# Patient Record
Sex: FEMALE | Race: WHITE | NOT HISPANIC OR LATINO | Employment: UNEMPLOYED | URBAN - METROPOLITAN AREA
[De-identification: names, ages, dates, MRNs, and addresses within clinical notes are randomized per-mention and may not be internally consistent; named-entity substitution may affect disease eponyms.]

---
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
## 2019-01-18 ENCOUNTER — HOSPITAL ENCOUNTER (EMERGENCY)
Facility: CLINIC | Age: 19
Discharge: HOME | End: 2019-01-18
Attending: EMERGENCY MEDICINE | Admitting: EMERGENCY MEDICINE
Payer: MEDICAID

## 2019-01-18 VITALS
BODY MASS INDEX: 49.05 KG/M2 | OXYGEN SATURATION: 96 % | RESPIRATION RATE: 16 BRPM | HEART RATE: 98 BPM | TEMPERATURE: 98.2 F | DIASTOLIC BLOOD PRESSURE: 72 MMHG | SYSTOLIC BLOOD PRESSURE: 121 MMHG | WEIGHT: 293 LBS

## 2019-01-18 DIAGNOSIS — R44.3 HALLUCINATIONS: ICD-10-CM

## 2019-01-18 PROCEDURE — 99285 EMERGENCY DEPT VISIT HI MDM: CPT | Mod: Z6 | Performed by: EMERGENCY MEDICINE

## 2019-01-18 PROCEDURE — 25000132 ZZH RX MED GY IP 250 OP 250 PS 637: Performed by: EMERGENCY MEDICINE

## 2019-01-18 PROCEDURE — 90791 PSYCH DIAGNOSTIC EVALUATION: CPT

## 2019-01-18 PROCEDURE — 99285 EMERGENCY DEPT VISIT HI MDM: CPT | Mod: 25 | Performed by: EMERGENCY MEDICINE

## 2019-01-18 RX ORDER — HYDROXYZINE HYDROCHLORIDE 50 MG/1
50 TABLET, FILM COATED ORAL 3 TIMES DAILY PRN
Status: DISCONTINUED | OUTPATIENT
Start: 2019-01-18 | End: 2019-01-18 | Stop reason: HOSPADM

## 2019-01-18 RX ORDER — CLONAZEPAM 0.5 MG/1
0.5 TABLET ORAL 3 TIMES DAILY PRN
COMMUNITY
End: 2022-04-30

## 2019-01-18 RX ORDER — LAMOTRIGINE 200 MG/1
200 TABLET ORAL 2 TIMES DAILY
COMMUNITY

## 2019-01-18 RX ORDER — LEVOTHYROXINE SODIUM 25 UG/1
25 TABLET ORAL AT BEDTIME
COMMUNITY
End: 2022-07-23

## 2019-01-18 RX ORDER — IBUPROFEN 800 MG/1
800 TABLET, FILM COATED ORAL EVERY 8 HOURS PRN
Status: ON HOLD | COMMUNITY
End: 2022-08-21

## 2019-01-18 RX ORDER — CLONIDINE HYDROCHLORIDE 0.1 MG/1
0.1 TABLET, EXTENDED RELEASE ORAL ONCE
Status: COMPLETED | OUTPATIENT
Start: 2019-01-18 | End: 2019-01-18

## 2019-01-18 RX ORDER — IBUPROFEN 800 MG/1
800 TABLET, FILM COATED ORAL EVERY 8 HOURS PRN
Status: DISCONTINUED | OUTPATIENT
Start: 2019-01-18 | End: 2019-01-18 | Stop reason: HOSPADM

## 2019-01-18 RX ORDER — GABAPENTIN 300 MG/1
300 CAPSULE ORAL DAILY
Status: DISCONTINUED | OUTPATIENT
Start: 2019-01-18 | End: 2019-01-18 | Stop reason: HOSPADM

## 2019-01-18 RX ORDER — LAMOTRIGINE 200 MG/1
200 TABLET ORAL 2 TIMES DAILY
Status: DISCONTINUED | OUTPATIENT
Start: 2019-01-18 | End: 2019-01-18 | Stop reason: HOSPADM

## 2019-01-18 RX ORDER — CLONAZEPAM 0.5 MG/1
0.5 TABLET ORAL 3 TIMES DAILY PRN
Status: DISCONTINUED | OUTPATIENT
Start: 2019-01-18 | End: 2019-01-18 | Stop reason: HOSPADM

## 2019-01-18 RX ORDER — IBUPROFEN 800 MG/1
800 TABLET, FILM COATED ORAL ONCE
Status: COMPLETED | OUTPATIENT
Start: 2019-01-18 | End: 2019-01-18

## 2019-01-18 RX ORDER — CLONIDINE HYDROCHLORIDE 0.1 MG/1
0.1 TABLET, EXTENDED RELEASE ORAL AT BEDTIME
COMMUNITY
End: 2022-04-30

## 2019-01-18 RX ORDER — LURASIDONE HYDROCHLORIDE 80 MG/1
80 TABLET, FILM COATED ORAL DAILY
Status: DISCONTINUED | OUTPATIENT
Start: 2019-01-18 | End: 2019-01-18 | Stop reason: HOSPADM

## 2019-01-18 RX ORDER — LURASIDONE HYDROCHLORIDE 80 MG/1
80 TABLET, FILM COATED ORAL AT BEDTIME
COMMUNITY
End: 2022-04-30

## 2019-01-18 RX ORDER — SENNOSIDES A AND B 8.6 MG/1
1 TABLET, FILM COATED ORAL 2 TIMES DAILY
COMMUNITY

## 2019-01-18 RX ORDER — LORAZEPAM 1 MG/1
1 TABLET ORAL ONCE
Status: COMPLETED | OUTPATIENT
Start: 2019-01-18 | End: 2019-01-18

## 2019-01-18 RX ORDER — LEVOTHYROXINE SODIUM 25 UG/1
25 TABLET ORAL ONCE
Status: COMPLETED | OUTPATIENT
Start: 2019-01-18 | End: 2019-01-18

## 2019-01-18 RX ORDER — DOCUSATE SODIUM 100 MG/1
100 CAPSULE, LIQUID FILLED ORAL 2 TIMES DAILY
COMMUNITY
End: 2022-07-23

## 2019-01-18 RX ORDER — CLONIDINE HYDROCHLORIDE 0.1 MG/1
0.1 TABLET ORAL 2 TIMES DAILY
COMMUNITY
End: 2022-04-30

## 2019-01-18 RX ORDER — HYDROXYZINE PAMOATE 50 MG/1
50 CAPSULE ORAL EVERY 8 HOURS PRN
COMMUNITY

## 2019-01-18 RX ORDER — GABAPENTIN 600 MG/1
1200 TABLET ORAL AT BEDTIME
COMMUNITY

## 2019-01-18 RX ADMIN — CLONAZEPAM 0.5 MG: 0.5 TABLET ORAL at 14:24

## 2019-01-18 RX ADMIN — IBUPROFEN 800 MG: 800 TABLET, FILM COATED ORAL at 14:50

## 2019-01-18 RX ADMIN — HYDROXYZINE HYDROCHLORIDE 50 MG: 50 TABLET ORAL at 09:23

## 2019-01-18 RX ADMIN — LAMOTRIGINE 200 MG: 200 TABLET ORAL at 10:48

## 2019-01-18 RX ADMIN — CLONAZEPAM 0.5 MG: 0.5 TABLET ORAL at 09:23

## 2019-01-18 RX ADMIN — GABAPENTIN 300 MG: 300 CAPSULE ORAL at 10:46

## 2019-01-18 RX ADMIN — CLONIDINE HYDROCHLORIDE 0.1 MG: 0.1 TABLET, EXTENDED RELEASE ORAL at 10:50

## 2019-01-18 RX ADMIN — LORAZEPAM 1 MG: 1 TABLET ORAL at 02:22

## 2019-01-18 RX ADMIN — LEVOTHYROXINE SODIUM 25 MCG: 25 TABLET ORAL at 10:49

## 2019-01-18 RX ADMIN — RANITIDINE 150 MG: 150 TABLET, FILM COATED ORAL at 10:46

## 2019-01-18 RX ADMIN — HYDROXYZINE HYDROCHLORIDE 50 MG: 50 TABLET ORAL at 14:24

## 2019-01-18 RX ADMIN — IBUPROFEN 800 MG: 800 TABLET, FILM COATED ORAL at 09:23

## 2019-01-18 ASSESSMENT — ENCOUNTER SYMPTOMS
HALLUCINATIONS: 1
NECK PAIN: 0
DYSURIA: 0
FEVER: 0
SHORTNESS OF BREATH: 0
CHEST TIGHTNESS: 0
ABDOMINAL PAIN: 0
HEADACHES: 0
SORE THROAT: 0
BACK PAIN: 0
CHILLS: 0

## 2019-01-18 NOTE — ED PROVIDER NOTES
"  History     Chief Complaint   Patient presents with     Mental Health Problem     Pt reports feeling \"numb and unstable, not all there\" for a couple weeks. Has baseline AH & VH, states have recently been more intense. Mom reports that she has been more edgy lately and has been violent with objects. Pt states she is afraid of losing her temper and hurting herself or others.     Suicidal     thoughts come & go, no specific plan shared during triage.     HPI  Yasmine Smith is a 18 year old female with a history of autism, bipolar, anxiety, who presents to the emergency department for evaluation of mental health problem.  Patient reports that she has been experiencing auditory and visual hallucinations along with paranoia.  She states that she gets these rages to hurt others and that these voices that she is hearing are telling her to hurt others.  She also has been experiencing nightmares.  Patient sees a therapist in the medical provider and reports that she is compliant with her medications.  She is not on any antipsychotics.  She has used Risperdal in the past however had significant weight gain (over 100 pounds in 1 year) and so was discontinued from this medication.  Patient states that she wants to be here to seek help.  Patient is with her mother.    I have reviewed the Medications, Allergies, Past Medical and Surgical History, and Social History in the [facility] system.  Past Medical History:   Diagnosis Date     Anxiety disorder      Bipolar disorder (H)        Past Surgical History:   Procedure Laterality Date     ENT SURGERY      tonsils, adenoids, turbulance       History reviewed. No pertinent family history.    Social History     Tobacco Use     Smoking status: Current Every Day Smoker     Packs/day: 0.50     Smokeless tobacco: Never Used     Tobacco comment: parents smoke outside   Substance Use Topics     Alcohol use: No       No current facility-administered medications for this encounter.  "     Current Outpatient Medications   Medication     CLONIDINE HCL ER PO     CLONIDINE HCL PO     docusate sodium (COLACE) 100 MG capsule     GABAPENTIN PO     lamoTRIgine (LAMICTAL) 200 MG tablet     LEVOTHYROXINE SODIUM PO     lurasidone (LATUDA) 80 MG TABS tablet     ranitidine (RANITIDINE 150 MAX STRENGTH) 150 MG tablet     senna (SENOKOT) 8.6 MG tablet     clonazePAM (KLONOPIN) 0.5 MG tablet     HydrOXYzine Pamoate (VISTARIL PO)     ibuprofen (ADVIL/MOTRIN) 800 MG tablet        Allergies   Allergen Reactions     Banana Swelling     Throat swells/ puffy     Food Swelling     Cantalope, causes swelling/puffiness of throat         Review of Systems   Constitutional: Negative for chills and fever.   HENT: Negative for sore throat.    Respiratory: Negative for chest tightness and shortness of breath.    Cardiovascular: Negative for chest pain.   Gastrointestinal: Negative for abdominal pain.   Genitourinary: Negative for dysuria.   Musculoskeletal: Negative for back pain and neck pain.   Neurological: Negative for headaches.   Psychiatric/Behavioral: Positive for hallucinations.   All other systems reviewed and are negative.      Physical Exam   BP: 123/82  Heart Rate: 92  Temp: 98.2  F (36.8  C)  Resp: 16  Weight: 142.1 kg (313 lb 3.2 oz)  SpO2: 97 %      Physical Exam  Physical Exam   Constitutional: oriented to person, place, and time. appears well-developed and well-nourished.   HENT:   Head: Normocephalic and atraumatic.   Neck: Normal range of motion.   Pulmonary/Chest: Effort normal. No respiratory distress.   Cardiac: No murmurs, rubs, gallops. RRR.  Abdominal: Abdomen soft, nontender, nondistended. No rebound tenderness.  MSK: Long bones without deformity or evidence of trauma  Neurological: alert and oriented to person, place, and time.   Skin: Skin is warm and dry.   Psychiatric:  normal mood and affect.  behavior is normal. Thought content normal.     ED Course   1:57 AM  The patient was seen and examined  by Thanh Herrera MD in Room 9.        Procedures    Labs Ordered and Resulted from Time of ED Arrival Up to the Time of Departure from the ED - No data to display         Assessments & Plan (with Medical Decision Making)   MDM  Patient presenting with decompensation of psychiatric disease.  She is having increasing hallucinations in addition to violent and suicidal thoughts.  Patient does not feel safe at home, neither does mother who is the patient's decision maker.  Patient is voluntary at this point for admission.  I do feel this patient would benefit from inpatient stabilization.  Family agrees with this plan.    I have reviewed the nursing notes.    I have reviewed the findings, diagnosis, plan and need for follow up with the patient.       Medication List      There are no discharge medications for this visit.         Final diagnoses:   Hallucinations     IThanh, am serving as a trained medical scribe to document services personally performed by Thanh Herrera MD, based on the provider's statements to me.   Thanh HONG MD, was physically present and have reviewed and verified the accuracy of this note documented by Thanh Bryan.    1/18/2019   [facility], [place], EMERGENCY DEPARTMENT     Thanh Herrera MD  01/18/19 0202

## 2019-01-18 NOTE — ED NOTES
Pt refusing to take her meds until her mother comes back from picking up breakfast.  Pt updated that mother said on phone she would be back in 10 minutes.  Pt easily upset.  Baling but not physically or verbally aggressive.

## 2019-01-18 NOTE — ED NOTES
2:38 PM  Patient was signed out to me as awaiting admission secondary to ongoing hallucinations in the setting of autism, anxiety and bipolar.  Patient has been here for 14 hours and mother has arrived.  In addition, patient is reassessed by behavioral emergency room .  Patient is much improved on current medications and mother wishes to bring her home.  She appears to be communicating without difficulty and does not appear to be holdable.  She will be discharged to follow-up with her current services in place.     Walter Way MD  01/18/19 7426

## 2019-01-18 NOTE — ED NOTES
"ED to Behavioral Floor Handoff    SITUATION  Yasmine Smith is a 18 year old female who speaks English and lives in a home with family members The patient arrived in the ED by private car from home with a complaint of Mental Health Problem (Pt reports feeling \"numb and unstable, not all there\" for a couple weeks. Has baseline AH & VH, states have recently been more intense. Mom reports that she has been more edgy lately and has been violent with objects. Pt states she is afraid of losing her temper and hurting herself or others.) and Suicidal (thoughts come & go, no specific plan shared during triage.)  .The patient's current symptoms started/worsened 1 week(s) ago and during this time the symptoms have remained the same.   In the ED, pt was diagnosed with   Final diagnoses:   Hallucinations        Initial vitals were: BP: 123/82  Heart Rate: 92  Temp: 98.2  F (36.8  C)  Resp: 16  Weight: 142.1 kg (313 lb 3.2 oz)  SpO2: 97 %   --------  Is the patient diabetic? No   If yes, last blood glucose? --     If yes, was this treated in the ED? --  --------  Is the patient inebriated (ETOH) No or Impaired on other substances? No  MSSA done? N/A  Last MSSA score: --    Were withdrawal symptoms treated? N/A  Does the patient have a seizure history? No. If yes, date of most recent seizure--  --------  Is the patient patient experiencing suicidal ideation? denies current or recent suicidal ideation     Homicidal ideation? Patient reporting that the voices in her head have told her to hurt people, no specific plan    Self-injurious behavior/urges? denies current or recent self injurious behavior or ideation.  ------  Was pt aggressive in the ED No  Was a code called No  Is the pt now cooperative? Yes  -------  Meds given in ED:   Medications   LORazepam (ATIVAN) tablet 1 mg (1 mg Oral Given 1/18/19 0222)      Family present during ED course? Yes  Family currently present? Yes    BACKGROUND  Does the patient have a cognitive " impairment or developmental disability? Yes  Allergies:   Allergies   Allergen Reactions     Banana Swelling     Throat swells/ puffy     Food Swelling     Cantalope, causes swelling/puffiness of throat     .   Social demographics are   Social History     Socioeconomic History     Marital status: Single     Spouse name: None     Number of children: None     Years of education: None     Highest education level: None   Social Needs     Financial resource strain: None     Food insecurity - worry: None     Food insecurity - inability: None     Transportation needs - medical: None     Transportation needs - non-medical: None   Occupational History     None   Tobacco Use     Smoking status: Current Every Day Smoker     Packs/day: 0.50     Smokeless tobacco: Never Used     Tobacco comment: parents smoke outside   Substance and Sexual Activity     Alcohol use: No     Drug use: No     Sexual activity: None   Other Topics Concern     None   Social History Narrative     None        ASSESSMENT  Labs results Labs Ordered and Resulted from Time of ED Arrival Up to the Time of Departure from the ED - No data to display   Imaging Studies: No results found for this or any previous visit (from the past 24 hour(s)).   Most recent vital signs /82   Temp 98.2  F (36.8  C) (Oral)   Resp 16   Wt 142.1 kg (313 lb 3.2 oz)   SpO2 97%   BMI 49.05 kg/m     Abnormal labs/tests/findings requiring intervention:---   Pain control: pt had none  Nausea control: pt had none    RECOMMENDATION  Are any infection precautions needed (MRSA, VRE, etc.)? No If yes, what infection? --  ---  Does the patient have mobility issues? independently. If yes, what device does the pt use? ---  ---  Is patient on 72 hour hold or commitment? No If on 72 hour hold, have hold and rights been given to patient? N/A  Are admitting orders written if after 10 p.m. ?N/A  Tasks needing to be completed:---     Wilma Hoffman   [phone] [facility]

## 2019-01-18 NOTE — PHARMACY-ADMISSION MEDICATION HISTORY
Admission medication history interview status for the 1/18/2019 admission is complete. See Epic admission navigator for allergy information, pharmacy and prior to admission medications.     Medication history interview sources:  Patient's mother (medication list on her phone), [facility]    Changes made to PTA medication list (reason)  Added: none  Deleted: none  Changed: clonidine (frequency per patient's mother), gabapentin (dose and frequency per patient's mother), hydroxyzine (frequcney per patient's mother), levothyroxine (frequency per patient's mother), Latuda (dosing and frequency per patient's mother), senna (frequency per patient's mother)    Additional medication history information (including reliability of information, actions taken by pharmacist): The patient's mother was a reliable historian of her medication history and able to discuss the patient's medications without difficulty.     Clonazepam 0.5 mg last filled 11/8/18 for quantity 60 for 30 day supply  Gabapentin 300 mg last filled 12/20/18 for quantity 90 for 30 day supply  Gabapentin 600 mg last filled 11/28/18 for quantity 30 for 30 day supply      Prior to Admission medications    Medication Sig Last Dose Taking? Auth Provider   clonazePAM (KLONOPIN) 0.5 MG tablet Take 0.5 mg by mouth 3 times daily as needed for anxiety 1/18/2019 at AM Yes Reported, Patient   cloNIDine (CATAPRES) 0.1 MG tablet Take 0.1 mg by mouth 2 times daily 1/18/2019 at AM Yes Unknown, Entered By History   CloNIDine ER (KAPVAY) 0.1 MG 12 hr tablet Take 0.1 mg by mouth At Bedtime 1/17/2019 at Unknown time Yes Unknown, Entered By History   docusate sodium (COLACE) 100 MG capsule Take 100 mg by mouth 2 times daily 1/18/2019 at AM Yes Reported, Patient   gabapentin (NEURONTIN) 300 MG capsule Take 900 mg by mouth At Bedtime 1/17/2019 at Unknown time Yes Unknown, Entered By History   GABAPENTIN PO Take 300 mg by mouth daily  1/18/2019 at AM Yes  Reported, Patient   hydrOXYzine (VISTARIL) 50 MG capsule Take 50 mg by mouth 3 times daily (with meals) Takes at 0800, 1200 and 1600 1/18/2019 at Unknown time Yes Unknown, Entered By History   ibuprofen (ADVIL/MOTRIN) 800 MG tablet Take 800 mg by mouth every 8 hours as needed for moderate pain 1/18/2019 at Unknown time Yes Reported, Patient   lamoTRIgine (LAMICTAL) 200 MG tablet Take 200 mg by mouth 2 times daily 1/18/2019 at AM Yes Reported, Patient   levothyroxine (SYNTHROID/LEVOTHROID) 25 MCG tablet Take 25 mcg by mouth every morning 1/18/2019 at Unknown time Yes Unknown, Entered By History   lurasidone (LATUDA) 80 MG TABS tablet Take 80 mg by mouth At Bedtime  1/17/2019 at Unknown time Yes Reported, Patient   ranitidine (RANITIDINE 150 MAX STRENGTH) 150 MG tablet Take 150 mg by mouth daily 1/18/2019 at AM Yes Reported, Patient   senna (SENOKOT) 8.6 MG tablet Take 1 tablet by mouth every evening  1/17/2019 at Unknown time Yes Reported, Patient       Medication history completed by:  Karime Ricks, PharmD, BCPP  Behavioral Health Pharmacist

## 2019-01-18 NOTE — ED AVS SNAPSHOT
[facility], [place], Emergency Department  [address]  [facility] [address]  Phone:  [phone]  Fax:  [phone]                                    Yasmine Smith   MRN: [id number]    Department:  [facility], Emergency Department   Date of Visit:  1/18/2019           After Visit Summary Signature Page    I have received my discharge instructions, and my questions have been answered. I have discussed any challenges I see with this plan with the nurse or doctor.    ..........................................................................................................................................  Patient/Patient Representative Signature      ..........................................................................................................................................  Patient Representative Print Name and Relationship to Patient    ..................................................               ................................................  Date                                   Time    ..........................................................................................................................................  Reviewed by Signature/Title    ...................................................              ..............................................  Date                                               Time          22EPIC Rev 08/18

## 2019-03-09 ENCOUNTER — NURSE TRIAGE (OUTPATIENT)
Dept: NURSING | Facility: CLINIC | Age: 19
End: 2019-03-09

## 2019-03-10 NOTE — TELEPHONE ENCOUNTER
"She is constipated.  She saw MD on Thursday and had an x ray that did show constipation.  She has tried an enema and stool softer. It has been a week since she had a full BM.      Reason for Disposition    Last bowel movement (BM) > 4 days ago    Additional Information    Negative: [1] Abdominal pain is main symptom AND [2] adult male    Negative: [1] Abdominal pain is main symptom AND [2] adult female    Negative: Rectal bleeding or blood in stool is main symptom    Negative: Rectal pain or itching is main symptom    Negative: Patient sounds very sick or weak to the triager    Negative: [1] Vomiting AND [2] abdomen looks much more swollen than usual    Negative: [1] Vomiting AND [2] contains bile (green color)    Negative: [1] Constant abdominal pain AND [2] present > 2 hours    Negative: [1] Sudden onset rectal pain (straining, rectal pressure or fullness) AND [2] NOT better after SITZ bath, suppository or enema    Negative: Abdomen is more swollen than usual    Negative: [1] Intermittent mild abdominal pain AND [2] fever    Answer Assessment - Initial Assessment Questions  1. STOOL PATTERN OR FREQUENCY: \"How often do you pass bowel movements (BMs)?\"  (Normal range: tid to q 3 days)  \"When was the last BM passed?\"        She tends to get constipation  2. STRAINING: \"Do you have to strain to have a BM?\"       yes  3. RECTAL PAIN: \"Does your rectum hurt when the stool comes out?\" If so, ask: \"Do you have hemorrhoids? How bad is the pain?\"  (Scale 1-10; or mild, moderate, severe)      yes  4. STOOL COMPOSITION: \"Are the stools hard?\"       yes  5. BLOOD ON STOOLS: \"Has there been any blood on the toilet tissue or on the surface of the BM?\" If so, ask: \"When was the last time?\"       unknown  6. CHRONIC CONSTIPATION: \"Is this a new problem for you?\"  If no, ask: How long have you had this problem?\" (days, weeks, months)       yes  7. CHANGES IN DIET: \"Have there been any recent changes in your diet?\"       no  8. " "MEDICATIONS: \"Have you been taking any new medications?\"      She is on a lot of medications that cause constipation   9. LAXATIVES: \"Have you been using any laxatives or enemas?\"  If yes, ask \"What, how often, and when was the last time?\"      She tried an enema without relief  10. CAUSE: \"What do you think is causing the constipation?\"         Her medications  11. OTHER SYMPTOMS: \"Do you have any other symptoms?\" (e.g., abdominal pain, fever, vomiting)        She had some cramping earlier today she is passing gas  12. PREGNANCY: \"Is there any chance you are pregnant?\" \"When was your last menstrual period?\"        n/a    Protocols used: CONSTIPATION-ADULT-      "

## 2019-03-12 ENCOUNTER — APPOINTMENT (OUTPATIENT)
Dept: CT IMAGING | Facility: CLINIC | Age: 19
End: 2019-03-12
Attending: EMERGENCY MEDICINE
Payer: MEDICAID

## 2019-03-12 ENCOUNTER — APPOINTMENT (OUTPATIENT)
Dept: GENERAL RADIOLOGY | Facility: CLINIC | Age: 19
End: 2019-03-12
Attending: EMERGENCY MEDICINE
Payer: MEDICAID

## 2019-03-12 ENCOUNTER — HOSPITAL ENCOUNTER (EMERGENCY)
Facility: CLINIC | Age: 19
Discharge: HOME | End: 2019-03-12
Attending: EMERGENCY MEDICINE | Admitting: EMERGENCY MEDICINE
Payer: MEDICAID

## 2019-03-12 VITALS
SYSTOLIC BLOOD PRESSURE: 133 MMHG | TEMPERATURE: 98.1 F | WEIGHT: 293 LBS | RESPIRATION RATE: 18 BRPM | HEART RATE: 84 BPM | OXYGEN SATURATION: 95 % | BODY MASS INDEX: 49.17 KG/M2 | DIASTOLIC BLOOD PRESSURE: 99 MMHG

## 2019-03-12 DIAGNOSIS — R10.84 ABDOMINAL PAIN, GENERALIZED: ICD-10-CM

## 2019-03-12 LAB
ALBUMIN UR-MCNC: NEGATIVE MG/DL
ANION GAP SERPL CALCULATED.3IONS-SCNC: 6 MMOL/L (ref 3–14)
APPEARANCE UR: CLEAR
B-HCG FREE SERPL-ACNC: <5 IU/L
BASOPHILS # BLD AUTO: 0.1 10E9/L (ref 0–0.2)
BASOPHILS NFR BLD AUTO: 0.5 %
BILIRUB UR QL STRIP: NEGATIVE
BUN SERPL-MCNC: 12 MG/DL (ref 7–30)
CALCIUM SERPL-MCNC: 8.9 MG/DL (ref 8.5–10.1)
CHLORIDE SERPL-SCNC: 107 MMOL/L (ref 96–110)
CO2 SERPL-SCNC: 25 MMOL/L (ref 20–32)
COLOR UR AUTO: ABNORMAL
CREAT SERPL-MCNC: 0.92 MG/DL (ref 0.5–1)
DIFFERENTIAL METHOD BLD: ABNORMAL
EOSINOPHIL # BLD AUTO: 0.3 10E9/L (ref 0–0.7)
EOSINOPHIL NFR BLD AUTO: 3 %
ERYTHROCYTE [DISTWIDTH] IN BLOOD BY AUTOMATED COUNT: 13.1 % (ref 10–15)
GFR SERPL CREATININE-BSD FRML MDRD: >90 ML/MIN/{1.73_M2}
GLUCOSE SERPL-MCNC: 87 MG/DL (ref 70–99)
GLUCOSE UR STRIP-MCNC: NEGATIVE MG/DL
HCT VFR BLD AUTO: 45.8 % (ref 35–47)
HGB BLD-MCNC: 14.7 G/DL (ref 11.7–15.7)
HGB UR QL STRIP: NEGATIVE
IMM GRANULOCYTES # BLD: 0 10E9/L (ref 0–0.4)
IMM GRANULOCYTES NFR BLD: 0.3 %
KETONES UR STRIP-MCNC: NEGATIVE MG/DL
LEUKOCYTE ESTERASE UR QL STRIP: NEGATIVE
LYMPHOCYTES # BLD AUTO: 3.2 10E9/L (ref 0.8–5.3)
LYMPHOCYTES NFR BLD AUTO: 30 %
MCH RBC QN AUTO: 27.7 PG (ref 26.5–33)
MCHC RBC AUTO-ENTMCNC: 32.1 G/DL (ref 31.5–36.5)
MCV RBC AUTO: 86 FL (ref 78–100)
MONOCYTES # BLD AUTO: 0.6 10E9/L (ref 0–1.3)
MONOCYTES NFR BLD AUTO: 5.8 %
NEUTROPHILS # BLD AUTO: 6.5 10E9/L (ref 1.6–8.3)
NEUTROPHILS NFR BLD AUTO: 60.4 %
NITRATE UR QL: NEGATIVE
NRBC # BLD AUTO: 0 10*3/UL
NRBC BLD AUTO-RTO: 0 /100
PH UR STRIP: 6 PH (ref 5–7)
PLATELET # BLD AUTO: 283 10E9/L (ref 150–450)
POTASSIUM SERPL-SCNC: 4.4 MMOL/L (ref 3.4–5.3)
RBC # BLD AUTO: 5.31 10E12/L (ref 3.8–5.2)
RBC #/AREA URNS AUTO: 3 /HPF (ref 0–2)
SODIUM SERPL-SCNC: 138 MMOL/L (ref 133–144)
SOURCE: ABNORMAL
SP GR UR STRIP: 1.01 (ref 1–1.03)
SQUAMOUS #/AREA URNS AUTO: 1 /HPF (ref 0–1)
UROBILINOGEN UR STRIP-MCNC: 0 MG/DL (ref 0–2)
WBC # BLD AUTO: 10.7 10E9/L (ref 4–11)
WBC #/AREA URNS AUTO: <1 /HPF (ref 0–5)

## 2019-03-12 PROCEDURE — 85025 COMPLETE CBC W/AUTO DIFF WBC: CPT | Performed by: EMERGENCY MEDICINE

## 2019-03-12 PROCEDURE — 80048 BASIC METABOLIC PNL TOTAL CA: CPT | Performed by: EMERGENCY MEDICINE

## 2019-03-12 PROCEDURE — 81001 URINALYSIS AUTO W/SCOPE: CPT | Performed by: EMERGENCY MEDICINE

## 2019-03-12 PROCEDURE — 25000128 H RX IP 250 OP 636: Performed by: EMERGENCY MEDICINE

## 2019-03-12 PROCEDURE — 99285 EMERGENCY DEPT VISIT HI MDM: CPT | Mod: 25

## 2019-03-12 PROCEDURE — 74177 CT ABD & PELVIS W/CONTRAST: CPT

## 2019-03-12 PROCEDURE — 84702 CHORIONIC GONADOTROPIN TEST: CPT

## 2019-03-12 PROCEDURE — 74019 RADEX ABDOMEN 2 VIEWS: CPT

## 2019-03-12 PROCEDURE — 25000132 ZZH RX MED GY IP 250 OP 250 PS 637: Performed by: EMERGENCY MEDICINE

## 2019-03-12 RX ORDER — IOPAMIDOL 755 MG/ML
500 INJECTION, SOLUTION INTRAVASCULAR ONCE
Status: COMPLETED | OUTPATIENT
Start: 2019-03-12 | End: 2019-03-12

## 2019-03-12 RX ADMIN — SODIUM CHLORIDE 65 ML: 9 INJECTION, SOLUTION INTRAVENOUS at 19:28

## 2019-03-12 RX ADMIN — IOPAMIDOL 100 ML: 755 INJECTION, SOLUTION INTRAVENOUS at 19:28

## 2019-03-12 RX ADMIN — MAGNESIUM CITRATE 286 ML: 1.75 LIQUID ORAL at 17:11

## 2019-03-12 ASSESSMENT — ENCOUNTER SYMPTOMS
DYSURIA: 1
APPETITE CHANGE: 1
NAUSEA: 0
VOMITING: 0
CONSTIPATION: 1
ABDOMINAL PAIN: 1

## 2019-03-12 NOTE — ED TRIAGE NOTES
"Pt here with c/o abd pain and constipation. Seen a few days ago and told \"2/3\" back up. Last BM yesterday, but not normal per mom. ABC intact.   "

## 2019-03-12 NOTE — ED PROVIDER NOTES
"  History     Chief Complaint:  Abdominal Pain      HPI   Yasmine Smith is a 18 year old female who presents with abdominal pain. The patient states that she typically has constipation and takes stool softeners b.i.d and laxatives once per day. She states that she has had increased constipation and abdominal pain for the last 2 weeks. The patient was seen at urgent care a few days ago and had an x-ray completed. At that time she was told she was \"2/3 backed up.\" The patient did increased her laxatives and has used suppositories at home. She notes that she did have a very small bowel movement yesterday morning but continues to have abdominal pressure. The patient has been eating less than normal, but denies any nausea or vomiting. She additionally reports discomfort after urinating. She denies any fevers.      Allergies:  No known drug allergies.      Medications:    Klonopin   Catapres   Kapvay  Colace   Neurontin   Vistaril   Lamictal   Levothyroxine   Latuda   Ranitidine   Senokot     Past Medical History:    Anxiety disorder   Bipolar Disorder     Past Surgical History:    Tonsillectomy and Adenoidectomy     Family History:    History reviewed. No pertinent family history.     Social History:  Marital Status: Single  Presents to the ED with mother  Tobacco Use: Current daily smoker, 0.50 PPD.   Alcohol Use: No  PCP: Liliana Nunez      Review of Systems   Constitutional: Positive for appetite change.   Gastrointestinal: Positive for abdominal pain and constipation. Negative for nausea and vomiting.   Genitourinary: Positive for dysuria.   All other systems reviewed and are negative.      Physical Exam   First Vitals:  BP: 131/81  Pulse: 90  Heart Rate: 106  Temp: 98.1  F (36.7  C)  Resp: 18  Weight: 142.4 kg (313 lb 15 oz)  SpO2: 97 %      Physical Exam    Nursing note and vitals reviewed.  Constitutional: Cooperative.   HENT:   Mouth/Throat: Moist mucous membranes.   Eyes: EOMI, nonicteric " sclera  Cardiovascular: Normal rate, regular rhythm, no murmurs, rubs, or gallops  Pulmonary/Chest: Effort normal and breath sounds normal. No respiratory distress. No wheezes. No rales.   Abdominal: Soft. Vague, diffuse TTP without guarding/rigidity. nondistended. BS present.   Musculoskeletal: Normal range of motion.   Neurological: Alert. Moves all extremities spontaneously.   Skin: Skin is warm and dry. No rash noted.   Psychiatric: Normal mood and affect.      Emergency Department Course   Imaging:  Abdomen x-ray, 2 views:   No evidence for any bowel obstruction.   Report per radiology.   Imaging independently reviewed and agree with radiologist interpretation.      Abd/Pelvis CT, IV contrast:   No acute abnormality is seen.   Preliminary radiology read.       Radiographic findings were communicated with the patient who voiced understanding of the findings.    Laboratory:  CBC:  WBC 10.7, HGB 14.7,   BMP:  WNL (Creatinine 0.92)   HCG: <5.0    UA: Clear straw urine, RBC 3 (H), otherwise WNL     Interventions:   (1711) Pink lady enema, 286 mLs, CA    Emergency Department Course:  Nursing notes and vitals reviewed.  (1636) I performed an exam of the patient as documented above.     Blood was drawn from the patient. This was sent for laboratory testing, findings above.    The patient was sent for an abdominal x-ray while in the emergency department, findings above.    Urine sample was obtained and sent for laboratory analysis, findings above.   The patient was sent for a CT Abdomen while in the emergency department, findings above.    Findings and plan explained to the patient. Patient discharged home with instructions regarding supportive care, medications, and reasons to return. The importance of close follow-up was reviewed.    I personally reviewed the laboratory results with the patient and answered all related questions prior to discharge.      Impression & Plan    Medical Decision Making:  Pt presents  with CC abdominal pain. Pt and mother report constipation as likely cause for symptoms, therefore attempted enema first for resolution. Given no resolution, differential broadened to include appendicitis, cholecystitis, ovarian pathology, bowel obstruction, UTI, among other etiologies. CT obtained which is fortunately normal. Labs unremarkable. At this time, uncertain etiology of pain, but given negative workup, ongoing outpt management warranted. She is in stable condition at the time of discharge, indications for return to the ED were discussed as well as follow up. All questions were answered and she and her mother are in agreement with the plan.        Diagnosis:    ICD-10-CM    1. Abdominal pain, generalized R10.84        Disposition:  discharged to home      Marisa HONG, am serving as a scribe on 3/12/2019 at 4:36 PM to personally document services performed by Dr. Madden based on my observations and the provider's statements to me.    3/12/2019   [facility] EMERGENCY DEPARTMENT       Ismael Madden MD  03/13/19 8257

## 2019-03-12 NOTE — ED AVS SNAPSHOT
[facility] Emergency Department  [address]  [facility] [address]  Phone:  [phone]  Fax:  [phone]                                    Yasmine Smith   MRN: [id number]    Department:  [facility] Emergency Department   Date of Visit:  3/12/2019           After Visit Summary Signature Page    I have received my discharge instructions, and my questions have been answered. I have discussed any challenges I see with this plan with the nurse or doctor.    ..........................................................................................................................................  Patient/Patient Representative Signature      ..........................................................................................................................................  Patient Representative Print Name and Relationship to Patient    ..................................................               ................................................  Date                                   Time    ..........................................................................................................................................  Reviewed by Signature/Title    ...................................................              ..............................................  Date                                               Time          22EPIC Rev 08/18

## 2019-03-13 NOTE — ED NOTES
Pt is in room noted to be crying, and screaming out for her mom.  Mother not in room.  Mother called on cellphone and asked to return to room.  Attempted to comfort patient.  Patient will not talk with me.  MD notified.

## 2020-01-03 ENCOUNTER — APPOINTMENT (OUTPATIENT)
Dept: CT IMAGING | Facility: CLINIC | Age: 20
End: 2020-01-03
Attending: PHYSICIAN ASSISTANT
Payer: MEDICAID

## 2020-01-03 ENCOUNTER — HOSPITAL ENCOUNTER (EMERGENCY)
Facility: CLINIC | Age: 20
Discharge: HOME | End: 2020-01-03
Attending: PHYSICIAN ASSISTANT | Admitting: PHYSICIAN ASSISTANT
Payer: MEDICAID

## 2020-01-03 VITALS
BODY MASS INDEX: 45.99 KG/M2 | OXYGEN SATURATION: 97 % | HEART RATE: 89 BPM | TEMPERATURE: 97.8 F | SYSTOLIC BLOOD PRESSURE: 118 MMHG | DIASTOLIC BLOOD PRESSURE: 57 MMHG | WEIGHT: 293 LBS | RESPIRATION RATE: 18 BRPM | HEIGHT: 67 IN

## 2020-01-03 DIAGNOSIS — R10.9 ABDOMINAL PAIN: ICD-10-CM

## 2020-01-03 DIAGNOSIS — R11.2 NAUSEA WITH VOMITING: ICD-10-CM

## 2020-01-03 LAB
ALBUMIN SERPL-MCNC: 3.6 G/DL (ref 3.4–5)
ALBUMIN UR-MCNC: 30 MG/DL
ALP SERPL-CCNC: 62 U/L (ref 40–150)
ALT SERPL W P-5'-P-CCNC: 23 U/L (ref 0–50)
ANION GAP SERPL CALCULATED.3IONS-SCNC: 8 MMOL/L (ref 3–14)
APPEARANCE UR: ABNORMAL
AST SERPL W P-5'-P-CCNC: 12 U/L (ref 0–45)
B-HCG FREE SERPL-ACNC: <5 IU/L
BACTERIA #/AREA URNS HPF: ABNORMAL /HPF
BASOPHILS # BLD AUTO: 0 10E9/L (ref 0–0.2)
BASOPHILS NFR BLD AUTO: 0.1 %
BILIRUB SERPL-MCNC: 0.5 MG/DL (ref 0.2–1.3)
BILIRUB UR QL STRIP: NEGATIVE
BUN SERPL-MCNC: 12 MG/DL (ref 7–30)
CALCIUM SERPL-MCNC: 8.6 MG/DL (ref 8.5–10.1)
CHLORIDE SERPL-SCNC: 112 MMOL/L (ref 94–109)
CO2 SERPL-SCNC: 20 MMOL/L (ref 20–32)
COLOR UR AUTO: YELLOW
CREAT SERPL-MCNC: 0.88 MG/DL (ref 0.52–1.04)
DIFFERENTIAL METHOD BLD: ABNORMAL
EOSINOPHIL # BLD AUTO: 0.2 10E9/L (ref 0–0.7)
EOSINOPHIL NFR BLD AUTO: 1.8 %
ERYTHROCYTE [DISTWIDTH] IN BLOOD BY AUTOMATED COUNT: 12.6 % (ref 10–15)
GFR SERPL CREATININE-BSD FRML MDRD: >90 ML/MIN/{1.73_M2}
GLUCOSE SERPL-MCNC: 104 MG/DL (ref 70–99)
GLUCOSE UR STRIP-MCNC: NEGATIVE MG/DL
HCT VFR BLD AUTO: 48.6 % (ref 35–47)
HGB BLD-MCNC: 15.3 G/DL (ref 11.7–15.7)
HGB UR QL STRIP: ABNORMAL
IMM GRANULOCYTES # BLD: 0 10E9/L (ref 0–0.4)
IMM GRANULOCYTES NFR BLD: 0.2 %
KETONES UR STRIP-MCNC: NEGATIVE MG/DL
LEUKOCYTE ESTERASE UR QL STRIP: NEGATIVE
LIPASE SERPL-CCNC: 152 U/L (ref 73–393)
LYMPHOCYTES # BLD AUTO: 0.9 10E9/L (ref 0.8–5.3)
LYMPHOCYTES NFR BLD AUTO: 10.6 %
MCH RBC QN AUTO: 27.7 PG (ref 26.5–33)
MCHC RBC AUTO-ENTMCNC: 31.5 G/DL (ref 31.5–36.5)
MCV RBC AUTO: 88 FL (ref 78–100)
MONOCYTES # BLD AUTO: 0.4 10E9/L (ref 0–1.3)
MONOCYTES NFR BLD AUTO: 4.6 %
MUCOUS THREADS #/AREA URNS LPF: PRESENT /LPF
NEUTROPHILS # BLD AUTO: 7.2 10E9/L (ref 1.6–8.3)
NEUTROPHILS NFR BLD AUTO: 82.7 %
NITRATE UR QL: NEGATIVE
NRBC # BLD AUTO: 0 10*3/UL
NRBC BLD AUTO-RTO: 0 /100
PH UR STRIP: 7.5 PH (ref 5–7)
PLATELET # BLD AUTO: 215 10E9/L (ref 150–450)
POTASSIUM SERPL-SCNC: 3.8 MMOL/L (ref 3.4–5.3)
PROT SERPL-MCNC: 6.8 G/DL (ref 6.8–8.8)
RBC # BLD AUTO: 5.52 10E12/L (ref 3.8–5.2)
RBC #/AREA URNS AUTO: 22 /HPF (ref 0–2)
SODIUM SERPL-SCNC: 140 MMOL/L (ref 133–144)
SOURCE: ABNORMAL
SP GR UR STRIP: 1.03 (ref 1–1.03)
SQUAMOUS #/AREA URNS AUTO: 15 /HPF (ref 0–1)
UROBILINOGEN UR STRIP-MCNC: 2 MG/DL (ref 0–2)
WBC # BLD AUTO: 8.8 10E9/L (ref 4–11)
WBC #/AREA URNS AUTO: 3 /HPF (ref 0–5)

## 2020-01-03 PROCEDURE — 25800030 ZZH RX IP 258 OP 636: Performed by: PHYSICIAN ASSISTANT

## 2020-01-03 PROCEDURE — 25000128 H RX IP 250 OP 636: Performed by: PHYSICIAN ASSISTANT

## 2020-01-03 PROCEDURE — 81001 URINALYSIS AUTO W/SCOPE: CPT | Performed by: PHYSICIAN ASSISTANT

## 2020-01-03 PROCEDURE — 99285 EMERGENCY DEPT VISIT HI MDM: CPT | Mod: 25

## 2020-01-03 PROCEDURE — 96361 HYDRATE IV INFUSION ADD-ON: CPT

## 2020-01-03 PROCEDURE — 96375 TX/PRO/DX INJ NEW DRUG ADDON: CPT

## 2020-01-03 PROCEDURE — 83690 ASSAY OF LIPASE: CPT | Performed by: PHYSICIAN ASSISTANT

## 2020-01-03 PROCEDURE — 25000125 ZZHC RX 250: Performed by: PHYSICIAN ASSISTANT

## 2020-01-03 PROCEDURE — 80053 COMPREHEN METABOLIC PANEL: CPT | Performed by: PHYSICIAN ASSISTANT

## 2020-01-03 PROCEDURE — 96374 THER/PROPH/DIAG INJ IV PUSH: CPT | Mod: 59

## 2020-01-03 PROCEDURE — 74177 CT ABD & PELVIS W/CONTRAST: CPT

## 2020-01-03 PROCEDURE — 85025 COMPLETE CBC W/AUTO DIFF WBC: CPT | Performed by: PHYSICIAN ASSISTANT

## 2020-01-03 PROCEDURE — 84702 CHORIONIC GONADOTROPIN TEST: CPT

## 2020-01-03 RX ORDER — KETOROLAC TROMETHAMINE 15 MG/ML
15 INJECTION, SOLUTION INTRAMUSCULAR; INTRAVENOUS ONCE
Status: COMPLETED | OUTPATIENT
Start: 2020-01-03 | End: 2020-01-03

## 2020-01-03 RX ORDER — IOPAMIDOL 755 MG/ML
500 INJECTION, SOLUTION INTRAVASCULAR ONCE
Status: COMPLETED | OUTPATIENT
Start: 2020-01-03 | End: 2020-01-03

## 2020-01-03 RX ORDER — ONDANSETRON 2 MG/ML
4 INJECTION INTRAMUSCULAR; INTRAVENOUS ONCE
Status: COMPLETED | OUTPATIENT
Start: 2020-01-03 | End: 2020-01-03

## 2020-01-03 RX ADMIN — ONDANSETRON HYDROCHLORIDE 4 MG: 2 INJECTION, SOLUTION INTRAMUSCULAR; INTRAVENOUS at 11:08

## 2020-01-03 RX ADMIN — KETOROLAC TROMETHAMINE 15 MG: 15 INJECTION, SOLUTION INTRAMUSCULAR; INTRAVENOUS at 12:01

## 2020-01-03 RX ADMIN — SODIUM CHLORIDE 1000 ML: 9 INJECTION, SOLUTION INTRAVENOUS at 11:07

## 2020-01-03 RX ADMIN — SODIUM CHLORIDE 65 ML: 9 INJECTION, SOLUTION INTRAVENOUS at 12:13

## 2020-01-03 RX ADMIN — IOPAMIDOL 100 ML: 755 INJECTION, SOLUTION INTRAVENOUS at 12:13

## 2020-01-03 ASSESSMENT — ENCOUNTER SYMPTOMS
VOMITING: 1
NAUSEA: 1
BACK PAIN: 1
DIZZINESS: 1
DYSURIA: 1
FEVER: 0
LIGHT-HEADEDNESS: 1
ABDOMINAL PAIN: 1
DIARRHEA: 1

## 2020-01-03 ASSESSMENT — MIFFLIN-ST. JEOR: SCORE: 2219.63

## 2020-01-03 NOTE — ED PROVIDER NOTES
History     Chief Complaint:    Abdominal Pain    The history is provided by the patient.      Yasmine Smith is a 19 year old female with a history of acute pyelonephritis and GERD who presents with right-sided abdominal pain. Her symptoms began a 4 or 5 days ago and she also reports some back pain, dysuria, dizziness, lightheadedness, decreased urine, nausea, diarrhea, and vomiting that began this morning. She has not been able to eat or drink anything today; her last meal was at 1900 last night. The patient denies any fevers. She states this feels different from past kidney infections and denies left-sided abdominal pain. The patient denies any history of abdominal surgeries. Of note: her last kidney infection was 8 or 9 months ago.     Allergies:  Banana  Food     Medications:    Klonopin  Catapres  Kapvay  Colace  Gabapentin  Senna-S  Excedrin  Vistaril  Lamictal  Synthroid  Latuda  Ranitidine  Senokot  Minipress  Benadryl  Albuterol HFA  Zantac  Rexulti  Zoloft    Past Medical History:    Anxiety  Bipolar disorder  Hypokalemia  Closed fracture of metacarpal bone  Pyelonephritis, acute  Mild intermittent asthma with status asthmaticus  Migraine without aura and without status migrainosus, not intractable  GERD  Tobacco use  Morbid obesity  Schizophrenia  Panic disorder  Borderline personality disorder  Fragile X syndrome  Insomnia  Moderate intellectual disabilities  Oppositional defiant disorder, severe  Reactive attachment disorder, persistent  Bipolar affective disorder  Obstructive sleep apnea  Psychosis  Dysthymia  Active autistic disorder  Intermittent explosive disorder  Aggressive behavior  Autoimmune hypothyroidism  Depression  ADHD  Obesity  Suicidal ideation  Thyroid dysfunction    Past Surgical History:    ENT surgery - tonsils, adenoids, turbulance    Family History:    ADHD - father  Lupus - mother  Anxiety - mother  Narcolepsy - mother    Social History:  Positive for tobacco use - 0.50  "packs/day.  Negative for alcohol use.  Negative for drug use.  Patient accompanied to the ED by her mother.  Marital Status:  Single [1]     Review of Systems   Constitutional: Negative for fever.   Gastrointestinal: Positive for abdominal pain (right-sided only), diarrhea, nausea and vomiting.   Genitourinary: Positive for decreased urine volume and dysuria.   Musculoskeletal: Positive for back pain.   Neurological: Positive for dizziness and light-headedness.   All other systems reviewed and are negative.       Physical Exam     Patient Vitals for the past 24 hrs:   BP Temp Temp src Pulse Resp SpO2 Height Weight   01/03/20 1300 118/57 -- -- 89 -- 97 % -- --   01/03/20 1245 126/66 -- -- 89 -- 97 % -- --   01/03/20 1023 102/84 97.8  F (36.6  C) Temporal 138 18 99 % 1.702 m (5' 7\") 141.7 kg (312 lb 6.3 oz)     Physical Exam  Constitutional: well appearing, no acute distress.   Head: No external signs of trauma noted to head or face.   Eyes: Pupils are equal, round, and reactive to light. Conjunctiva normal. EOMI. No scleral icterus.   ENT: Lips dry, but MMM. Normal voice.   Neck: normal ROM.   Cardiovascular: tachycardic, regular rhythm, and intact distal pulses.    Respiratory: Effort normal. No respiratory distress. Lungs clear to auscultation bilaterally.   GI: Soft. RUQ and RLQ tenderness to palpation without rebound or guarding. Mild right CVA tenderness.   Musculoskeletal: No deformities appreciated. Normal ROM. No edema noted.  Neurological: Alert and Oriented x 3. Speech normal. Moves all extremities equally   Psychiatric: Appropriate mood, affect, and behavior.   Skin: Skin is warm and dry. no jaundice.    Emergency Department Course     Imaging:  Radiology findings were communicated with the patient and family who voiced understanding of the findings.    CT Abd/Pelvis w/ IV contrast TRAUMA/AAA:   No acute pathology in the abdomen or pelvis, as per radiology.    Laboratory:  Laboratory findings were " communicated with the patient and family who voiced understanding of the findings.    UA: slightly cloudy, yellow urine, blood small, pH 7.5 H, protein albumin 30, RBC 22 H, bacteria few, squamous epithelial 15 H, mucous present o/w negative    ISTAT HCG Quantitative Pregnancy POCT: <5.0  CMP: Chloride 112 H, Glucose 104 H o/w WNL (Creatinine 0.88)  CBC: AWNL (WBC 8.8, HGB 15.3, )  Lipase: 152    Interventions:  1107: 0.9% sodium chloride BOLUS 1 L IV  1108: Zofran 4 mg IV  1201: Toradol 15 mg IV    Emergency Department Course:  Past medical records, nursing notes, and vitals reviewed.    1035: I performed an exam of the patient as documented above.     IV was inserted and blood was drawn for laboratory testing, results above.    The patient provided a urine sample here in the emergency department. This was sent for laboratory testing, findings above.    The patient was sent for an abdomen pelvis CT while in the emergency department, results above.     1305: I rechecked the patient and discussed the results of her workup thus far.     I personally reviewed the laboratory and imaging results with the Patient and mother and answered all related questions prior to discharge.     Findings and plan explained to the Patient and mother. Patient discharged home with instructions regarding supportive care, medications, and reasons to return. The importance of close follow-up was reviewed.     Impression & Plan     Medical Decision Making:  Yasmine Smith presents with right sided abdominal pain.  The differential diagnosis is broad and includes:  Appendicitis, cholecystitis, peptic ulcer disease, diverticulitis, bowel obstruction, ischemia, pancreatitis, UTI/pyelonephritis, renal colic, enteritis/colitis, amongst others. The clinical exam today is non-specific. The laboratory testing has returned normal. She is not pregnant and urinalysis is not suggestive of infection. Imaging is noted to be normal.  The pain has  improved with interventions in the ED. On reassessment, her abodminal exam has improved and she has a benign abdominal exam and is tolerating PO. The exact etiology of the pain is not clear at this time, but no life threatening cause has been identified today. She will be discharged, and was warned that persistent or worsening symptoms should prompt re-examination (ED if necessary) in 8-12 hours.      Diagnosis:    ICD-10-CM   1. Abdominal pain R10.9   2. Nausea with vomiting R11.2     Disposition:  Discharged to home.      Scribe Disclosure:  Jeny HONG, am serving as a scribe at 10:37 AM on 1/3/2020 to document services personally performed by Myriam Monahan PA-C based on my observations and the provider's statements to me.     Jeny Durand  1/3/2020   [facility] EMERGENCY DEPARTMENT       Myriam Monahan PA-C  01/03/20 2273

## 2020-01-03 NOTE — ED AVS SNAPSHOT
[facility] Emergency Department  [address]  [facility] [address]  Phone:  [phone]  Fax:  [phone]                                    Yasmine Smith   MRN: [id number]    Department:  [facility] Emergency Department   Date of Visit:  1/3/2020           After Visit Summary Signature Page    I have received my discharge instructions, and my questions have been answered. I have discussed any challenges I see with this plan with the nurse or doctor.    ..........................................................................................................................................  Patient/Patient Representative Signature      ..........................................................................................................................................  Patient Representative Print Name and Relationship to Patient    ..................................................               ................................................  Date                                   Time    ..........................................................................................................................................  Reviewed by Signature/Title    ...................................................              ..............................................  Date                                               Time          22EPIC Rev 08/18

## 2022-04-01 ENCOUNTER — APPOINTMENT (OUTPATIENT)
Dept: CT IMAGING | Facility: CLINIC | Age: 22
End: 2022-04-01
Attending: NURSE PRACTITIONER
Payer: MEDICAID

## 2022-04-01 ENCOUNTER — HOSPITAL ENCOUNTER (EMERGENCY)
Facility: CLINIC | Age: 22
Discharge: HOME | End: 2022-04-02
Attending: NURSE PRACTITIONER | Admitting: NURSE PRACTITIONER
Payer: MEDICAID

## 2022-04-01 DIAGNOSIS — R20.0 NUMBNESS: ICD-10-CM

## 2022-04-01 DIAGNOSIS — R51.9 PRESSURE IN HEAD: ICD-10-CM

## 2022-04-01 LAB
BASOPHILS # BLD AUTO: 0.1 10E3/UL (ref 0–0.2)
BASOPHILS NFR BLD AUTO: 1 %
EOSINOPHIL # BLD AUTO: 0.2 10E3/UL (ref 0–0.7)
EOSINOPHIL NFR BLD AUTO: 2 %
ERYTHROCYTE [DISTWIDTH] IN BLOOD BY AUTOMATED COUNT: 13 % (ref 10–15)
ETHANOL SERPL-MCNC: <0.01 G/DL
HCT VFR BLD AUTO: 45.1 % (ref 35–47)
HGB BLD-MCNC: 15.2 G/DL (ref 11.7–15.7)
IMM GRANULOCYTES # BLD: 0 10E3/UL
IMM GRANULOCYTES NFR BLD: 0 %
LYMPHOCYTES # BLD AUTO: 4.1 10E3/UL (ref 0.8–5.3)
LYMPHOCYTES NFR BLD AUTO: 45 %
MCH RBC QN AUTO: 29.8 PG (ref 26.5–33)
MCHC RBC AUTO-ENTMCNC: 33.7 G/DL (ref 31.5–36.5)
MCV RBC AUTO: 88 FL (ref 78–100)
MONOCYTES # BLD AUTO: 0.8 10E3/UL (ref 0–1.3)
MONOCYTES NFR BLD AUTO: 8 %
NEUTROPHILS # BLD AUTO: 4.1 10E3/UL (ref 1.6–8.3)
NEUTROPHILS NFR BLD AUTO: 44 %
NRBC # BLD AUTO: 0 10E3/UL
NRBC BLD AUTO-RTO: 0 /100
PLATELET # BLD AUTO: 204 10E3/UL (ref 150–450)
RBC # BLD AUTO: 5.1 10E6/UL (ref 3.8–5.2)
WBC # BLD AUTO: 9.3 10E3/UL (ref 4–11)

## 2022-04-01 PROCEDURE — 99285 EMERGENCY DEPT VISIT HI MDM: CPT | Performed by: NURSE PRACTITIONER

## 2022-04-01 PROCEDURE — 82077 ASSAY SPEC XCP UR&BREATH IA: CPT | Performed by: NURSE PRACTITIONER

## 2022-04-01 PROCEDURE — 258N000003 HC RX IP 258 OP 636: Performed by: NURSE PRACTITIONER

## 2022-04-01 PROCEDURE — 96360 HYDRATION IV INFUSION INIT: CPT | Performed by: NURSE PRACTITIONER

## 2022-04-01 PROCEDURE — 80053 COMPREHEN METABOLIC PANEL: CPT | Performed by: NURSE PRACTITIONER

## 2022-04-01 PROCEDURE — 70450 CT HEAD/BRAIN W/O DYE: CPT

## 2022-04-01 PROCEDURE — 99284 EMERGENCY DEPT VISIT MOD MDM: CPT | Mod: 25 | Performed by: NURSE PRACTITIONER

## 2022-04-01 PROCEDURE — 36415 COLL VENOUS BLD VENIPUNCTURE: CPT | Performed by: NURSE PRACTITIONER

## 2022-04-01 PROCEDURE — 85025 COMPLETE CBC W/AUTO DIFF WBC: CPT | Performed by: NURSE PRACTITIONER

## 2022-04-01 RX ORDER — SODIUM CHLORIDE 9 MG/ML
INJECTION, SOLUTION INTRAVENOUS CONTINUOUS
Status: DISCONTINUED | OUTPATIENT
Start: 2022-04-02 | End: 2022-04-02 | Stop reason: HOSPADM

## 2022-04-01 RX ADMIN — SODIUM CHLORIDE 1000 ML: 9 INJECTION, SOLUTION INTRAVENOUS at 23:35

## 2022-04-02 VITALS
OXYGEN SATURATION: 99 % | BODY MASS INDEX: 54.35 KG/M2 | HEART RATE: 72 BPM | RESPIRATION RATE: 14 BRPM | DIASTOLIC BLOOD PRESSURE: 99 MMHG | SYSTOLIC BLOOD PRESSURE: 133 MMHG | WEIGHT: 293 LBS | TEMPERATURE: 99 F

## 2022-04-02 LAB
ALBUMIN SERPL-MCNC: 3.6 G/DL (ref 3.4–5)
ALBUMIN UR-MCNC: NEGATIVE MG/DL
ALP SERPL-CCNC: 43 U/L (ref 40–150)
ALT SERPL W P-5'-P-CCNC: 42 U/L (ref 0–50)
AMPHETAMINES UR QL: NOT DETECTED
ANION GAP SERPL CALCULATED.3IONS-SCNC: 6 MMOL/L (ref 3–14)
APPEARANCE UR: CLEAR
AST SERPL W P-5'-P-CCNC: 25 U/L (ref 0–45)
BACTERIA #/AREA URNS HPF: ABNORMAL /HPF
BARBITURATES UR QL SCN: NOT DETECTED
BENZODIAZ UR QL SCN: NOT DETECTED
BILIRUB SERPL-MCNC: 0.3 MG/DL (ref 0.2–1.3)
BILIRUB UR QL STRIP: NEGATIVE
BUN SERPL-MCNC: 12 MG/DL (ref 7–30)
BUPRENORPHINE UR QL: NOT DETECTED
CALCIUM SERPL-MCNC: 9 MG/DL (ref 8.5–10.1)
CANNABINOIDS UR QL: DETECTED
CHLORIDE BLD-SCNC: 106 MMOL/L (ref 94–109)
CO2 SERPL-SCNC: 27 MMOL/L (ref 20–32)
COCAINE UR QL SCN: NOT DETECTED
COLOR UR AUTO: YELLOW
CREAT SERPL-MCNC: 0.87 MG/DL (ref 0.52–1.04)
D-METHAMPHET UR QL: NOT DETECTED
GFR SERPL CREATININE-BSD FRML MDRD: >90 ML/MIN/1.73M2
GLUCOSE BLD-MCNC: 96 MG/DL (ref 70–99)
GLUCOSE UR STRIP-MCNC: NEGATIVE MG/DL
HCG UR QL: NEGATIVE
HGB UR QL STRIP: ABNORMAL
KETONES UR STRIP-MCNC: 5 MG/DL
LEUKOCYTE ESTERASE UR QL STRIP: NEGATIVE
METHADONE UR QL SCN: NOT DETECTED
MUCOUS THREADS #/AREA URNS LPF: PRESENT /LPF
NITRATE UR QL: NEGATIVE
OPIATES UR QL SCN: NOT DETECTED
OXYCODONE UR QL SCN: NOT DETECTED
PCP UR QL SCN: NOT DETECTED
PH UR STRIP: 6 [PH] (ref 5–7)
POTASSIUM BLD-SCNC: 4.2 MMOL/L (ref 3.4–5.3)
PROPOXYPH UR QL: NOT DETECTED
PROT SERPL-MCNC: 6.7 G/DL (ref 6.8–8.8)
RBC URINE: 11 /HPF
SODIUM SERPL-SCNC: 139 MMOL/L (ref 133–144)
SP GR UR STRIP: 1.02 (ref 1–1.03)
SQUAMOUS EPITHELIAL: 1 /HPF
TRICYCLICS UR QL SCN: DETECTED
UROBILINOGEN UR STRIP-MCNC: 2 MG/DL
WBC URINE: 1 /HPF

## 2022-04-02 PROCEDURE — 81025 URINE PREGNANCY TEST: CPT | Performed by: NURSE PRACTITIONER

## 2022-04-02 PROCEDURE — 80306 DRUG TEST PRSMV INSTRMNT: CPT | Performed by: NURSE PRACTITIONER

## 2022-04-02 PROCEDURE — 81001 URINALYSIS AUTO W/SCOPE: CPT | Mod: 59 | Performed by: NURSE PRACTITIONER

## 2022-04-02 NOTE — DISCHARGE INSTRUCTIONS
Your symptoms could be related to side effects of your medications. Possibly from the amitriptyline and hopefully this will get better after you body gets used to the medication.  No worrisome findings on your labs or head CT.  You should drink plenty of water (8 glasses a day).  Contact your doctor on Monday for recheck.  Return for any worsening symptoms.

## 2022-04-02 NOTE — ED PROVIDER NOTES
"  History     Chief Complaint   Patient presents with     Altered Mental Status     HPI  Yasmine Smith is a 21 year old female with history of schizophrenia, autism, autoimmune hypothyroidism, anxiety, PTSD, and GERD who is accompanied by her father for evaluation of feeling \"numb all over\".  She states her hands feel very cold.  She complains of \"glossed over\" and blurry vision.  Head pressure.  Feeling shocking sensation in her head.   All of her symptoms started about 2 hours ago.  Denies fever or chills.  Slightly nauseated and states she is having \"heartburn\". No vomiting or diarrhea.  No dysuria, but endorses urinary frequency.  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  She is here visiting her father who she stays with every other weekend.  Father states that she has not had these type of symptoms before which concerned him. She took Excedrin migraine tonight with her other medications. She otherwise is not on any new medications. She has not missed any of her medications. She is doing a Keto diet.  Patient uses marijuana and delta8 gummies. She denies using any marijuana today.   Current every day smoker.  PCP: Dr. Awad, [facility] in [place], [place]    Allergies:  Allergies   Allergen Reactions     Aripiprazole Other (See Comments)     Restlessness     Banana Swelling     Throat swells/ puffy     Food Swelling     Cantalope, causes swelling/puffiness of throat       Mirtazapine Other (See Comments)     Weight gain     Olanzapine Other (See Comments)     Weight gain  And irritability     Risperidone Other (See Comments)     Breast tenderness       Problem List:    Patient Active Problem List    Diagnosis Date Noted     Pyelonephritis, acute 08/10/2013     Priority: High     Headache 04/06/2018     Priority: Medium     Closed fracture of metacarpal bone 04/06/2018     Priority: Medium     Abnormal magnetic resonance imaging of bone 04/06/2018     Priority: Medium     Hypokalemia 08/10/2013     " Priority: Medium     Nausea & vomiting 08/10/2013     Priority: Medium        Past Medical History:    Past Medical History:   Diagnosis Date     Anxiety disorder      Bipolar disorder (H)        Past Surgical History:    Past Surgical History:   Procedure Laterality Date     ENT SURGERY      tonsils, adenoids, turbulance       Family History:    No family history on file.    Social History:  Marital Status:  Single [1]  Social History     Tobacco Use     Smoking status: Current Every Day Smoker     Packs/day: 0.50     Smokeless tobacco: Never Used     Tobacco comment: parents smoke outside   Substance Use Topics     Alcohol use: No     Drug use: No        Medications:    clonazePAM (KLONOPIN) 0.5 MG tablet  cloNIDine (CATAPRES) 0.1 MG tablet  CloNIDine ER (KAPVAY) 0.1 MG 12 hr tablet  docusate sodium (COLACE) 100 MG capsule  gabapentin (NEURONTIN) 300 MG capsule  GABAPENTIN PO  hydrOXYzine (VISTARIL) 50 MG capsule  ibuprofen (ADVIL/MOTRIN) 800 MG tablet  lamoTRIgine (LAMICTAL) 200 MG tablet  levothyroxine (SYNTHROID/LEVOTHROID) 25 MCG tablet  lurasidone (LATUDA) 80 MG TABS tablet  ranitidine (RANITIDINE 150 MAX STRENGTH) 150 MG tablet  senna (SENOKOT) 8.6 MG tablet          Review of Systems  As mentioned above in the history present illness. All other systems were reviewed and are negative.    Physical Exam   BP: (!) 151/107  Pulse: 88  Temp: 99  F (37.2  C)  Resp: 14  Weight: (!) 157.4 kg (347 lb)  SpO2: 99 %      Physical Exam  Constitutional:       General: She is not in acute distress.     Appearance: She is obese. She is not ill-appearing.   HENT:      Head: Normocephalic and atraumatic.      Right Ear: External ear normal.      Nose: Nose normal.      Mouth/Throat:      Mouth: Mucous membranes are moist.   Eyes:      Conjunctiva/sclera: Conjunctivae normal.   Cardiovascular:      Rate and Rhythm: Normal rate and regular rhythm.      Heart sounds: No murmur heard.  Pulmonary:      Effort: Pulmonary effort is  normal. No respiratory distress.      Breath sounds: Normal breath sounds.   Abdominal:      General: There is no distension.      Tenderness: There is no abdominal tenderness.   Skin:     General: Skin is warm and dry.   Neurological:      General: No focal deficit present.      Mental Status: She is alert and oriented to person, place, and time.         ED Course              ED Course as of 04/02/22 0047   Sat Apr 02, 2022   0006 At bedside. Reviewed labs and head CT with patient and father which look normal. Awaiting urine results.     Procedures              Results for orders placed or performed during the hospital encounter of 04/01/22 (from the past 24 hour(s))   CT Head w/o Contrast    Narrative    EXAM: CT HEAD W/O CONTRAST  LOCATION: [facility]  DATE/TIME: 4/1/2022 11:17 PM    INDICATION: feels head pressure, blurry vision, lightheaded and numb all over.  COMPARISON: None.  TECHNIQUE: Routine CT Head without IV contrast. Multiplanar reformats. Dose reduction techniques were used.    FINDINGS:  INTRACRANIAL CONTENTS: No intracranial hemorrhage, extraaxial collection, or mass effect.  No CT evidence of acute infarct. Normal parenchymal attenuation. Normal ventricles and sulci.     VISUALIZED ORBITS/SINUSES/MASTOIDS: No intraorbital abnormality. No paranasal sinus mucosal disease. No middle ear or mastoid effusion.    BONES/SOFT TISSUES: No acute abnormality.      Impression    IMPRESSION:  1.  Normal head CT.   CBC with platelets differential    Narrative    The following orders were created for panel order CBC with platelets differential.  Procedure                               Abnormality         Status                     ---------                               -----------         ------                     CBC with platelets and d...[[id number]]                      Final result                 Please view results for these tests on the individual orders.   Comprehensive  metabolic panel   Result Value Ref Range    Sodium 139 133 - 144 mmol/L    Potassium 4.2 3.4 - 5.3 mmol/L    Chloride 106 94 - 109 mmol/L    Carbon Dioxide (CO2) 27 20 - 32 mmol/L    Anion Gap 6 3 - 14 mmol/L    Urea Nitrogen 12 7 - 30 mg/dL    Creatinine 0.87 0.52 - 1.04 mg/dL    Calcium 9.0 8.5 - 10.1 mg/dL    Glucose 96 70 - 99 mg/dL    Alkaline Phosphatase 43 40 - 150 U/L    AST 25 0 - 45 U/L    ALT 42 0 - 50 U/L    Protein Total 6.7 (L) 6.8 - 8.8 g/dL    Albumin 3.6 3.4 - 5.0 g/dL    Bilirubin Total 0.3 0.2 - 1.3 mg/dL    GFR Estimate >90 >60 mL/min/1.73m2   CBC with platelets and differential   Result Value Ref Range    WBC Count 9.3 4.0 - 11.0 10e3/uL    RBC Count 5.10 3.80 - 5.20 10e6/uL    Hemoglobin 15.2 11.7 - 15.7 g/dL    Hematocrit 45.1 35.0 - 47.0 %    MCV 88 78 - 100 fL    MCH 29.8 26.5 - 33.0 pg    MCHC 33.7 31.5 - 36.5 g/dL    RDW 13.0 10.0 - 15.0 %    Platelet Count 204 150 - 450 10e3/uL    % Neutrophils 44 %    % Lymphocytes 45 %    % Monocytes 8 %    % Eosinophils 2 %    % Basophils 1 %    % Immature Granulocytes 0 %    NRBCs per 100 WBC 0 <1 /100    Absolute Neutrophils 4.1 1.6 - 8.3 10e3/uL    Absolute Lymphocytes 4.1 0.8 - 5.3 10e3/uL    Absolute Monocytes 0.8 0.0 - 1.3 10e3/uL    Absolute Eosinophils 0.2 0.0 - 0.7 10e3/uL    Absolute Basophils 0.1 0.0 - 0.2 10e3/uL    Absolute Immature Granulocytes 0.0 <=0.4 10e3/uL    Absolute NRBCs 0.0 10e3/uL   Alcohol level blood   Result Value Ref Range    Alcohol ethyl <0.01 <=0.01 g/dL   UA with Microscopic reflex to Culture    Specimen: Urine, Midstream   Result Value Ref Range    Color Urine Yellow Colorless, Straw, Light Yellow, Yellow    Appearance Urine Clear Clear    Glucose Urine Negative Negative mg/dL    Bilirubin Urine Negative Negative    Ketones Urine 5  (A) Negative mg/dL    Specific Gravity Urine 1.019 1.003 - 1.035    Blood Urine Large (A) Negative    pH Urine 6.0 5.0 - 7.0    Protein Albumin Urine Negative Negative mg/dL    Urobilinogen  Urine 2.0 Normal, 2.0 mg/dL    Nitrite Urine Negative Negative    Leukocyte Esterase Urine Negative Negative    Bacteria Urine Few (A) None Seen /HPF    Mucus Urine Present (A) None Seen /LPF    RBC Urine 11 (H) <=2 /HPF    WBC Urine 1 <=5 /HPF    Squamous Epithelials Urine 1 <=1 /HPF    Narrative    Urine Culture not indicated   HCG qualitative urine (UPT)   Result Value Ref Range    hCG Urine Qualitative Negative Negative   Urine Drugs of Abuse Screen    Narrative    The following orders were created for panel order Urine Drugs of Abuse Screen.  Procedure                               Abnormality         Status                     ---------                               -----------         ------                     Urine Drugs of Abuse Scr...[[id number]]  Abnormal            Final result                 Please view results for these tests on the individual orders.   Urine Drugs of Abuse Screen Panel 13   Result Value Ref Range    Cannabinoids (97-ymw-1-carboxy-9-THC) Detected (A) Not Detected, Indeterminate    Phencyclidine Not Detected Not Detected, Indeterminate    Cocaine (Benzoylecgonine) Not Detected Not Detected, Indeterminate    Methamphetamine (d-Methamphetamine) Not Detected Not Detected, Indeterminate    Opiates (Morphine) Not Detected Not Detected, Indeterminate    Amphetamine (d-Amphetamine) Not Detected Not Detected, Indeterminate    Benzodiazepines (Nordiazepam) Not Detected Not Detected, Indeterminate    Tricyclic Antidepressants (Desipramine) Detected (A) Not Detected, Indeterminate    Methadone Not Detected Not Detected, Indeterminate    Barbiturates (Butalbital) Not Detected Not Detected, Indeterminate    Oxycodone Not Detected Not Detected, Indeterminate    Propoxyphene (Norpropoxyphene) Not Detected Not Detected, Indeterminate    Buprenorphine Not Detected Not Detected, Indeterminate       Medications   0.9% sodium chloride BOLUS (0 mLs Intravenous Stopped 4/2/22 0040)     Followed by   sodium  "chloride 0.9% infusion (has no administration in time range)       Assessments & Plan (with Medical Decision Making)   21 year old female with history of schizophrenia, autism, autoimmune hypothyroidism, anxiety, PTSD, and GERD who is accompanied by her father for evaluation of feeling \"numb all over\".  She states her hands feel very cold.  She complains of \"glossed over\" and blurry vision.  Head pressure.  Feeling shocking sensation in her head.   All of her symptoms started about 2 hours ago.  Denies fever or chills.  Slightly nauseated and states she is having \"heartburn\". No vomiting or diarrhea.  No dysuria, but endorses urinary frequency.  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  She is here visiting her father who she stays with every other weekend.  Father states that she has not had these type of symptoms before which concerned him. She took Excedrin migraine tonight with her other medications. She otherwise is not on any new medications. She has not missed any of her medications.  Patient uses marijuana and delta8 gummies. She denies using any marijuana today.   Current every day smoker.    On exam she is in no acute distress. Morbidly obese. Alert and oriented. No worrisome exam findings. No neuro deficit.  CBC, CMP are unremarkable. Etoh negative.  UA with 5 ketones, large blood and 11 RBCs, no evidence of infection.  Head CT is negative.    Unclear cause for her constellation of symptoms.  This could be a migraine or tension headache variant. Review of her records from visit with her PCP on 3/9/2022 reports chronic tension headaches that has been ongoing and reported intermittent shock feeling in the head with these.  She is being treated with amitriptyline for this.  I considered side effects of her medications, using marijuana or psychosomatic as other etiology. I recommend home and rest. Return for any worsening symptoms and close follow-up with her PCP on Monday.        Discharge Medication " List as of 4/2/2022 12:40 AM          Final diagnoses:   Pressure in head   Numbness       4/1/2022   [facility] EMERGENCY DEPT     Jennifer, MILES Cardoso CNP  04/02/22 0047

## 2022-04-02 NOTE — ED TRIAGE NOTES
Patient here with multiple symptoms including blurred vision hyperventilation, feeling unsteady and head pressure. She states the blurred vision and the feeling unsteady started about an hour ago and the head pressure has been ongoing for a couple months.

## 2022-04-29 ENCOUNTER — HOSPITAL ENCOUNTER (INPATIENT)
Facility: CLINIC | Age: 22
LOS: 5 days | Discharge: HOME | End: 2022-05-06
Attending: EMERGENCY MEDICINE | Admitting: PSYCHIATRY & NEUROLOGY
Payer: MEDICAID

## 2022-04-29 DIAGNOSIS — R45.851 SUICIDAL IDEATION: ICD-10-CM

## 2022-04-29 DIAGNOSIS — Z11.52 ENCOUNTER FOR SCREENING LABORATORY TESTING FOR SEVERE ACUTE RESPIRATORY SYNDROME CORONAVIRUS 2 (SARS-COV-2): ICD-10-CM

## 2022-04-29 DIAGNOSIS — F20.9 SCHIZOPHRENIA, UNSPECIFIED TYPE (H): ICD-10-CM

## 2022-04-29 DIAGNOSIS — N30.00 ACUTE CYSTITIS WITHOUT HEMATURIA: ICD-10-CM

## 2022-04-29 DIAGNOSIS — F99 INSOMNIA DUE TO OTHER MENTAL DISORDER: ICD-10-CM

## 2022-04-29 DIAGNOSIS — F20.0 PARANOID SCHIZOPHRENIA (H): Primary | ICD-10-CM

## 2022-04-29 DIAGNOSIS — F51.05 INSOMNIA DUE TO OTHER MENTAL DISORDER: ICD-10-CM

## 2022-04-29 LAB
AMPHETAMINES UR QL SCN: ABNORMAL
BARBITURATES UR QL: ABNORMAL
BENZODIAZ UR QL: ABNORMAL
CANNABINOIDS UR QL SCN: ABNORMAL
COCAINE UR QL: ABNORMAL
HCG UR QL: NEGATIVE
OPIATES UR QL SCN: ABNORMAL
SARS-COV-2 RNA RESP QL NAA+PROBE: NEGATIVE

## 2022-04-29 PROCEDURE — 90791 PSYCH DIAGNOSTIC EVALUATION: CPT

## 2022-04-29 PROCEDURE — 99285 EMERGENCY DEPT VISIT HI MDM: CPT | Performed by: EMERGENCY MEDICINE

## 2022-04-29 PROCEDURE — 80307 DRUG TEST PRSMV CHEM ANLYZR: CPT | Performed by: EMERGENCY MEDICINE

## 2022-04-29 PROCEDURE — C9803 HOPD COVID-19 SPEC COLLECT: HCPCS | Performed by: EMERGENCY MEDICINE

## 2022-04-29 PROCEDURE — U0003 INFECTIOUS AGENT DETECTION BY NUCLEIC ACID (DNA OR RNA); SEVERE ACUTE RESPIRATORY SYNDROME CORONAVIRUS 2 (SARS-COV-2) (CORONAVIRUS DISEASE [COVID-19]), AMPLIFIED PROBE TECHNIQUE, MAKING USE OF HIGH THROUGHPUT TECHNOLOGIES AS DESCRIBED BY CMS-2020-01-R: HCPCS | Performed by: EMERGENCY MEDICINE

## 2022-04-29 PROCEDURE — 81025 URINE PREGNANCY TEST: CPT | Performed by: EMERGENCY MEDICINE

## 2022-04-29 PROCEDURE — 250N000013 HC RX MED GY IP 250 OP 250 PS 637: Performed by: EMERGENCY MEDICINE

## 2022-04-29 RX ORDER — HYDROXYZINE HYDROCHLORIDE 50 MG/1
50 TABLET, FILM COATED ORAL 3 TIMES DAILY PRN
Status: DISCONTINUED | OUTPATIENT
Start: 2022-04-29 | End: 2022-05-01

## 2022-04-29 RX ORDER — ACETAMINOPHEN 325 MG/1
650 TABLET ORAL EVERY 6 HOURS PRN
Status: DISCONTINUED | OUTPATIENT
Start: 2022-04-29 | End: 2022-05-01

## 2022-04-29 RX ORDER — ERGOCALCIFEROL 1.25 MG/1
50000 CAPSULE, LIQUID FILLED ORAL
Status: DISCONTINUED | OUTPATIENT
Start: 2022-05-01 | End: 2022-05-01

## 2022-04-29 RX ORDER — ACETAMINOPHEN 325 MG/1
650 TABLET ORAL EVERY 4 HOURS PRN
Status: DISCONTINUED | OUTPATIENT
Start: 2022-04-29 | End: 2022-04-29

## 2022-04-29 RX ORDER — DIVALPROEX SODIUM 500 MG/1
500 TABLET, EXTENDED RELEASE ORAL 2 TIMES DAILY
Status: DISCONTINUED | OUTPATIENT
Start: 2022-04-29 | End: 2022-05-01

## 2022-04-29 RX ORDER — GABAPENTIN 600 MG/1
600 TABLET ORAL DAILY PRN
Status: DISCONTINUED | OUTPATIENT
Start: 2022-04-30 | End: 2022-05-01

## 2022-04-29 RX ORDER — TRAZODONE HYDROCHLORIDE 50 MG/1
50 TABLET, FILM COATED ORAL AT BEDTIME
Status: DISCONTINUED | OUTPATIENT
Start: 2022-04-29 | End: 2022-05-01

## 2022-04-29 RX ORDER — ZIPRASIDONE HYDROCHLORIDE 20 MG/1
60 CAPSULE ORAL 2 TIMES DAILY WITH MEALS
Status: DISCONTINUED | OUTPATIENT
Start: 2022-04-29 | End: 2022-05-01

## 2022-04-29 RX ORDER — FAMOTIDINE 20 MG/1
40 TABLET, FILM COATED ORAL DAILY
Status: DISCONTINUED | OUTPATIENT
Start: 2022-04-30 | End: 2022-05-06 | Stop reason: HOSPADM

## 2022-04-29 RX ORDER — IBUPROFEN 200 MG
800 TABLET ORAL 3 TIMES DAILY PRN
Status: DISCONTINUED | OUTPATIENT
Start: 2022-04-29 | End: 2022-05-06 | Stop reason: HOSPADM

## 2022-04-29 RX ORDER — LAMOTRIGINE 200 MG/1
200 TABLET ORAL 2 TIMES DAILY
Status: DISCONTINUED | OUTPATIENT
Start: 2022-04-29 | End: 2022-05-01

## 2022-04-29 RX ORDER — NITROFURANTOIN 25; 75 MG/1; MG/1
100 CAPSULE ORAL EVERY 12 HOURS SCHEDULED
Status: DISCONTINUED | OUTPATIENT
Start: 2022-04-29 | End: 2022-05-01

## 2022-04-29 RX ORDER — SENNOSIDES 8.6 MG
1 TABLET ORAL EVERY EVENING
Status: DISCONTINUED | OUTPATIENT
Start: 2022-04-29 | End: 2022-05-01

## 2022-04-29 RX ORDER — DOCUSATE SODIUM 100 MG/1
100 CAPSULE, LIQUID FILLED ORAL 2 TIMES DAILY
Status: DISCONTINUED | OUTPATIENT
Start: 2022-04-29 | End: 2022-05-01

## 2022-04-29 RX ORDER — GABAPENTIN 600 MG/1
600-1200 TABLET ORAL EVERY EVENING
Status: DISCONTINUED | OUTPATIENT
Start: 2022-04-29 | End: 2022-05-01

## 2022-04-29 RX ORDER — LEVOTHYROXINE SODIUM 50 UG/1
50 TABLET ORAL AT BEDTIME
Status: DISCONTINUED | OUTPATIENT
Start: 2022-04-29 | End: 2022-05-01

## 2022-04-29 RX ORDER — NICOTINE 21 MG/24HR
1 PATCH, TRANSDERMAL 24 HOURS TRANSDERMAL ONCE
Status: COMPLETED | OUTPATIENT
Start: 2022-04-29 | End: 2022-05-01

## 2022-04-29 RX ADMIN — ACETAMINOPHEN 650 MG: 325 TABLET ORAL at 22:49

## 2022-04-29 RX ADMIN — NITROFURANTOIN (MONOHYDRATE/MACROCRYSTALS) 100 MG: 75; 25 CAPSULE ORAL at 21:15

## 2022-04-29 ASSESSMENT — ENCOUNTER SYMPTOMS
DECREASED CONCENTRATION: 1
SLEEP DISTURBANCE: 1
DYSPHORIC MOOD: 1
HALLUCINATIONS: 1
NERVOUS/ANXIOUS: 1

## 2022-04-30 ENCOUNTER — TELEPHONE (OUTPATIENT)
Dept: BEHAVIORAL HEALTH | Facility: CLINIC | Age: 22
End: 2022-04-30
Payer: MEDICAID

## 2022-04-30 PROCEDURE — 250N000013 HC RX MED GY IP 250 OP 250 PS 637: Performed by: EMERGENCY MEDICINE

## 2022-04-30 RX ORDER — POLYETHYLENE GLYCOL 3350 17 G/17G
1 POWDER, FOR SOLUTION ORAL DAILY PRN
COMMUNITY

## 2022-04-30 RX ORDER — CHOLECALCIFEROL (VITAMIN D3) 1250 MCG
1250 CAPSULE ORAL
COMMUNITY
End: 2023-05-25

## 2022-04-30 RX ORDER — ONDANSETRON 8 MG/1
8 TABLET, FILM COATED ORAL EVERY 8 HOURS PRN
COMMUNITY

## 2022-04-30 RX ORDER — ACETAMINOPHEN 325 MG/1
650 TABLET ORAL EVERY 8 HOURS PRN
COMMUNITY

## 2022-04-30 RX ORDER — AMITRIPTYLINE HYDROCHLORIDE 10 MG/1
10 TABLET ORAL AT BEDTIME
COMMUNITY
End: 2022-04-30

## 2022-04-30 RX ORDER — NITROFURANTOIN 25; 75 MG/1; MG/1
100 CAPSULE ORAL 2 TIMES DAILY
Status: ON HOLD | COMMUNITY
End: 2022-05-06

## 2022-04-30 RX ORDER — DIVALPROEX SODIUM 500 MG/1
500 TABLET, EXTENDED RELEASE ORAL 2 TIMES DAILY
COMMUNITY
End: 2022-07-23

## 2022-04-30 RX ORDER — ZIPRASIDONE HYDROCHLORIDE 60 MG/1
60 CAPSULE ORAL 2 TIMES DAILY WITH MEALS
Status: ON HOLD | COMMUNITY
End: 2022-05-06

## 2022-04-30 RX ORDER — TRAZODONE HYDROCHLORIDE 50 MG/1
150 TABLET, FILM COATED ORAL AT BEDTIME
COMMUNITY

## 2022-04-30 RX ORDER — FAMOTIDINE 40 MG/1
40 TABLET, FILM COATED ORAL DAILY
COMMUNITY
End: 2022-07-23

## 2022-04-30 RX ORDER — OMEPRAZOLE 40 MG/1
40 CAPSULE, DELAYED RELEASE ORAL DAILY
Status: ON HOLD | COMMUNITY
End: 2022-05-06

## 2022-04-30 RX ADMIN — TRAZODONE HYDROCHLORIDE 50 MG: 50 TABLET ORAL at 22:43

## 2022-04-30 RX ADMIN — DOCUSATE SODIUM 100 MG: 100 CAPSULE, LIQUID FILLED ORAL at 19:26

## 2022-04-30 RX ADMIN — LAMOTRIGINE 200 MG: 200 TABLET ORAL at 19:26

## 2022-04-30 RX ADMIN — GABAPENTIN 600 MG: 600 TABLET, FILM COATED ORAL at 00:23

## 2022-04-30 RX ADMIN — DIVALPROEX SODIUM 500 MG: 250 TABLET, FILM COATED, EXTENDED RELEASE ORAL at 00:23

## 2022-04-30 RX ADMIN — LEVOTHYROXINE SODIUM 50 MCG: 50 TABLET ORAL at 00:23

## 2022-04-30 RX ADMIN — GABAPENTIN 1200 MG: 600 TABLET, FILM COATED ORAL at 19:27

## 2022-04-30 RX ADMIN — LEVOTHYROXINE SODIUM 50 MCG: 50 TABLET ORAL at 22:47

## 2022-04-30 RX ADMIN — ACETAMINOPHEN 650 MG: 325 TABLET ORAL at 06:52

## 2022-04-30 RX ADMIN — LAMOTRIGINE 200 MG: 200 TABLET ORAL at 07:50

## 2022-04-30 RX ADMIN — DIVALPROEX SODIUM 500 MG: 250 TABLET, FILM COATED, EXTENDED RELEASE ORAL at 07:44

## 2022-04-30 RX ADMIN — NICOTINE 1 PATCH: 21 PATCH, EXTENDED RELEASE TRANSDERMAL at 00:24

## 2022-04-30 RX ADMIN — SERTRALINE HYDROCHLORIDE 50 MG: 50 TABLET ORAL at 07:43

## 2022-04-30 RX ADMIN — SENNOSIDES 1 TABLET: 8.6 TABLET ORAL at 19:27

## 2022-04-30 RX ADMIN — TRAZODONE HYDROCHLORIDE 50 MG: 50 TABLET ORAL at 00:23

## 2022-04-30 RX ADMIN — DOCUSATE SODIUM 100 MG: 100 CAPSULE, LIQUID FILLED ORAL at 00:23

## 2022-04-30 RX ADMIN — FAMOTIDINE 40 MG: 20 TABLET ORAL at 07:50

## 2022-04-30 RX ADMIN — SENNOSIDES 1 TABLET: 8.6 TABLET ORAL at 00:24

## 2022-04-30 RX ADMIN — ZIPRASIDONE HCL 60 MG: 60 CAPSULE ORAL at 00:24

## 2022-04-30 RX ADMIN — DOCUSATE SODIUM 100 MG: 100 CAPSULE, LIQUID FILLED ORAL at 07:44

## 2022-04-30 RX ADMIN — ZIPRASIDONE HCL 60 MG: 60 CAPSULE ORAL at 07:49

## 2022-04-30 RX ADMIN — NITROFURANTOIN (MONOHYDRATE/MACROCRYSTALS) 100 MG: 75; 25 CAPSULE ORAL at 19:28

## 2022-04-30 RX ADMIN — LAMOTRIGINE 200 MG: 200 TABLET ORAL at 00:23

## 2022-04-30 RX ADMIN — NITROFURANTOIN (MONOHYDRATE/MACROCRYSTALS) 100 MG: 75; 25 CAPSULE ORAL at 08:14

## 2022-04-30 RX ADMIN — DIVALPROEX SODIUM 500 MG: 250 TABLET, FILM COATED, EXTENDED RELEASE ORAL at 19:26

## 2022-04-30 RX ADMIN — ZIPRASIDONE HCL 60 MG: 60 CAPSULE ORAL at 19:27

## 2022-04-30 RX ADMIN — ACETAMINOPHEN 650 MG: 325 TABLET ORAL at 13:21
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
## 2022-04-30 NOTE — PHARMACY-ADMISSION MEDICATION HISTORY
Admission Medication History Completed by Pharmacy    See [facility] Admission Navigator for allergy information, preferred outpatient pharmacy, prior to admission medications and immunization status.     Medication history sources:  Patient's mother via phone (Josefina [phone]); [employer] dispense report    Pertinent changes made to PTA medication list:  Added: All PTA medications were added to the list below (confirmed with patient's mother and pharmacy fill records)   Deleted: The following medications were removed from patient's medication list and confirmed with patient's mother and pharmacy fill records:   - Lurasidone 80 mg tablets   - Ranitidine 150 mg tablets   - Duplicate gabapentin orders   - Clonidine 0.1 mg tablets   - Clonidine 0.1 ER tablets   - Clonazepam 0.5 mg tablets   Changed:   - Levothyroxine 25mcg tablet every morning --> at bedtime (per patient's mother, she gives this medication at bedtime due to the patient taking famotidine and omeprazole in the morning)    Additional medication history information:   - Patient's mother was a good historian and denies that Yasmine is any additional Rx/OTC medications other than the ones listed below.    Prior to Admission medications    Medication Sig Last Dose Taking? Auth Provider   acetaminophen (TYLENOL) 325 MG tablet Take 650 mg by mouth every 8 hours as needed for mild pain 4/30/2022 Yes Unknown, Entered By History   cholecalciferol (VITAMIN D3) 1250 mcg (69252 units) capsule Take 1,250 mcg by mouth every 7 days 4/24/2022 Yes Unknown, Entered By History   divalproex sodium extended-release (DEPAKOTE ER) 500 MG 24 hr tablet Take 500 mg by mouth 2 times daily 4/30/2022 at AM Dose Yes Unknown, Entered By History   docusate sodium (COLACE) 100 MG capsule Take 100 mg by mouth 2 times daily 4/30/2022 Yes Reported, Patient   etonogestrel (NEXPLANON) 68 MG IMPL 1 each by Subdermal route once  Yes Unknown, Entered By History   famotidine (PEPCID) 40 MG  tablet Take 40 mg by mouth daily 4/30/2022 Yes Unknown, Entered By History   gabapentin (NEURONTIN) 600 MG tablet Take 1 tablet (600 mg) by mouth daily as needed, and take 2 tablets (1200 mg) by mouth at bedtime scheduled 4/29/2022 Yes Unknown, Entered By History   hydrOXYzine (VISTARIL) 50 MG capsule Take 50 mg by mouth 3 times daily as needed 4/29/2022 Yes Unknown, Entered By History   ibuprofen (ADVIL/MOTRIN) 800 MG tablet Take 800 mg by mouth every 8 hours as needed for moderate pain Past Week Yes Reported, Patient   lamoTRIgine (LAMICTAL) 200 MG tablet Take 200 mg by mouth 2 times daily 4/30/2022 at AM Dose Yes Reported, Patient   levothyroxine (SYNTHROID/LEVOTHROID) 25 MCG tablet Take 25 mcg by mouth At Bedtime 4/29/2022 Yes Unknown, Entered By History   nitroFURantoin macrocrystal-monohydrate (MACROBID) 100 MG capsule Take 100 mg by mouth 2 times daily For 5 days 4/30/2022 at AM Dose Yes Unknown, Entered By History   omeprazole (PRILOSEC) 40 MG DR capsule Take 40 mg by mouth daily Past Week Yes Unknown, Entered By History   ondansetron (ZOFRAN) 8 MG tablet Take 8 mg by mouth every 8 hours as needed for nausea Past Week Yes Unknown, Entered By History   polyethylene glycol (MIRALAX) 17 g packet Take 1 packet by mouth daily as needed for constipation Past Week Yes Unknown, Entered By History   senna (SENOKOT) 8.6 MG tablet Take 1 tablet by mouth every evening  Past Week Yes Reported, Patient   sertraline (ZOLOFT) 50 MG tablet Take 50 mg by mouth daily 4/30/2022 Yes Unknown, Entered By History   traZODone (DESYREL) 50 MG tablet Take 50 mg by mouth At Bedtime 4/29/2022 Yes Unknown, Entered By History   ziprasidone (GEODON) 60 MG capsule Take 60 mg by mouth 2 times daily (with meals) 4/30/2022 at AM Dose Yes Unknown, Entered By History          Date completed: 04/30/22    Medication history completed by:   Carol Tee, PharmD   [facility]  Emergency Department: Ascom  *[phone]

## 2022-04-30 NOTE — ED NOTES
Handoff report given to HUGO Nielsen.  Informed of course of ED stay and plan of care.  Gia verbalized understanding.
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
## 2022-04-30 NOTE — SAFE
Yasminejustine Smith  April 29, 2022  SAFE Note    Critical Safety Issues: Suicidal risk      Current Suicidal Ideation/Self-Injurious Concerns/Methods: Cutting and Ingestion OD on meds      Current or Historical Inappropriate Sexual Behavior: No      Current or Historical Aggression/Homicidal Ideation: None - N/A      Triggers: Medications not working. Recent challenging therapeutic revelations    Guardianship Status: [facility] Guardian: is under the guardianship of Pelon Smith, father. . Guardianship paperwork is in [employer] / Epic ACP tab.     Updated care team: Yes: Dr. MARLIN Beltre MD; Intake - [place]    For additional details see full [facility] assessment.       Octavio Carlson MA

## 2022-05-01 PROBLEM — F20.9 SCHIZOPHRENIA (H): Status: ACTIVE | Noted: 2022-05-01

## 2022-05-01 PROCEDURE — 250N000013 HC RX MED GY IP 250 OP 250 PS 637: Performed by: EMERGENCY MEDICINE

## 2022-05-01 PROCEDURE — 124N000002 HC R&B MH UMMC

## 2022-05-01 PROCEDURE — 250N000013 HC RX MED GY IP 250 OP 250 PS 637: Performed by: CLINICAL NURSE SPECIALIST

## 2022-05-01 RX ORDER — AMOXICILLIN 250 MG
1 CAPSULE ORAL 2 TIMES DAILY PRN
Status: DISCONTINUED | OUTPATIENT
Start: 2022-05-01 | End: 2022-05-06 | Stop reason: HOSPADM

## 2022-05-01 RX ORDER — MAGNESIUM HYDROXIDE/ALUMINUM HYDROXICE/SIMETHICONE 120; 1200; 1200 MG/30ML; MG/30ML; MG/30ML
30 SUSPENSION ORAL EVERY 4 HOURS PRN
Status: DISCONTINUED | OUTPATIENT
Start: 2022-05-01 | End: 2022-05-06 | Stop reason: HOSPADM

## 2022-05-01 RX ORDER — DOCUSATE SODIUM 100 MG/1
100 CAPSULE, LIQUID FILLED ORAL 2 TIMES DAILY
Status: DISCONTINUED | OUTPATIENT
Start: 2022-05-01 | End: 2022-05-06 | Stop reason: HOSPADM

## 2022-05-01 RX ORDER — ZIPRASIDONE HYDROCHLORIDE 20 MG/1
60 CAPSULE ORAL 2 TIMES DAILY WITH MEALS
Status: DISCONTINUED | OUTPATIENT
Start: 2022-05-01 | End: 2022-05-02

## 2022-05-01 RX ORDER — LANOLIN ALCOHOL/MO/W.PET/CERES
3 CREAM (GRAM) TOPICAL
Status: DISCONTINUED | OUTPATIENT
Start: 2022-05-01 | End: 2022-05-06 | Stop reason: HOSPADM

## 2022-05-01 RX ORDER — SENNOSIDES 8.6 MG
8.6 TABLET ORAL AT BEDTIME
Status: DISCONTINUED | OUTPATIENT
Start: 2022-05-01 | End: 2022-05-06 | Stop reason: HOSPADM

## 2022-05-01 RX ORDER — TRAZODONE HYDROCHLORIDE 50 MG/1
50 TABLET, FILM COATED ORAL AT BEDTIME
Status: DISCONTINUED | OUTPATIENT
Start: 2022-05-01 | End: 2022-05-06 | Stop reason: HOSPADM

## 2022-05-01 RX ORDER — GABAPENTIN 300 MG/1
600 CAPSULE ORAL DAILY PRN
Status: DISCONTINUED | OUTPATIENT
Start: 2022-05-01 | End: 2022-05-06 | Stop reason: HOSPADM

## 2022-05-01 RX ORDER — POLYETHYLENE GLYCOL 3350 17 G/17G
17 POWDER, FOR SOLUTION ORAL DAILY PRN
Status: DISCONTINUED | OUTPATIENT
Start: 2022-05-01 | End: 2022-05-01

## 2022-05-01 RX ORDER — POLYETHYLENE GLYCOL 3350 17 G/17G
17 POWDER, FOR SOLUTION ORAL DAILY PRN
Status: DISCONTINUED | OUTPATIENT
Start: 2022-05-01 | End: 2022-05-06 | Stop reason: HOSPADM

## 2022-05-01 RX ORDER — HYDROXYZINE HYDROCHLORIDE 25 MG/1
25-50 TABLET, FILM COATED ORAL EVERY 4 HOURS PRN
Status: DISCONTINUED | OUTPATIENT
Start: 2022-05-01 | End: 2022-05-02

## 2022-05-01 RX ORDER — DIVALPROEX SODIUM 500 MG/1
500 TABLET, EXTENDED RELEASE ORAL 2 TIMES DAILY
Status: DISCONTINUED | OUTPATIENT
Start: 2022-05-01 | End: 2022-05-06 | Stop reason: HOSPADM

## 2022-05-01 RX ORDER — GABAPENTIN 300 MG/1
600-1200 CAPSULE ORAL 3 TIMES DAILY
Status: DISCONTINUED | OUTPATIENT
Start: 2022-05-01 | End: 2022-05-02

## 2022-05-01 RX ORDER — ACETAMINOPHEN 325 MG/1
650 TABLET ORAL EVERY 4 HOURS PRN
Status: DISCONTINUED | OUTPATIENT
Start: 2022-05-01 | End: 2022-05-06 | Stop reason: HOSPADM

## 2022-05-01 RX ORDER — NICOTINE 21 MG/24HR
1 PATCH, TRANSDERMAL 24 HOURS TRANSDERMAL DAILY
Status: DISCONTINUED | OUTPATIENT
Start: 2022-05-01 | End: 2022-05-01

## 2022-05-01 RX ORDER — ERGOCALCIFEROL 1.25 MG/1
50000 CAPSULE, LIQUID FILLED ORAL
Status: DISCONTINUED | OUTPATIENT
Start: 2022-05-08 | End: 2022-05-02

## 2022-05-01 RX ORDER — LAMOTRIGINE 200 MG/1
200 TABLET ORAL 2 TIMES DAILY
Status: DISCONTINUED | OUTPATIENT
Start: 2022-05-01 | End: 2022-05-06 | Stop reason: HOSPADM

## 2022-05-01 RX ORDER — NICOTINE 21 MG/24HR
1 PATCH, TRANSDERMAL 24 HOURS TRANSDERMAL DAILY
Status: DISCONTINUED | OUTPATIENT
Start: 2022-05-01 | End: 2022-05-06 | Stop reason: HOSPADM

## 2022-05-01 RX ORDER — LEVOTHYROXINE SODIUM 50 UG/1
50 TABLET ORAL AT BEDTIME
Status: DISCONTINUED | OUTPATIENT
Start: 2022-05-01 | End: 2022-05-06 | Stop reason: HOSPADM

## 2022-05-01 RX ORDER — CALCIUM CARBONATE 500 MG/1
500 TABLET, CHEWABLE ORAL 2 TIMES DAILY PRN
Status: DISCONTINUED | OUTPATIENT
Start: 2022-05-01 | End: 2022-05-06 | Stop reason: HOSPADM

## 2022-05-01 RX ORDER — NITROFURANTOIN 25; 75 MG/1; MG/1
100 CAPSULE ORAL EVERY 12 HOURS SCHEDULED
Status: COMPLETED | OUTPATIENT
Start: 2022-05-01 | End: 2022-05-03

## 2022-05-01 RX ADMIN — POLYETHYLENE GLYCOL 3350 17 G: 17 POWDER, FOR SOLUTION ORAL at 06:50

## 2022-05-01 RX ADMIN — SERTRALINE HYDROCHLORIDE 50 MG: 50 TABLET ORAL at 07:59

## 2022-05-01 RX ADMIN — DOCUSATE SODIUM 100 MG: 100 CAPSULE, LIQUID FILLED ORAL at 19:45

## 2022-05-01 RX ADMIN — ZIPRASIDONE HCL 60 MG: 20 CAPSULE ORAL at 17:45

## 2022-05-01 RX ADMIN — DOCUSATE SODIUM 100 MG: 100 CAPSULE, LIQUID FILLED ORAL at 07:58

## 2022-05-01 RX ADMIN — FAMOTIDINE 40 MG: 20 TABLET ORAL at 07:58

## 2022-05-01 RX ADMIN — LEVOTHYROXINE SODIUM 50 MCG: 50 TABLET ORAL at 21:57

## 2022-05-01 RX ADMIN — NITROFURANTOIN (MONOHYDRATE/MACROCRYSTALS) 100 MG: 75; 25 CAPSULE ORAL at 07:58

## 2022-05-01 RX ADMIN — NICOTINE 1 PATCH: 21 PATCH, EXTENDED RELEASE TRANSDERMAL at 06:50

## 2022-05-01 RX ADMIN — TRAZODONE HYDROCHLORIDE 50 MG: 50 TABLET ORAL at 21:57

## 2022-05-01 RX ADMIN — SENNOSIDES 8.6 MG: 8.6 TABLET ORAL at 21:57

## 2022-05-01 RX ADMIN — ERGOCALCIFEROL 50000 UNITS: 1.25 CAPSULE, LIQUID FILLED ORAL at 08:55

## 2022-05-01 RX ADMIN — DIVALPROEX SODIUM 500 MG: 250 TABLET, FILM COATED, EXTENDED RELEASE ORAL at 07:58

## 2022-05-01 RX ADMIN — POLYETHYLENE GLYCOL 3350 17 G: 17 POWDER, FOR SOLUTION ORAL at 23:52

## 2022-05-01 RX ADMIN — GABAPENTIN 600 MG: 300 CAPSULE ORAL at 19:45

## 2022-05-01 RX ADMIN — ZIPRASIDONE HCL 60 MG: 60 CAPSULE ORAL at 07:58

## 2022-05-01 RX ADMIN — CALCIUM CARBONATE (ANTACID) CHEW TAB 500 MG 500 MG: 500 CHEW TAB at 16:04

## 2022-05-01 RX ADMIN — LAMOTRIGINE 200 MG: 200 TABLET ORAL at 19:45

## 2022-05-01 RX ADMIN — DIVALPROEX SODIUM 500 MG: 500 TABLET, FILM COATED, EXTENDED RELEASE ORAL at 19:45

## 2022-05-01 RX ADMIN — IBUPROFEN 800 MG: 200 TABLET, FILM COATED ORAL at 16:04

## 2022-05-01 RX ADMIN — LAMOTRIGINE 200 MG: 200 TABLET ORAL at 07:58

## 2022-05-01 RX ADMIN — HYDROXYZINE HYDROCHLORIDE 50 MG: 50 TABLET, FILM COATED ORAL at 01:31

## 2022-05-01 RX ADMIN — NITROFURANTOIN (MONOHYDRATE/MACROCRYSTALS) 100 MG: 75; 25 CAPSULE ORAL at 20:03

## 2022-05-01 ASSESSMENT — ACTIVITIES OF DAILY LIVING (ADL)
HYGIENE/GROOMING: INDEPENDENT
ORAL_HYGIENE: INDEPENDENT
DRESS: SCRUBS (BEHAVIORAL HEALTH)
DRESS: INDEPENDENT
LAUNDRY: WITH SUPERVISION
LAUNDRY: WITH SUPERVISION
ORAL_HYGIENE: INDEPENDENT
HYGIENE/GROOMING: INDEPENDENT

## 2022-05-01 ASSESSMENT — PATIENT HEALTH QUESTIONNAIRE - PHQ9: SUM OF ALL RESPONSES TO PHQ9 QUESTIONS 1 & 2: 6

## 2022-05-01 NOTE — PROGRESS NOTES
"Triage & Transition Services, Extended Care     Therapy Progress Note    Patient: Yasmine goes by \"Yasmien,\" uses she/her pronouns  Date of Service: May 1, 2022  Site of Service: [facility]    Presenting problem:   Yasmine is followed related to Long wait time for admission: Pt is waiting for bed placement. Pt came in due to a decrease in medication efficacy and concern for declinging ability to remain safe.. Please see initial [facility] Crisis Assessment completed by Octavio Carlson  on 4/29/2022 for complete assessment information. Notable concerns include passive suicidal ideation, inability to manage anxiety and trauma..     Individuals Present: Yasmine & Sil Gloria [facility]    Session start: 11:40  Session end: 11:55  Session duration in minutes: 20  CPT utilized: 98062 - Psychotherapy (with patient) - 30 (16-37*) min    Patient was seen virtually ([employer] cart or other teleconferencing device).   Anticipated number of sessions or this episode of care: 1-4    Current Presentation:   Pt was sleepy, however was agreeable to waking up to talk with writer. Pt indicated that she has continued to try and get sleep, however has been unable to stay asleep. Pt indicated that she feels anxious most of the time and states she becomes angry when other suggest she uses a coping skill. Pt stated that she struggles to quiet her brain to use a coping skill. Writer validated the difficulty in this process, and stressed the importance of working towards being able to use her coping skills. Pt and writer talked about grounding prior to coping and practiced a 60 second grounding exercise.  At the conclusion writer asked pt to share what she was thinking about? Pt indicated that she was thinking about animals (per the assignment, name all the animals she could think of for 60 seconds). Writer and pt talked about learning about additional grounding exercises .  Pts father came in towards the end of the session and indicated " "that they have come a long ways to get the pt to what he feels is the appropriate hospital to met pts needs. Pts father indicated that in the past the pt will identify she is \"fine\" while waiting for a bed, and then weeks later the family finds themselves in the same place. Pts father is strongly advocating that the pt follow through with admission.     Mental Status Exam:   Appearance: fatigued  Attitude: cooperative  Eye Contact: fair  Mood: anxious  Affect: appropriate and in normal range  Speech: clear, coherent  Psychomotor Behavior: no evidence of tardive dyskinesia, dystonia, or tics  Thought Process:  logical  Associations: no loose associations  Thought Content: passive suicidal ideation present  Insight: fair  Judgement: fair  Oriented to: time, person, and place  Attention Span and Concentration: fair  Recent and Remote Memory: fair    Diagnosis:     298.9 (F29)  Unspecified Schizophrenia Spectrum - by history     300.02 (F41.1) Generalized Anxiety Disorder - by history     309.81 (F43.10) Posttraumatic Stress Disorder (includes Posttraumatic Stress Disorder for Children 6 Years and Younger)  Without dissociative symptoms - by history         Therapeutic Intervention(s):   Provided active listening, unconditional positive regard, and validation. Coached on coping techniques/relaxation skills to help improve distress tolerance and managing intense emotions.    Treatment Objective(s) Addressed:   The focus of this session was on identifying and practicing coping strategies .     Progress Towards Goals:   Patient reports stable symptoms.    Case Management:   N//A     General Recommendations:   Continue to monitor for harm. Consider: Use clear and concise directions, too many words can be overwhelming and Allow family calls/visits    Plan:   Pt will continue to wait for IP bed.  Pt indicated understanding of the potential wait.Pts parents are taking turns staying with pt.     Sil Gloria [facility] Administration[facility]ODNEY   Licensed " Mental Health Professional (LMHP), [employer] [facility]  [phone]

## 2022-05-01 NOTE — ED NOTES
[facility] ED Mental Health Handoff Note:       Brief HPI:  This is a 21 year old female signed out to me by Dr. Hale.  See initial ED Provider note for full details of the presentation. Interval history is pertinent for nothing.    Home meds reviewed and ordered/administered: Yes    Medically stable for inpatient mental health admission: Yes.    Evaluated by mental health: Yes. The recommendation is for inpatient mental health treatment. Bed search in process    Safety concerns: At the time I received sign out, there were no safety concerns.    Hold Status:  Active Orders   N/A           Exam:   Patient Vitals for the past 24 hrs:   BP Temp Temp src Pulse Resp SpO2   04/30/22 2250 130/81 97.8  F (36.6  C) Oral 89 16 96 %           ED Course:    Medications   nitroFURantoin macrocrystal-monohydrate (MACROBID) capsule 100 mg (100 mg Oral Given 4/30/22 1928)   levothyroxine (SYNTHROID/LEVOTHROID) tablet 50 mcg (50 mcg Oral Given 4/30/22 2247)   famotidine (PEPCID) tablet 40 mg (40 mg Oral Given 4/30/22 0750)   lamoTRIgine (LaMICtal) tablet 200 mg (200 mg Oral Given 4/30/22 1926)   ziprasidone (GEODON) capsule 60 mg (60 mg Oral Given 4/30/22 1927)   divalproex sodium extended-release (DEPAKOTE ER) 24 hr tablet 500 mg (500 mg Oral Given 4/30/22 1926)   hydrOXYzine (ATARAX) tablet 50 mg (50 mg Oral Given 5/1/22 0131)   traZODone (DESYREL) tablet 50 mg (50 mg Oral Given 4/30/22 2243)   vitamin D2 (ERGOCALCIFEROL) 62108 units (1250 mcg) capsule 50,000 Units (has no administration in time range)   sertraline (ZOLOFT) tablet 50 mg (50 mg Oral Given 4/30/22 0743)   gabapentin (NEURONTIN) tablet 600-1,200 mg (1,200 mg Oral Given 4/30/22 1927)   gabapentin (NEURONTIN) tablet 600 mg (has no administration in time range)   sennosides (SENOKOT) tablet 1 tablet (1 tablet Oral Given 4/30/22 1927)   docusate sodium (COLACE) capsule 100 mg (100 mg Oral Given 4/30/22 1926)   acetaminophen (TYLENOL) tablet 650 mg (650 mg Oral  Given 4/30/22 1321)   ibuprofen (ADVIL/MOTRIN) tablet 800 mg ( Oral Canceled Entry 4/30/22 0652)   nicotine (NICODERM CQ) 21 MG/24HR 24 hr patch 1 patch ( Transdermal Canceled Entry 5/1/22 0800)   nicotine Patch in Place (has no administration in time range)   polyethylene glycol (MIRALAX) Packet 17 g (17 g Oral Given 5/1/22 0650)   nicotine (NICODERM CQ) 21 MG/24HR 24 hr patch 1 patch (1 patch Transdermal Patch/Med Removed 5/1/22 0103)            There were no significant events during my shift.    Patient was signed out to the oncoming provider      Impression:    ICD-10-CM    1. Schizophrenia, unspecified type (H)  F20.9    2. Suicidal ideation  R45.851    3. Acute cystitis without hematuria  N30.00        Plan:    1. Awaiting inpatient mental health admission/transfer.      RESULTS:   No results found for this visit on 04/29/22 (from the past 24 hour(s)).          MD Tai DEMPSEY Daniel, MD  05/01/22 0737

## 2022-05-01 NOTE — PLAN OF CARE
05/01/22 8247   Patient Belongings   Did you bring any home meds/supplements to the hospital?  Yes   Disposition of meds  Other (see comment)   Patient Belongings locker   Patient Belongings Put in Hospital Secure Location (Security or Locker, etc.) clothing;medication(s);purse/wallet;shoes   Belongings Search Yes   Clothing Search Yes   Second Staff Day shift PA/RN     -medications (inhaler/other - given to RN)  -purse  -shoes  -mask  -sweater  -tank top x2  -pants x 2  -underwear  -lighter  -necklace    A               Admission:  I am responsible for any personal items that are not sent to the safe or pharmacy.  [facility] is not responsible for loss, theft or damage of any property in my possession.    Signature:  _________________________________ Date: _______  Time: _____                                              Staff Signature:  ____________________________ Date: ________  Time: _____      2nd Staff person, if patient is unable/unwilling to sign:    Signature: ________________________________ Date: ________  Time: _____     Discharge:  [facility] has returned all of my personal belongings:    Signature: _________________________________ Date: ________  Time: _____                                          Staff Signature:  ____________________________ Date: ________  Time: _____

## 2022-05-01 NOTE — ED NOTES
Pt was woken up to inform room was available.  Pt was anxious and worried about her admission and leaving without dad.  Pt was stating she didn't want to go.  Pt calm herself with her dads help and came out to tell writer she was ready to leave now.  Writer arranged transportation.

## 2022-05-01 NOTE — TELEPHONE ENCOUNTER
R: Pt in [place] awaiting placement, unsure of placement preferences    10:50pm bed search  [facility] is posting 0 beds.  [facility] is posting 0 beds.  [facility] is posting 0 beds.  [facility] is posting 0 beds.  [facility] is posting 0 beds.  [facility] is posting 0 beds.  [facility] is posting 0 beds.  [facility] is posting 0 beds.  [facility] is posting 0 beds. Mixed unit 12+. Low acuity only.  [facility] is positing 0 beds. No aggression.  [facility] is posting 0 beds.  [facility] is posting 1 beds. Low acuity only.  [facility] is posting 0 beds.  [facility] is posting 5 beds. Low acuity.  [facility] is posting 2 beds. 72 hr hold required and low acuity only  [facility] is posting 5 beds.  [facility] is posting 2 beds. Vol only, No Hx of aggression, violence or assault. No sexual offenders. No 72 hr holds.  [facility] is posting 6 beds. (Must have the cognitive ability to do programming. No aggressive or violent behavior or recent HX in the last 2 yrs. MH must be primary.)  [facility] is posting 0 beds. Low acuity only. Violence and aggression capped.  [facility] is posting 3 beds. Low acuity, Neg Covid.  [facility] is posting 0 beds. Covid neg. Vol only. Combined adolescent and adult unit. No aggressive or violent behavior. No registered sex offenders.  [facility] is posting 2 beds. Call for details.  [facility] is posting 1 beds.    Pt placed on work list until appropriate placement is available

## 2022-05-01 NOTE — PLAN OF CARE
"  Problem: Plan of Care - These are the overarching goals to be used throughout the patient stay.    Goal: Readiness for Transition of Care  Outcome: Ongoing, Progressing   Goal Outcome Evaluation:      This RN spoke with pt's father (glo yin) at 1500 upon pt's arrival. The father stated pt was going to \"freak out\" if she got a roommate. This RN reassured pt that she can have a private room. Pt then approached this RN stating the same thing about no roommate. This RN reassured her also no roommate.  Pt was pleasant upon arrival after hearing this.                " and other family members in a private house with 3 small steps to enter. and 1 flight of stairs to his bedroom  in the basement . Pt  will be staying on the first floor in a recliner for the a few days. Pt's bathroom has a tub/shower combination, standard toilet  and it is not equipped with grab bars or shower chair./spouse

## 2022-05-01 NOTE — ED NOTES
Pt is pleasant and cooperative.  Pts Dad is in room with her.  Pt denies any thoughts of SI or depression this morning but states she has had a rough time lately and needs med adjustments.
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
## 2022-05-01 NOTE — ED NOTES
Patient reported no bowel movement for 2 days. She requested for laxative. She said she use Miralax at home daily as needed. MD was informed.

## 2022-05-01 NOTE — ED PROVIDER NOTES
[facility] ED Mental Health Handoff Note:       Brief HPI:  This is a 21 year old female signed out to me by Dr. Altamirano.  See initial ED Provider note for full details of the presentation.     Home meds reviewed and ordered/administered: Yes    Medically stable for inpatient mental health admission: Yes.    Evaluated by mental health: Yes. The recommendation is for inpatient mental health treatment. Bed search in process    Safety concerns: At the time I received sign out, there were no safety concerns.    Hold Status:  Active Orders   N/A           Exam:   Patient Vitals for the past 24 hrs:   BP Temp Temp src Pulse Resp SpO2   04/30/22 2250 130/81 97.8  F (36.6  C) Oral 89 16 96 %   04/30/22 0735 124/83 98.1  F (36.7  C) -- 98 16 --   04/30/22 0659 109/67 97.6  F (36.4  C) Oral 70 -- 97 %           ED Course:    Medications   nitroFURantoin macrocrystal-monohydrate (MACROBID) capsule 100 mg (100 mg Oral Given 4/30/22 1928)   levothyroxine (SYNTHROID/LEVOTHROID) tablet 50 mcg (50 mcg Oral Given 4/30/22 2247)   famotidine (PEPCID) tablet 40 mg (40 mg Oral Given 4/30/22 0750)   lamoTRIgine (LaMICtal) tablet 200 mg (200 mg Oral Given 4/30/22 1926)   ziprasidone (GEODON) capsule 60 mg (60 mg Oral Given 4/30/22 1927)   divalproex sodium extended-release (DEPAKOTE ER) 24 hr tablet 500 mg (500 mg Oral Given 4/30/22 1926)   hydrOXYzine (ATARAX) tablet 50 mg (has no administration in time range)   traZODone (DESYREL) tablet 50 mg (50 mg Oral Given 4/30/22 2243)   vitamin D2 (ERGOCALCIFEROL) 55931 units (1250 mcg) capsule 50,000 Units (has no administration in time range)   sertraline (ZOLOFT) tablet 50 mg (50 mg Oral Given 4/30/22 0743)   gabapentin (NEURONTIN) tablet 600-1,200 mg (1,200 mg Oral Given 4/30/22 1927)   gabapentin (NEURONTIN) tablet 600 mg (has no administration in time range)   sennosides (SENOKOT) tablet 1 tablet (1 tablet Oral Given 4/30/22 1927)   docusate sodium (COLACE) capsule 100 mg (100 mg Oral  Given 4/30/22 1926)   acetaminophen (TYLENOL) tablet 650 mg (650 mg Oral Given 4/30/22 1321)   ibuprofen (ADVIL/MOTRIN) tablet 800 mg ( Oral Canceled Entry 4/30/22 0652)   nicotine (NICODERM CQ) 21 MG/24HR 24 hr patch 1 patch (1 patch Transdermal Patch/Med Applied 4/30/22 0024)            There were no significant events during my shift.    Patient was signed out to the oncoming provider, Dr. Hale      Impression:    ICD-10-CM    1. Schizophrenia, unspecified type (H)  F20.9    2. Suicidal ideation  R45.851    3. Acute cystitis without hematuria  N30.00        Plan:    1. Awaiting inpatient mental health admission/transfer.      RESULTS:   No results found for this visit on 04/29/22 (from the past 24 hour(s)).          MD Yamil Bertrand Gregory Townley, MD  04/30/22 4810

## 2022-05-01 NOTE — PLAN OF CARE
"Admission:     Admitted to [facility] for increasing suicidal ideation. Mother is legal guardian and provides consent by phone. States she will fax paperwork to unit tomorrow (Monday) morning. Also consents to continue medications, and comfort meds ordered. Mother states pt does better with female staff. Pt states she feels her medications are not working and wants them adjusted.     Pt states she has been on acute mental health units \"about a dozen ties\" in the past at [facility] and [facility] in [place]. States she has 2 past, impulsive attempts. States she has not engaged in self injurious behavior in 2 months, but has in the past. Reports chronic suicidal ideation for \"as long as I can remember\". Denies current thoughts to harm self and/or others. She does verbally contract for safety on the unit and agrees to notify staff of worsening symptoms. (See chart review for further details and hx)    Presents as cam, polite and cooperative completing admission. Is well groomed, appropriate eye contact. Speech is clear and coherent. Affect is blunted. Oriented to room and unit. Encouraged to notify staff of needs, questions, and concerns. Pt verbalizes understanding.    "

## 2022-05-02 PROCEDURE — 99222 1ST HOSP IP/OBS MODERATE 55: CPT | Mod: AI | Performed by: CLINICAL NURSE SPECIALIST

## 2022-05-02 PROCEDURE — 124N000002 HC R&B MH UMMC

## 2022-05-02 PROCEDURE — 250N000013 HC RX MED GY IP 250 OP 250 PS 637: Performed by: CLINICAL NURSE SPECIALIST

## 2022-05-02 PROCEDURE — 250N000013 HC RX MED GY IP 250 OP 250 PS 637: Performed by: EMERGENCY MEDICINE

## 2022-05-02 RX ORDER — PANTOPRAZOLE SODIUM 40 MG/1
40 TABLET, DELAYED RELEASE ORAL
Status: DISCONTINUED | OUTPATIENT
Start: 2022-05-03 | End: 2022-05-06 | Stop reason: HOSPADM

## 2022-05-02 RX ORDER — ZIPRASIDONE HYDROCHLORIDE 20 MG/1
60 CAPSULE ORAL
Status: DISCONTINUED | OUTPATIENT
Start: 2022-05-03 | End: 2022-05-06 | Stop reason: HOSPADM

## 2022-05-02 RX ORDER — HYDROXYZINE HYDROCHLORIDE 50 MG/1
50 TABLET, FILM COATED ORAL 3 TIMES DAILY PRN
Status: DISCONTINUED | OUTPATIENT
Start: 2022-05-02 | End: 2022-05-06 | Stop reason: HOSPADM

## 2022-05-02 RX ORDER — CHOLECALCIFEROL (VITAMIN D3) 1250 MCG
1250 CAPSULE ORAL
Status: DISCONTINUED | OUTPATIENT
Start: 2022-05-08 | End: 2022-05-06 | Stop reason: HOSPADM

## 2022-05-02 RX ORDER — ONDANSETRON 4 MG/1
4 TABLET, ORALLY DISINTEGRATING ORAL EVERY 6 HOURS PRN
Status: DISCONTINUED | OUTPATIENT
Start: 2022-05-02 | End: 2022-05-06 | Stop reason: HOSPADM

## 2022-05-02 RX ORDER — ZIPRASIDONE HYDROCHLORIDE 20 MG/1
80 CAPSULE ORAL
Status: DISCONTINUED | OUTPATIENT
Start: 2022-05-02 | End: 2022-05-06 | Stop reason: HOSPADM

## 2022-05-02 RX ORDER — BISACODYL 10 MG
10 SUPPOSITORY, RECTAL RECTAL DAILY PRN
Status: DISCONTINUED | OUTPATIENT
Start: 2022-05-02 | End: 2022-05-06 | Stop reason: HOSPADM

## 2022-05-02 RX ORDER — GABAPENTIN 300 MG/1
1200 CAPSULE ORAL 3 TIMES DAILY
Status: DISCONTINUED | OUTPATIENT
Start: 2022-05-02 | End: 2022-05-03

## 2022-05-02 RX ADMIN — DOCUSATE SODIUM 100 MG: 100 CAPSULE, LIQUID FILLED ORAL at 08:01

## 2022-05-02 RX ADMIN — SERTRALINE HYDROCHLORIDE 50 MG: 50 TABLET ORAL at 08:02

## 2022-05-02 RX ADMIN — ZIPRASIDONE HCL 80 MG: 20 CAPSULE ORAL at 17:06

## 2022-05-02 RX ADMIN — ZIPRASIDONE HCL 60 MG: 20 CAPSULE ORAL at 08:01

## 2022-05-02 RX ADMIN — GABAPENTIN 1200 MG: 300 CAPSULE ORAL at 20:29

## 2022-05-02 RX ADMIN — BISACODYL 10 MG: 10 SUPPOSITORY RECTAL at 17:46

## 2022-05-02 RX ADMIN — SENNOSIDES 8.6 MG: 8.6 TABLET ORAL at 21:00

## 2022-05-02 RX ADMIN — MELATONIN TAB 3 MG 3 MG: 3 TAB at 01:55

## 2022-05-02 RX ADMIN — LAMOTRIGINE 200 MG: 200 TABLET ORAL at 20:29

## 2022-05-02 RX ADMIN — LEVOTHYROXINE SODIUM 50 MCG: 50 TABLET ORAL at 21:00

## 2022-05-02 RX ADMIN — NITROFURANTOIN (MONOHYDRATE/MACROCRYSTALS) 100 MG: 75; 25 CAPSULE ORAL at 08:01

## 2022-05-02 RX ADMIN — DIVALPROEX SODIUM 500 MG: 500 TABLET, FILM COATED, EXTENDED RELEASE ORAL at 08:01

## 2022-05-02 RX ADMIN — DIVALPROEX SODIUM 500 MG: 500 TABLET, FILM COATED, EXTENDED RELEASE ORAL at 20:29

## 2022-05-02 RX ADMIN — HYDROXYZINE HYDROCHLORIDE 50 MG: 50 TABLET, FILM COATED ORAL at 18:42

## 2022-05-02 RX ADMIN — TRAZODONE HYDROCHLORIDE 50 MG: 50 TABLET ORAL at 21:00

## 2022-05-02 RX ADMIN — HYDROXYZINE HYDROCHLORIDE 50 MG: 25 TABLET, FILM COATED ORAL at 01:55

## 2022-05-02 RX ADMIN — IBUPROFEN 800 MG: 200 TABLET, FILM COATED ORAL at 08:02

## 2022-05-02 RX ADMIN — DOCUSATE SODIUM 100 MG: 100 CAPSULE, LIQUID FILLED ORAL at 20:28

## 2022-05-02 RX ADMIN — NITROFURANTOIN (MONOHYDRATE/MACROCRYSTALS) 100 MG: 75; 25 CAPSULE ORAL at 20:28

## 2022-05-02 RX ADMIN — POLYETHYLENE GLYCOL 3350 17 G: 17 POWDER, FOR SOLUTION ORAL at 13:55

## 2022-05-02 RX ADMIN — LAMOTRIGINE 200 MG: 200 TABLET ORAL at 08:02

## 2022-05-02 RX ADMIN — FAMOTIDINE 40 MG: 20 TABLET ORAL at 08:02

## 2022-05-02 RX ADMIN — NICOTINE 1 PATCH: 21 PATCH, EXTENDED RELEASE TRANSDERMAL at 07:05

## 2022-05-02 ASSESSMENT — ACTIVITIES OF DAILY LIVING (ADL)
LAUNDRY: WITH SUPERVISION
HYGIENE/GROOMING: INDEPENDENT
DRESS: SCRUBS (BEHAVIORAL HEALTH)
HYGIENE/GROOMING: INDEPENDENT
ORAL_HYGIENE: INDEPENDENT
DRESS: SCRUBS (BEHAVIORAL HEALTH);INDEPENDENT
ORAL_HYGIENE: INDEPENDENT
LAUNDRY: WITH SUPERVISION

## 2022-05-02 NOTE — DISCHARGE INSTRUCTIONS
Behavioral Discharge Planning and Instructions    Summary: You were admitted on 4/29/2022  due to Depression, Psychotic Symptomology, and Suicidal Ideations.  You were treated by Debra Naegele APRN and discharged on 5/6/2022 from [facility] to Home    Main Diagnosis:   1.  Schizophrenia.  2.  General anxiety disorder.  3.  Fragile X syndrome.  4.  Posttraumatic stress disorder.  5.  Autism spectrum disorder.  6.  Developmental delay.  7.  Morbid obesity    Health Care Follow-up:   Primary Care Provider: Dr. Yoko Awad  [facility]   [address] [place] [address]  Phone: [phone]    Psychiatry appointment: Friday, May 27 at 9:30 am  Provider: Dr. Darshan He     Therapy appointment: Friday, May 13 at 4:40 pm  Provider: St. Martell Champagne.   Phone: [phone]    Day Treatment Program  Intake appointment: Thursday, May 12 at 9:30 am in clinic (2 hour appointment)  [facility]  [address] [place] [address]  Phone: [phone], Fax: [phone]     [facility] : Padmini Alexander, [phone]     Attend all scheduled appointments with your outpatient providers. Call at least 24 hours in advance if you need to reschedule an appointment to ensure continued access to your outpatient providers.     Major Treatments, Procedures and Findings:  You were provided with: a psychiatric assessment, assessed for medical stability, medication evaluation and/or management, group therapy, and milieu management    Symptoms to Report: feeling more aggressive, increased confusion, losing more sleep, mood getting worse, or thoughts of suicide    Early warning signs can include: increased depression or anxiety sleep disturbances increased thoughts or behaviors of suicide or self-harm  increased unusual thinking, such as paranoia or hearing voices    Safety and Wellness:  Take all medicines as directed.  Make no changes unless your doctor suggests them.   Follow  "treatment recommendations.  Refrain from alcohol and non-prescribed drugs.  If there is a concern for safety, call 911.    Resources:   Crisis Intervention: 564.545.6766 or 952-693-8530 (TTY: 484.832.4129).  Call anytime for help.  National [place] on Mental Illness (www.[place].pita.org): 150.562.5131 or 127-578-1492.  Suicide Awareness Voices of Education (SAVE) (www.save.org): 039-226-VXBS (0296)  National Suicide Prevention Line (www.mentalhealthmn.org): 133-139-ZFYK (7958)  Text 4 Life: txt \"LIFE\" to 65212 for immediate support and crisis intervention  [facility] mobile crisis team: Phone: (510) 952-3259    General Medication Instructions:   See your medication sheet(s) for instructions.   Take all medicines as directed.  Make no changes unless your doctor suggests them.   Go to all your doctor visits.  Be sure to have all your required lab tests. This way, your medicines can be refilled on time.  Do not use any drugs not prescribed by your doctor.  Avoid alcohol.    Advance Directives:   Scanned document on file with [employer]? No scanned doc  Is document scanned? Pt states no documents  Honoring Choices Your Rights Handout: Informed and given  Was more information offered? Pt declined    The Treatment team has appreciated the opportunity to work with you. If you have any questions or concerns about your recent admission, you can contact the unit which can receive your call 24 hours a day, 7 days a week. They will be able to get in touch with a Provider if needed. The unit number is [phone] .     "

## 2022-05-02 NOTE — PLAN OF CARE
05/02/22 1500   General Information   Has Not Attended OT as of: 05/02/22     Pt has not attended scheduled occupational therapy sessions. Encourage attendance and participation. Pt will be given self-assessment form, and OT staff will explain the purpose of including them in their treatment plan and offer options for meeting their needs.

## 2022-05-02 NOTE — CARE PLAN
Focus: Shift summary    Data: Patient slept 4.75 last night. Patient stated that she continues to have issues with constipation; was given Miralax 17g at 2352. Patient stated half an hour later that she felt it was ineffective; writer educated patient on Miramax usage and bowel regimen medications; patient stated understanding - needs reinforcement at this time. Patient also stated she is having difficulty getting to sleep - given hydroxyzine 50 mg @ 0155 and melatonin 3 mg; patient given medication education. Asleep after 1 hour on reassessment. Respirations even and unlabored on status 15 checks. Will continue to monitor and report to oncoming staff.    Response: Report sleep hours to day shift. Ask patient in the AM if she was able to have a bowel movement - patient asleep when writer came back to reevaluate. Continue to monitor patient and provide therapeutic interventions as necessary.

## 2022-05-02 NOTE — PLAN OF CARE
"  Problem: Plan of Care - These are the overarching goals to be used throughout the patient stay.    Goal: Plan of Care Review/Shift Note  Outcome: Ongoing, Progressing  Flowsheets (Taken 5/2/2022 0802)  Plan of Care Reviewed With: patient   Goal Outcome Evaluation:    Plan of Care Reviewed With: patient     Pt presented with a flat/blunted affect, endorsed anxiety 5/10 and headache rating 5/10, requested and was given PRN Ibuprofen, reports relief. Denied depression, SI, HI, hallucinations, and contracted for safety. Compliant with scheduled medications except Gabapentin at 0800 & 1400, pt reports \" I don't take Gabapentin during the day\". Did not morning group, went right back to room after breakfast and slept until lunch. Came out for lunch and returned right back to room, was getting ready to lay down when writer approached and encouraged her to come out and social with peers, agreed. Writer walked with pt to [facility] and found a peer with their [employer] playing cards, they welcomed pt to join them playing cards which she did. Played for a little then left and went back to room.    Pt reports no result from PRN Miralax given last night, a repeat Miralax given at 1355, please monitor for results.               "

## 2022-05-02 NOTE — PLAN OF CARE
05/01/22 2030    Group Therapy Session   Group Attendance attended group session   Time Session Began 0830   Time Session Ended 0900   Total Time patient participated (minutes) 30   Total # Attendees 4   Group Type psychotherapeutic;life skill   Group Topic Covered coping skills/lifestyle management;relaxation techniques;balanced lifestyle   Group Session Detail Coping Skill Exploration: Sleep hygiene. Strategies to improve sleep    Patient Response/Contribution discussed personal experience with topic;cooperative with task;contributed to discussions around additional information on the topic.   Patient Response Detail Pt participated in the group by reporting pt's name. Identified strategies that are helpful with sleep. Offered and received positive feedback to group participants. Indicated interest in practicing strategies learned during group. Pt reported being unable to sleep at night while pt spends most of the day time sleeping. Offered strategies but pt reported her sleep problems have been consistent for several days. Was open to feedback.
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
## 2022-05-02 NOTE — PLAN OF CARE
Assessment/Intervention/Current Symtoms and Care Coordination    The patient's care was discussed with the treatment team and chart notes were reviewed.    Writer met with the patient and completed the psychosocial assessment and the team note.    Phone call with [facility]  Padmini Benjamin ([phone]) who called for an update which was provided. She emailed an JANINE and email communication with patient's mother and guardian giving permission to exchange information with [place]. Padmini reported that mother wants patient to go to [facility]. Writer reported that patient would need to be on a commitment and there are no plans to petition for a commitment.    Phone call with patient's mother Mckenna who was having issues faxing the guardianship papers to the unit. She was able to send a picture via email and writer forwarded it to [facility] Gus for validation.    Discharge Plan or Goal  Home with outpatient providers    Barriers to Discharge   Patient is newly admitted and evaluation is in process.    Referral Status  No referrals have been made.    Legal Status    Voluntary

## 2022-05-02 NOTE — PLAN OF CARE
"Problem: Activity and Energy Impairment (Anxiety Signs/Symptoms)  Goal: Optimized Energy Level (Anxiety Signs/Symptoms)  Outcome: Ongoing, Progressing  Flowsheets (Taken 5/2/2022 1736)  Mutually Determined Action Steps (Optimized Energy Level): grooms self without prompting  Intervention: Optimize Energy Level  Recent Flowsheet Documentation  Taken 5/2/2022 1614 by Marian Castrejon RN  Patient Performed Hygiene: dressed  Activity (Behavioral Health): activity encouraged, socializing encouraged, eating in dinning room, limiting time alone in room   Goal Outcome Evaluation:    Plan of Care Reviewed With: patient. Pt acknowledges this writer and agrees then fails to follow through with treatment plan and decreasing isolative behaviors in room. Pt states, \"I'm tired and just want to sleep.\" Pt woke for assessment at start of shift and again for dinner. Pt encouraged to eat in dinning room, retreive a  message which was on the activity board for her and to shower this shift. Pt is compliant with medications and vital signs.     Pt has denied SI, HI and SIB this shift. Pt denies hallucinations. Spoke with patient about utilizing sleep to avoid working through her emotions and triggers for her anxiety. Per the patient's father, who called to check on the patient's status and speak with the patient, states \"she has been sleeping all the time.\" He states, \"she gets up for 3-4 hours and then goes back to sleep. Then she's up during the night.\" Pt's father states this behavior has been ongoing for a year now. He further states \"if she doesn't want to do something she says she isn't feeling good.\"    Pt given PRN Hydroxyzine for an anxiety attack after taking a shower, stating \"the padding in my bra is folded up and I can't fix it. I have a sensory problem.\"  Pt yelled and repeatedly stated \"I need help.\" VS remained stable, pt reassured and escorted to her room. Pt able to calm down with guided deep breathing. Pt states, \"I " "don't feel good and don't think I can do things tonight.\" Pt reassured and informed I would continue to monitor her and re-evaluate later. Pt expressed acceptance.    Pt took her night time medications in her room with a snack. States she is feeling better. Anxiety is resolved. Denies all c/o pain. States she has had a small BM since receiving the suppository. Pt contracted for safety throughout the shift.     "
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
## 2022-05-03 PROCEDURE — G0177 OPPS/PHP; TRAIN & EDUC SERV: HCPCS

## 2022-05-03 PROCEDURE — 124N000002 HC R&B MH UMMC

## 2022-05-03 PROCEDURE — 250N000013 HC RX MED GY IP 250 OP 250 PS 637: Performed by: PSYCHIATRY & NEUROLOGY

## 2022-05-03 PROCEDURE — 250N000013 HC RX MED GY IP 250 OP 250 PS 637: Performed by: CLINICAL NURSE SPECIALIST

## 2022-05-03 PROCEDURE — 250N000013 HC RX MED GY IP 250 OP 250 PS 637: Performed by: EMERGENCY MEDICINE

## 2022-05-03 PROCEDURE — 99232 SBSQ HOSP IP/OBS MODERATE 35: CPT | Performed by: CLINICAL NURSE SPECIALIST

## 2022-05-03 RX ORDER — GABAPENTIN 300 MG/1
600 CAPSULE ORAL AT BEDTIME
Status: DISCONTINUED | OUTPATIENT
Start: 2022-05-03 | End: 2022-05-06 | Stop reason: HOSPADM

## 2022-05-03 RX ADMIN — NICOTINE 1 PATCH: 21 PATCH, EXTENDED RELEASE TRANSDERMAL at 08:23

## 2022-05-03 RX ADMIN — SERTRALINE HYDROCHLORIDE 50 MG: 50 TABLET ORAL at 08:24

## 2022-05-03 RX ADMIN — PANTOPRAZOLE SODIUM 40 MG: 40 TABLET, DELAYED RELEASE ORAL at 08:24

## 2022-05-03 RX ADMIN — DOCUSATE SODIUM 100 MG: 100 CAPSULE, LIQUID FILLED ORAL at 21:39

## 2022-05-03 RX ADMIN — TRAZODONE HYDROCHLORIDE 50 MG: 50 TABLET ORAL at 21:40

## 2022-05-03 RX ADMIN — LAMOTRIGINE 200 MG: 200 TABLET ORAL at 08:24

## 2022-05-03 RX ADMIN — FAMOTIDINE 40 MG: 20 TABLET ORAL at 08:25

## 2022-05-03 RX ADMIN — SENNOSIDES 8.6 MG: 8.6 TABLET ORAL at 21:40

## 2022-05-03 RX ADMIN — GABAPENTIN 1200 MG: 300 CAPSULE ORAL at 08:24

## 2022-05-03 RX ADMIN — NITROFURANTOIN (MONOHYDRATE/MACROCRYSTALS) 100 MG: 75; 25 CAPSULE ORAL at 21:41

## 2022-05-03 RX ADMIN — DIVALPROEX SODIUM 500 MG: 500 TABLET, FILM COATED, EXTENDED RELEASE ORAL at 08:24

## 2022-05-03 RX ADMIN — LEVOTHYROXINE SODIUM 50 MCG: 50 TABLET ORAL at 21:41

## 2022-05-03 RX ADMIN — NITROFURANTOIN (MONOHYDRATE/MACROCRYSTALS) 100 MG: 75; 25 CAPSULE ORAL at 08:24

## 2022-05-03 RX ADMIN — ZIPRASIDONE HCL 60 MG: 20 CAPSULE ORAL at 08:24

## 2022-05-03 RX ADMIN — ZIPRASIDONE HCL 80 MG: 20 CAPSULE ORAL at 17:07

## 2022-05-03 RX ADMIN — CALCIUM CARBONATE (ANTACID) CHEW TAB 500 MG 500 MG: 500 CHEW TAB at 06:05

## 2022-05-03 RX ADMIN — DOCUSATE SODIUM 100 MG: 100 CAPSULE, LIQUID FILLED ORAL at 08:24

## 2022-05-03 RX ADMIN — GABAPENTIN 600 MG: 300 CAPSULE ORAL at 21:40

## 2022-05-03 RX ADMIN — DIVALPROEX SODIUM 500 MG: 500 TABLET, FILM COATED, EXTENDED RELEASE ORAL at 21:40

## 2022-05-03 RX ADMIN — LAMOTRIGINE 200 MG: 200 TABLET ORAL at 21:40

## 2022-05-03 ASSESSMENT — ACTIVITIES OF DAILY LIVING (ADL)
ORAL_HYGIENE: INDEPENDENT
DRESS: INDEPENDENT
HYGIENE/GROOMING: INDEPENDENT
LAUNDRY: WITH SUPERVISION

## 2022-05-03 NOTE — PLAN OF CARE
"   05/03/22 1543   Group Therapy Session   Group Attendance attended group session   Time Session Began 0230   Time Session Ended 0315   Total Time patient participated (minutes) 25   Total # Attendees 3   Group Type psychoeducation   Group Topic Covered coping skills/lifestyle management;balanced lifestyle;self-care activities;emotions/expression   Group Session Detail Coping Skill Exploration: Journaling   Patient Response/Contribution cooperative with task;organized   Patient Response Detail Pt was engaged for the first half of group. Creative and organized in her task approach. Left early due to \"wanting a mask break\". Pt was appreciative of group.       "

## 2022-05-03 NOTE — PLAN OF CARE
"  Problem: Suicide Risk  Goal: Absence of Self-Harm  Outcome: Ongoing, Progressing   Goal Outcome Evaluation:    Plan of Care Reviewed With: patient      Pt was visible at the beginning of the shift. Ate 100% breakfast and attended the first unit activity. Pt reported feeling tired and went back to bed at around 1120. Slept through lunch and got back up at 1400. Missed lunch but ate 100% boxed meal. She was social with select peers and staff. Was calm, pleasant, and cooperative. Complained that her meds were making her feel high, tired and sleepy. Provider was updated. Pt refused to take her afternoon Gabapentin dose stating that she was still feeling tired. Rated her depression at 2/10. Denied all SI, SIB, hallucinations, and anxiety. No verbal outbursts and no safety issues noted. Constipation issues resolved, pt reports she had a large BM. Labs ordered for tomorrow morning. Will continue to monitor.     4052-1191:    Dad called for an updated. Gabapentin orders changed from 1200 mg TID to 600 mg at bedtime. Pt was visible on the unit, social with both peers and staff. Attended and participated in evening group activities. Ate 100% dinner. Was pleasant, cooperative, and med compliant. Denied all psych symptoms. Went to bed at around 2100. No verbal outbursts and no behavioral issues noted.  Will continue to monitor.          /72 (BP Location: Left arm, Patient Position: Sitting, Cuff Size: Adult Large)   Pulse 88   Temp 97.1  F (36.2  C) (Temporal)   Resp 18   Ht 1.676 m (5' 6\")   Wt (!) 158.1 kg (348 lb 9.6 oz)   SpO2 98%   BMI 56.27 kg/m      "
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
## 2022-05-03 NOTE — CARE PLAN
Focus: Shift summary    Data: Patient slept 2 hours last night. Patient spent the majority of the shift in the dining lounge coloring and drawing; was able to lay down for 2 hours. However, once awakened patient was unable to get back to sleep and spent from 0500 listening to headphones and drawing again in dining lounge. Patient received calcium carbonate 500 mg at 0605 for heartburn; no other interventions needed at this time. Respirations even and unlabored on status 15 checks. Will continue to monitor and report to oncoming staff.    Response: Report sleep hours to day shift. Continue to monitor patient and provide therapeutic interventions as necessary.

## 2022-05-03 NOTE — PLAN OF CARE
05/03/22 1138   Group Therapy Session   Group Attendance attended group session   Time Session Began 1015   Time Session Ended 1110   Total Time patient participated (minutes) 45   Total # Attendees 5   Group Type expressive therapy;task skill   Group Topic Covered coping skills/lifestyle management;problem-solving;relaxation techniques;emotions/expression;structured socialization;leisure exploration/use of leisure time   Group Session Detail OT Clinic   Patient Response/Contribution cooperative with task;organized   Patient Response Detail At the start of group, pt presentd irritable that group was occuring in the room she was reading in. With time, pt explored the supplies/task opportunities available and quickly switched moods. Pt self-selected two painting projects to decorate her home with. She was organized and receptive to ideas offered to her. she remained social with staff and peers and brightened throughout group. Towards the end of group pt was irritable again - though for reasons related to mask wearing.

## 2022-05-04 LAB
ALBUMIN SERPL-MCNC: 3.4 G/DL (ref 3.4–5)
ALP SERPL-CCNC: 52 U/L (ref 40–150)
ALT SERPL W P-5'-P-CCNC: 31 U/L (ref 0–50)
ANION GAP SERPL CALCULATED.3IONS-SCNC: 7 MMOL/L (ref 3–14)
AST SERPL W P-5'-P-CCNC: 17 U/L (ref 0–45)
BASOPHILS # BLD AUTO: 0.1 10E3/UL (ref 0–0.2)
BASOPHILS NFR BLD AUTO: 1 %
BILIRUB SERPL-MCNC: 0.4 MG/DL (ref 0.2–1.3)
BUN SERPL-MCNC: 12 MG/DL (ref 7–30)
CALCIUM SERPL-MCNC: 8.9 MG/DL (ref 8.5–10.1)
CHLORIDE BLD-SCNC: 107 MMOL/L (ref 94–109)
CHOLEST SERPL-MCNC: 165 MG/DL
CO2 SERPL-SCNC: 25 MMOL/L (ref 20–32)
CREAT SERPL-MCNC: 0.88 MG/DL (ref 0.52–1.04)
EOSINOPHIL # BLD AUTO: 0.2 10E3/UL (ref 0–0.7)
EOSINOPHIL NFR BLD AUTO: 2 %
ERYTHROCYTE [DISTWIDTH] IN BLOOD BY AUTOMATED COUNT: 12.2 % (ref 10–15)
GFR SERPL CREATININE-BSD FRML MDRD: >90 ML/MIN/1.73M2
GLUCOSE BLD-MCNC: 100 MG/DL (ref 70–99)
HCT VFR BLD AUTO: 45.4 % (ref 35–47)
HDLC SERPL-MCNC: 31 MG/DL
HGB BLD-MCNC: 15 G/DL (ref 11.7–15.7)
IMM GRANULOCYTES # BLD: 0 10E3/UL
IMM GRANULOCYTES NFR BLD: 0 %
LDLC SERPL CALC-MCNC: 87 MG/DL
LYMPHOCYTES # BLD AUTO: 3.9 10E3/UL (ref 0.8–5.3)
LYMPHOCYTES NFR BLD AUTO: 48 %
MCH RBC QN AUTO: 29.3 PG (ref 26.5–33)
MCHC RBC AUTO-ENTMCNC: 33 G/DL (ref 31.5–36.5)
MCV RBC AUTO: 89 FL (ref 78–100)
MONOCYTES # BLD AUTO: 0.7 10E3/UL (ref 0–1.3)
MONOCYTES NFR BLD AUTO: 8 %
NEUTROPHILS # BLD AUTO: 3.3 10E3/UL (ref 1.6–8.3)
NEUTROPHILS NFR BLD AUTO: 41 %
NONHDLC SERPL-MCNC: 134 MG/DL
NRBC # BLD AUTO: 0 10E3/UL
NRBC BLD AUTO-RTO: 0 /100
PLATELET # BLD AUTO: 214 10E3/UL (ref 150–450)
POTASSIUM BLD-SCNC: 4.5 MMOL/L (ref 3.4–5.3)
PROT SERPL-MCNC: 6.5 G/DL (ref 6.8–8.8)
RBC # BLD AUTO: 5.12 10E6/UL (ref 3.8–5.2)
SODIUM SERPL-SCNC: 139 MMOL/L (ref 133–144)
TRIGL SERPL-MCNC: 234 MG/DL
TSH SERPL DL<=0.005 MIU/L-ACNC: 3.29 MU/L (ref 0.4–4)
VALPROATE SERPL-MCNC: 44 MG/L
WBC # BLD AUTO: 8.2 10E3/UL (ref 4–11)

## 2022-05-04 PROCEDURE — 80053 COMPREHEN METABOLIC PANEL: CPT | Performed by: CLINICAL NURSE SPECIALIST

## 2022-05-04 PROCEDURE — 250N000013 HC RX MED GY IP 250 OP 250 PS 637: Performed by: CLINICAL NURSE SPECIALIST

## 2022-05-04 PROCEDURE — 85048 AUTOMATED LEUKOCYTE COUNT: CPT | Performed by: CLINICAL NURSE SPECIALIST

## 2022-05-04 PROCEDURE — 84443 ASSAY THYROID STIM HORMONE: CPT | Performed by: CLINICAL NURSE SPECIALIST

## 2022-05-04 PROCEDURE — 124N000002 HC R&B MH UMMC

## 2022-05-04 PROCEDURE — 250N000013 HC RX MED GY IP 250 OP 250 PS 637: Performed by: EMERGENCY MEDICINE

## 2022-05-04 PROCEDURE — G0177 OPPS/PHP; TRAIN & EDUC SERV: HCPCS

## 2022-05-04 PROCEDURE — 99232 SBSQ HOSP IP/OBS MODERATE 35: CPT | Performed by: CLINICAL NURSE SPECIALIST

## 2022-05-04 PROCEDURE — 80061 LIPID PANEL: CPT | Performed by: CLINICAL NURSE SPECIALIST

## 2022-05-04 PROCEDURE — 80164 ASSAY DIPROPYLACETIC ACD TOT: CPT | Performed by: CLINICAL NURSE SPECIALIST

## 2022-05-04 PROCEDURE — 250N000013 HC RX MED GY IP 250 OP 250 PS 637: Performed by: PSYCHIATRY & NEUROLOGY

## 2022-05-04 PROCEDURE — 36415 COLL VENOUS BLD VENIPUNCTURE: CPT | Performed by: CLINICAL NURSE SPECIALIST

## 2022-05-04 RX ADMIN — HYDROXYZINE HYDROCHLORIDE 50 MG: 50 TABLET, FILM COATED ORAL at 02:25

## 2022-05-04 RX ADMIN — PANTOPRAZOLE SODIUM 40 MG: 40 TABLET, DELAYED RELEASE ORAL at 06:50

## 2022-05-04 RX ADMIN — LEVOTHYROXINE SODIUM 50 MCG: 50 TABLET ORAL at 20:47

## 2022-05-04 RX ADMIN — DOCUSATE SODIUM 100 MG: 100 CAPSULE, LIQUID FILLED ORAL at 08:24

## 2022-05-04 RX ADMIN — CALCIUM CARBONATE (ANTACID) CHEW TAB 500 MG 500 MG: 500 CHEW TAB at 20:09

## 2022-05-04 RX ADMIN — LAMOTRIGINE 200 MG: 200 TABLET ORAL at 08:24

## 2022-05-04 RX ADMIN — LAMOTRIGINE 200 MG: 200 TABLET ORAL at 20:46

## 2022-05-04 RX ADMIN — ZIPRASIDONE HCL 80 MG: 20 CAPSULE ORAL at 17:30

## 2022-05-04 RX ADMIN — MELATONIN TAB 3 MG 3 MG: 3 TAB at 02:30

## 2022-05-04 RX ADMIN — NICOTINE 1 PATCH: 21 PATCH, EXTENDED RELEASE TRANSDERMAL at 08:24

## 2022-05-04 RX ADMIN — TRAZODONE HYDROCHLORIDE 50 MG: 50 TABLET ORAL at 20:46

## 2022-05-04 RX ADMIN — SENNOSIDES 8.6 MG: 8.6 TABLET ORAL at 20:47

## 2022-05-04 RX ADMIN — SERTRALINE HYDROCHLORIDE 50 MG: 50 TABLET ORAL at 08:24

## 2022-05-04 RX ADMIN — HYDROXYZINE HYDROCHLORIDE 50 MG: 50 TABLET, FILM COATED ORAL at 06:50

## 2022-05-04 RX ADMIN — DOCUSATE SODIUM 100 MG: 100 CAPSULE, LIQUID FILLED ORAL at 20:47

## 2022-05-04 RX ADMIN — ZIPRASIDONE HCL 60 MG: 20 CAPSULE ORAL at 08:24

## 2022-05-04 RX ADMIN — GABAPENTIN 600 MG: 300 CAPSULE ORAL at 20:47

## 2022-05-04 RX ADMIN — DIVALPROEX SODIUM 500 MG: 500 TABLET, FILM COATED, EXTENDED RELEASE ORAL at 08:24

## 2022-05-04 RX ADMIN — IBUPROFEN 800 MG: 200 TABLET, FILM COATED ORAL at 18:33

## 2022-05-04 RX ADMIN — DIVALPROEX SODIUM 500 MG: 500 TABLET, FILM COATED, EXTENDED RELEASE ORAL at 20:46

## 2022-05-04 RX ADMIN — FAMOTIDINE 40 MG: 20 TABLET ORAL at 08:24

## 2022-05-04 ASSESSMENT — ACTIVITIES OF DAILY LIVING (ADL)
HYGIENE/GROOMING: INDEPENDENT
DRESS: SCRUBS (BEHAVIORAL HEALTH)
LAUNDRY: WITH SUPERVISION
ORAL_HYGIENE: INDEPENDENT
HYGIENE/GROOMING: INDEPENDENT
ORAL_HYGIENE: INDEPENDENT
LAUNDRY: WITH SUPERVISION
DRESS: SCRUBS (BEHAVIORAL HEALTH);INDEPENDENT

## 2022-05-04 NOTE — PLAN OF CARE
Assessment/Intervention/Current Symtoms and Care Coordination    The patient's care was discussed with the treatment team and chart notes were reviewed.    Team note completed. AVS started.     Discharge Plan or Goal  Home with outpatient providers     Barriers to Discharge   Medication changes in process to stabilize symptoms     Referral Status  No referrals have been made.     Legal Status    Voluntary

## 2022-05-04 NOTE — PLAN OF CARE
"  Problem: Mood Impairment (Nonsuicidal Self-Injury)  Goal: Improved Mood Symptoms (Nonsuicidal Self-Injury)  Intervention: Optimize Emotion and Mood  Recent Flowsheet Documentation  Taken 5/4/2022 0900 by Pinky Goldman RN  Supportive Measures:    active listening utilized    goal-setting facilitated    positive reinforcement provided    self-care encouraged    self-responsibility promoted   Goal Outcome Evaluation:    Plan of Care Reviewed With: patient      Patient visible and active in Milieu with prompts, reports mood as \"tired and sad\", rates depression a 3/10 and anxiety 4/10, 10 being worst, denies any hallucinations and denies any SI/SIB thoughts; med compliant and denies any physical pain or medical concerns; patient reports feeling \"lonely and sad I didn't reach my grandpa on the first attempt,\" patient's biggest concern is that she will not have any visitors because her family/friends are too far away. Patient feels she is feeling better today and attributes that to the med change. Patient appetite good, yet not sleeping very well last night.     MH assessment and check in, encourage to stay awake during the day, patient agrees to get up again for groups this afternoon. Cooperative and not aggressive.  Patient reading, listening to music, attending groups and calling family is helpful.                    " Assessment/Plan: Morbid obesity with body mass index (BMI) of 40 0 to 49 9 ([facility] [address] )  Interested in Margarita-En-Y Gastric Bypass with Dr Rick Camarena  10% Weight loss-Not Applicable  Screening labs-Not Completed  Check coverage of vitamin labs before having them drawn  Support Group- Completed 4/18/18  Cardiac Risk Assessment-Not Completed  Upper Endoscopy-Not Completed; scheduled for 6/13/18  Sleep Medicine Assessment-Negative STOP  BANG  Alcohol and nicotine use is not recommended following bariatric surgery  NSAIDs should be avoided following bariatric surgery  Advised that pregnancy should be avoided following bariatric surgery for 12-18 months and should not solely rely on OCP and consider additional/alternative sources for contraception due to potential absorption issues-post menopausal    Benign essential hypertension  -stable  -taking losartan    Follow up in approximately 1 month with Surgical Dietician  Reviewed the importance of following the lessons in the manual and the dietary, behavioral, and exercise changes for long-term success following bariatric surgery  Goals:  Food log (ie ) www myfitnesspal com,sparkpeople  com,loseit com,calorieking  com,etc    No sugary beverages  At least 64oz of water daily  Increase physical activity by 10 minutes daily Gradually increase physical activity to a goal of 5 days per week for 30 minutes of MODERATE intensity PLUS 2 days per week of FULL BODY resistance training  Practice lesson plans 1-6 in bariatric manual   Practice 30/60 rule  Please have labs done     Diagnoses and all orders for this visit:    Morbid obesity with body mass index (BMI) of 40 0 to 49 9 ([facility])    Benign essential hypertension    Subjective:   Chief Complaint   Patient presents with    Weight Check     Patient here for 3/3 Jimbo-Weight Check  Patient ID: Ian Ruiz  is a 63 y o  female with excess weight/obesity here to pursue weight managment    Patient is pursuing Margarita-En-Y Gastric Bypass  HPI  The patient presents for 3/4 weight check  The patient has been:  Following the lessons in the manual- yes  Practicing the 30/60 minute rule- yes  Food logging- yes  Exercise- no    The following portions of the patient's history were reviewed and updated as appropriate: allergies, current medications, past family history, past medical history, past social history, past surgical history and problem list     Review of Systems   Respiratory: Negative for cough and shortness of breath  Cardiovascular: Negative for chest pain and palpitations  Gastrointestinal: Negative for abdominal pain, constipation, diarrhea, nausea and vomiting  Psychiatric/Behavioral:        Denies sx of depression     Objective:    /86 (BP Location: Left arm, Patient Position: Sitting, Cuff Size: Large)   Pulse 91   Temp 98 6 °F (37 °C) (Tympanic)   Resp 17   Ht 5' 3 5" (1 613 m)   Wt 129 kg (283 lb 4 8 oz)   BMI 49 40 kg/m²      Physical Exam   Nursing note and vitals reviewed  Constitutional   General appearance: Abnormal   well developed and morbidly obese  Eyes No conjunctival pallor  Ears, Nose, Mouth, and Throat Oral mucosa moist    Pulmonary   Respiratory effort: No increased work of breathing or signs of respiratory distress  Auscultation of lungs: Clear to auscultation, equal breath sounds bilaterally, no wheezes, no rales, no rhonci  Cardiovascular   Auscultation of heart: Borderline tachycardic, Normal rhythm, normal S1 and S2, without murmurs  Examination of extremities for edema and/or varicosities: + nonpitting edema  Abdomen   Abdomen: Abnormal   The abdomen was obese  Bowel sounds were normal  The abdomen was soft and nontender     Musculoskeletal   Gait and station: ambulates with cane   Psychiatric   Orientation to person, place and time: Normal     Affect: appropriate

## 2022-05-04 NOTE — PLAN OF CARE
05/04/22 1501   Individualization/Patient Specific Goals   Patient Personal Strengths family/social support;socioeconomic stability;stable living environment   Patient Vulnerabilities lacks insight into illness;limited social skills;limited support system;occupational insecurity;poor impulse control   Interprofessional Rounds   Participants advanced practice nurse;CTC;nursing;OT   Team Discussion   Participants Debra Naegele APRN, Frances MARCUM, Pinky Goldman RN, Kadi Boone OT   Progress Improving   Anticipated length of stay 3-5 days   Continued Stay Criteria/Rationale Suicidal ideation   Medical/Physical No acute medical concerns   Plan Medication managment, group support   Rationale for change in precautions or plan Initial plan   Safety Plan Safe secure milieu   Anticipated Discharge Disposition home with family   PRECAUTIONS AND SAFETY    Behavioral Orders   Procedures    Code 1 - Restrict to Unit    Discontinue 1:1 attendant for suicide risk     Order Specific Question:   I have performed an in person assessment of the patient     Answer:   Based on this assessment the patient no longer requires a one on one attendant at this point in time.    Fall precautions    Routine Programming     As clinically indicated    Status 15     Every 15 minutes.    Suicide precautions     Patients on Suicide Precautions should have a Combination Diet ordered that includes a Diet selection(s) AND a Behavioral Tray selection for Safe Tray - with utensils, or Safe Tray - NO utensils         Safety  Safety WDL: WDL  Patient Location: patient room, own  Observed Behavior: calm, sitting  Observed Behavior (Comment): reading  Safety Measures: environmental rounds completed, safety rounds completed, suicide assessment completed  Suicidality: Status 15, Unpredictable frequency of checking on patient, Perform mouth checks after medication administration to prevent hoarding, Optimize communication / relationship to minimize  opportunity for self-harm, Behavioral scrubs (pajamas), Minimal furniture in room, Minimal personal belongings in room
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
## 2022-05-04 NOTE — PLAN OF CARE
Problem: Sleep Impairment (Anxiety Signs/Symptoms)  Goal: Improved Sleep (Anxiety Signs/Symptoms)  Outcome: Ongoing, Progressing   Goal Outcome Evaluation:    The patient slept for 6 hours. The patient's respiratory pattern appeared to be normal, and there were no safety concerns. There was no relevant incident while the patient was on suicide and fall watch/precautions. During the night, the patient was not given any medicine, and there was no c/o pain. The pt has been NPO since MN for fasting AM labs. When Pt sought a snack and it was not provided to her, she was effectively redirected. For problems falling asleep again, the patient was given Vistaril and melatonin on an as-needed basis. Pt insisted on having a cup of black coffee and she did.

## 2022-05-04 NOTE — PLAN OF CARE
05/04/22 1453   Group Therapy Session   Group Attendance attended group session   Time Session Began 1115   Time Session Ended 1205   Total Time patient participated (minutes) 45   Total # Attendees 5   Group Type expressive therapy;task skill   Group Topic Covered coping skills/lifestyle management;balanced lifestyle   Group Session Detail OT clinic   Patient Response/Contribution cooperative with task;organized   Patient Response Detail Pt self-selected a beading task, following the lead of another peer. She was organized, attentive to detail. Social with peers. Appeared to be in a calm mood.

## 2022-05-05 PROCEDURE — 99232 SBSQ HOSP IP/OBS MODERATE 35: CPT | Performed by: CLINICAL NURSE SPECIALIST

## 2022-05-05 PROCEDURE — 250N000013 HC RX MED GY IP 250 OP 250 PS 637: Performed by: EMERGENCY MEDICINE

## 2022-05-05 PROCEDURE — 250N000013 HC RX MED GY IP 250 OP 250 PS 637: Performed by: PSYCHIATRY & NEUROLOGY

## 2022-05-05 PROCEDURE — 124N000002 HC R&B MH UMMC

## 2022-05-05 PROCEDURE — 250N000013 HC RX MED GY IP 250 OP 250 PS 637: Performed by: CLINICAL NURSE SPECIALIST

## 2022-05-05 RX ADMIN — DOCUSATE SODIUM 100 MG: 100 CAPSULE, LIQUID FILLED ORAL at 21:44

## 2022-05-05 RX ADMIN — TRAZODONE HYDROCHLORIDE 50 MG: 50 TABLET ORAL at 21:44

## 2022-05-05 RX ADMIN — POLYETHYLENE GLYCOL 3350 17 G: 17 POWDER, FOR SOLUTION ORAL at 19:08

## 2022-05-05 RX ADMIN — NICOTINE 1 PATCH: 21 PATCH, EXTENDED RELEASE TRANSDERMAL at 08:36

## 2022-05-05 RX ADMIN — PANTOPRAZOLE SODIUM 40 MG: 40 TABLET, DELAYED RELEASE ORAL at 07:26

## 2022-05-05 RX ADMIN — FAMOTIDINE 40 MG: 20 TABLET ORAL at 08:35

## 2022-05-05 RX ADMIN — MELATONIN TAB 3 MG 3 MG: 3 TAB at 02:20

## 2022-05-05 RX ADMIN — LEVOTHYROXINE SODIUM 50 MCG: 50 TABLET ORAL at 21:44

## 2022-05-05 RX ADMIN — DOCUSATE SODIUM 100 MG: 100 CAPSULE, LIQUID FILLED ORAL at 08:36

## 2022-05-05 RX ADMIN — DIVALPROEX SODIUM 500 MG: 500 TABLET, FILM COATED, EXTENDED RELEASE ORAL at 21:44

## 2022-05-05 RX ADMIN — HYDROXYZINE HYDROCHLORIDE 50 MG: 50 TABLET, FILM COATED ORAL at 02:20

## 2022-05-05 RX ADMIN — ZIPRASIDONE HCL 80 MG: 20 CAPSULE ORAL at 18:53

## 2022-05-05 RX ADMIN — SERTRALINE HYDROCHLORIDE 50 MG: 50 TABLET ORAL at 08:36

## 2022-05-05 RX ADMIN — LAMOTRIGINE 200 MG: 200 TABLET ORAL at 21:44

## 2022-05-05 RX ADMIN — DIVALPROEX SODIUM 500 MG: 500 TABLET, FILM COATED, EXTENDED RELEASE ORAL at 08:35

## 2022-05-05 RX ADMIN — SENNOSIDES 8.6 MG: 8.6 TABLET ORAL at 21:44

## 2022-05-05 RX ADMIN — LAMOTRIGINE 200 MG: 200 TABLET ORAL at 08:36

## 2022-05-05 RX ADMIN — ZIPRASIDONE HCL 60 MG: 20 CAPSULE ORAL at 08:35

## 2022-05-05 RX ADMIN — GABAPENTIN 600 MG: 300 CAPSULE ORAL at 22:41

## 2022-05-05 ASSESSMENT — ACTIVITIES OF DAILY LIVING (ADL)
HYGIENE/GROOMING: INDEPENDENT;SHOWER
LAUNDRY: WITH SUPERVISION
DRESS: SCRUBS (BEHAVIORAL HEALTH);INDEPENDENT
HYGIENE/GROOMING: SHOWER;INDEPENDENT
LAUNDRY: WITH SUPERVISION
ORAL_HYGIENE: INDEPENDENT
ORAL_HYGIENE: INDEPENDENT
DRESS: INDEPENDENT

## 2022-05-05 NOTE — PLAN OF CARE
"  Problem: Behavior Regulation Impairment (Nonsuicidal Self-Injury)  Goal: Improved Behavior Regulation and Impulse Control (Nonsuicidal Self-Injury)  Outcome: Ongoing, Progressing   Goal Outcome Evaluation:    Plan of Care Reviewed With: patient     Patient up early reports mood as \"upset, depressed and lonely\", blunt, child like voice, rates depression 5/10 and anxiety 6/10, \"I want to go home. I am angry that I cannot do keto; patient reports she is feeling and sleeping better on her medications and anxious to see provider today. Patient reports goal today is to \"stay awake.\" Patient denies any SI/SIB, hallucinations.    Med compliant, denies side effects or physical pain, patient reports stubbing her right second toe and nailbed removed on own.  VSS     /64   Pulse 91   Temp 97.1  F (36.2  C) (Temporal)   Resp 16   Ht 1.676 m (5' 6\")   Wt (!) 157.9 kg (348 lb 1.6 oz)   SpO2 96%   BMI 56.18 kg/m    Patient reports concern for her having body odor. Patient reports she normally showers at night, but forgot last night.    MH assessment, 1:1 check in, Writer inspects second toe of right foot and no bleeding, recommend shower and change of clothes. Patient needs a lot of positive feedback and reassurance, reminders to approach staff with needs.  Nutrition Consult    Patient changes clothes, and reports goal to shower after breakfast; patient in lounge reading and able to verbalize distress tolerance skills; patient states her asthma prevents her from pacing or running, but will punch a pillow if needed instead of throwing or braking things. Patient able to process her frustrations earlier today with writer and now appears more confident and goal oriented.                  "

## 2022-05-05 NOTE — PLAN OF CARE
Assessment/Intervention/Current Symtoms and Care Coordination    The patient's care was discussed with the treatment team and chart notes were reviewed.    LVM with patient's mother and guardian to inquire about changing psychiatrist per patient request. Informed mom that provider does not recommend doing EMDR at this time.    Phone call with patient's mom josefina, who reported that she would also like a new psychiatrist and she recommended Dr. Darshan He whose specialty is schizophrenia. She asked writer to seek an appointment. Writer call two different clinics who reported that the provider no longer worked there and they had no forwarding information. Writer contacted Josefina who reported she has his cell phone number from her job and will attempt to schedule appointment.     Discharge Plan or Goal  Home with outpatient providers     Barriers to Discharge   Medication changes in process to stabilize symptoms     Referral Status  No referrals have been made.     Legal Status    Voluntary
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
## 2022-05-05 NOTE — PROGRESS NOTES
"Pt woke up at 0215 c/o insomnia and anxiety 'I can't sleep\".  RN asked pt why she is anxious pt reported \" I don't know\".  Pt asked for Melatonin.  Prn Hydroxyzine 50 mg and melatonin 3 mg administered at 0220. Pt also got a soft care mattress due to complaints \"my back is so sore, I can't even go to sleep\".  Pt went back to sleep without any concerns.  Will continue to monitor.  "

## 2022-05-05 NOTE — PROGRESS NOTES
"CLINICAL NUTRITION SERVICES - BRIEF NOTE     Nutrition Prescription    Recommendations already ordered by Registered Dietitian (RD):  Allow write in entree of Chicken Caesar salad.       REASON FOR ASSESSMENT  Yasmine Smith is a/an 21 year old female assessed by the dietitian for Patient/Family Request -- Patient is requesting salads and unable to order on menu, requests low carb entrees if able to speak to on phone today.    PMH  Yasmine Smith is a 21 year old female with hx of schizophrenia, ASD, anxiety, ptsd, bipolar 2, fragile X, presents to the ED with her father due to si.      EVALUATION OF THE PROGRESS TOWARD GOALS   Diet: Regular  Intake: good, 100% supper and snacks on 5/4     NEW FINDINGS   Spoke to provider this morning. Patient is unhappy with the high CHO entree options. Would like \"healthier\" alternatives.     Patient was resting at the time of RDN visit, so visit was brief. Patient reports wanting to do \"keto diet.\" When asked if she knew what keto diet meant, she reports \"low carb.\" Patient reports following this diet for a few months. Noted that patient is not on medical keto diet and rather a lower CHO diet.     INTERVENTIONS  Allow write in entree of Chicken Caesar salad.    Monitoring/Evaluation  Progress toward goals will be monitored and evaluated per protocol.     Karine Gutierrez, MPH, RDN, LD  Behavioral Clinical Dietitian  Mental Health Pager (M-F): [phone]  On Call Pager (weekends only): [phone]  "

## 2022-05-05 NOTE — PROGRESS NOTES
Pt appeared to have slept for 5.75 hours.  Breathing is even and unlabored.  No medical/safety concerns this shift.  Pt remain on suicide and fall precautions.

## 2022-05-06 VITALS
HEART RATE: 102 BPM | SYSTOLIC BLOOD PRESSURE: 124 MMHG | RESPIRATION RATE: 16 BRPM | HEIGHT: 66 IN | WEIGHT: 293 LBS | OXYGEN SATURATION: 95 % | TEMPERATURE: 97 F | BODY MASS INDEX: 47.09 KG/M2 | DIASTOLIC BLOOD PRESSURE: 88 MMHG

## 2022-05-06 PROCEDURE — 250N000013 HC RX MED GY IP 250 OP 250 PS 637: Performed by: CLINICAL NURSE SPECIALIST

## 2022-05-06 PROCEDURE — 93005 ELECTROCARDIOGRAM TRACING: CPT

## 2022-05-06 PROCEDURE — 99239 HOSP IP/OBS DSCHRG MGMT >30: CPT | Performed by: CLINICAL NURSE SPECIALIST

## 2022-05-06 PROCEDURE — 250N000013 HC RX MED GY IP 250 OP 250 PS 637: Performed by: PSYCHIATRY & NEUROLOGY

## 2022-05-06 PROCEDURE — 250N000013 HC RX MED GY IP 250 OP 250 PS 637: Performed by: EMERGENCY MEDICINE

## 2022-05-06 RX ORDER — PANTOPRAZOLE SODIUM 40 MG/1
40 TABLET, DELAYED RELEASE ORAL DAILY
COMMUNITY
End: 2022-07-23

## 2022-05-06 RX ORDER — ZIPRASIDONE HYDROCHLORIDE 60 MG/1
60 CAPSULE ORAL
Qty: 30 CAPSULE | Refills: 1 | Status: SHIPPED | OUTPATIENT
Start: 2022-05-07 | End: 2022-07-23

## 2022-05-06 RX ORDER — ZIPRASIDONE HYDROCHLORIDE 80 MG/1
80 CAPSULE ORAL
Qty: 30 CAPSULE | Refills: 1 | Status: SHIPPED | OUTPATIENT
Start: 2022-05-06

## 2022-05-06 RX ORDER — LANOLIN ALCOHOL/MO/W.PET/CERES
3 CREAM (GRAM) TOPICAL
Qty: 30 TABLET | Refills: 1 | Status: SHIPPED | OUTPATIENT
Start: 2022-05-06 | End: 2023-05-25

## 2022-05-06 RX ADMIN — LAMOTRIGINE 200 MG: 200 TABLET ORAL at 08:29

## 2022-05-06 RX ADMIN — DIVALPROEX SODIUM 500 MG: 500 TABLET, FILM COATED, EXTENDED RELEASE ORAL at 08:29

## 2022-05-06 RX ADMIN — SERTRALINE HYDROCHLORIDE 50 MG: 50 TABLET ORAL at 08:29

## 2022-05-06 RX ADMIN — GABAPENTIN 600 MG: 300 CAPSULE ORAL at 11:27

## 2022-05-06 RX ADMIN — PANTOPRAZOLE SODIUM 40 MG: 40 TABLET, DELAYED RELEASE ORAL at 08:00

## 2022-05-06 RX ADMIN — DOCUSATE SODIUM 100 MG: 100 CAPSULE, LIQUID FILLED ORAL at 08:00

## 2022-05-06 RX ADMIN — FAMOTIDINE 40 MG: 20 TABLET ORAL at 08:29

## 2022-05-06 RX ADMIN — NICOTINE 1 PATCH: 21 PATCH, EXTENDED RELEASE TRANSDERMAL at 08:05

## 2022-05-06 RX ADMIN — ZIPRASIDONE HCL 60 MG: 20 CAPSULE ORAL at 08:29

## 2022-05-06 NOTE — PLAN OF CARE
Assessment/Intervention/Current Symtoms and Care Coordination    The patient's care was discussed with the treatment team and chart notes were reviewed.    Provider reported that mother was able to schedule appointment with new psychiatrist. LVM with patient's mother and guardian to inquire about psychiatry appointment that she scheduled. Requested call back.    Writer scheduled intake appointment for day treatment per mother's request.    Patient will discharge today at 1500. Mother will . AVS completed.    Phone call with mom to discuss discharge plans. Mother (guardian) gave permission for JANINE for [facility]. Signed off by [facility]'s.    Writer faxed H&P to [facility]. ([phone])     Discharge Plan or Goal  Home with outpatient providers     Barriers to Discharge   None     Referral Status  Patient was referred for day treatment at [facility]. Intake appointment was scheduled.     Legal Status    Voluntary

## 2022-05-06 NOTE — PLAN OF CARE
"  Problem: Behavior Regulation Impairment (Nonsuicidal Self-Injury)  Goal: Improved Behavior Regulation and Impulse Control (Nonsuicidal Self-Injury)  Intervention: Promote Behavior and Impulse Control  Recent Flowsheet Documentation  Taken 5/5/2022 1918 by Silvia Ashton RN  Behavior Management:    boundaries reinforced    impulse control promoted   Goal Outcome Evaluation:    Plan of Care Reviewed With: patient      Pt slept in until 1750 when she was woken up for dinner. Consumed 100% of the meal provided. Pt complained of constipation. Expressed that the last time she had a bowel movement was 2-3 days ago. Mralax was provided after dinner. Described her mood as being pretty good. Right toenail is covered with a bandaid, no drainage or bleeding noted from the site. Pt denied discomfort from the site. Denied SI,SIB,HI and Hallucinations.     Rated her depression and anxiety as 0/10. Stated, \"I don't have anxiety right now. I was depressed earlier today because I was lonely but I'm ok now\". Pt admitted that she sleeps more during the day than at night, \"My mom thinks I'm narcoleptic\". Pt's goal is to sleep through the night.     Pt was encouraged to take a shower after dinner. Pt cooperated and took a shower at . Pt explained to staff that she observed a hardened area on the right inner groin area. Pt indicated that the site is painful to touch. Site was assessed by 2 nurses. Site appeared slightly reddened and hard, measured about 2 centimeters. Will request for a follow up by the medical team in the morning. Pt has been using reading as part of her coping skills. Patient refused to take her Gabapentin at , stated, \"I'm not in paint tonight\".  Will continue to assess pt's status and provide support.              "

## 2022-05-06 NOTE — PROGRESS NOTES
Pt is visible in the milieu, socially appropriate with peers and staff. Denies SI/SIB AH or VH.     Compliant with scheduled meds, denies all side effects.    Reports good sleep and appetite.    EKG Completed on the unit, provider notified.    PRN Gabapentin administered per pt's request for toe pain 5/10.Pt states medication is effective.      Pt is discharged to home via private car (mom)  Discharge summary reviewed. Outpatient crisis resources identified. Verbalizes understanding of all discharge instructions, recommendations and medications. All legal documents signed by mom (Legal guardian).
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
## 2022-05-06 NOTE — PROGRESS NOTES
Pt appeared to have slept for 5.75 hours.  Breathing is even and unlabored.  No medical/safety concerns this shift.    No prn administered.  Pt remain on suicide and fall precautions.

## 2022-05-07 ENCOUNTER — PATIENT OUTREACH (OUTPATIENT)
Dept: CARE COORDINATION | Facility: CLINIC | Age: 22
End: 2022-05-07
Payer: MEDICAID

## 2022-05-07 DIAGNOSIS — Z71.89 OTHER SPECIFIED COUNSELING: ICD-10-CM

## 2022-05-07 NOTE — PROGRESS NOTES
Clinic Care Coordination Contact  [facility]/Voicemail       Clinical Data: Care Coordinator Outreach  Outreach attempted x 1.  Left message on patient's voicemail with call back information and requested return call.  Plan: Care Coordinator will try to reach patient again in 1-2 business days.    CLOVER Rico  Connected Care Resource Center  [facility]     *Connected Care Resource Team does NOT follow patient ongoing. Referrals are identified based on internal discharge reports and the outreach is to ensure patient has an understanding of their discharge instructions.

## 2022-05-08 NOTE — PROGRESS NOTES
Clinic Care Coordination Contact  [facility]/Voicemail       Clinical Data: Care Coordinator Outreach  Outreach attempted x 2.  Left message on patient's voicemail with call back information and requested return call.    Plan: Care Coordinator will do no further outreaches at this time.    CLOVER Rico  Connected Care Resource Center  [facility]     *Connected Care Resource Team does NOT follow patient ongoing. Referrals are identified based on internal discharge reports and the outreach is to ensure patient has an understanding of their discharge instructions.

## 2022-05-11 LAB
ATRIAL RATE - MUSE: 80 BPM
DIASTOLIC BLOOD PRESSURE - MUSE: NORMAL MMHG
INTERPRETATION ECG - MUSE: NORMAL
P AXIS - MUSE: 40 DEGREES
PR INTERVAL - MUSE: 172 MS
QRS DURATION - MUSE: 100 MS
QT - MUSE: 374 MS
QTC - MUSE: 431 MS
R AXIS - MUSE: 92 DEGREES
SYSTOLIC BLOOD PRESSURE - MUSE: NORMAL MMHG
T AXIS - MUSE: 29 DEGREES
VENTRICULAR RATE- MUSE: 80 BPM

## 2022-07-23 ENCOUNTER — HOSPITAL ENCOUNTER (EMERGENCY)
Facility: CLINIC | Age: 22
Discharge: HOME | End: 2022-07-23
Attending: EMERGENCY MEDICINE | Admitting: EMERGENCY MEDICINE
Payer: MEDICAID

## 2022-07-23 VITALS
OXYGEN SATURATION: 98 % | DIASTOLIC BLOOD PRESSURE: 100 MMHG | HEART RATE: 82 BPM | RESPIRATION RATE: 16 BRPM | TEMPERATURE: 97.8 F | SYSTOLIC BLOOD PRESSURE: 155 MMHG

## 2022-07-23 DIAGNOSIS — F32.A DEPRESSION, UNSPECIFIED DEPRESSION TYPE: ICD-10-CM

## 2022-07-23 DIAGNOSIS — R45.851 VERBALIZES SUICIDAL THOUGHTS: ICD-10-CM

## 2022-07-23 DIAGNOSIS — F20.9 SCHIZOPHRENIA, UNSPECIFIED TYPE (H): ICD-10-CM

## 2022-07-23 LAB
ALBUMIN UR-MCNC: NEGATIVE MG/DL
AMPHETAMINES UR QL: NOT DETECTED
ANION GAP SERPL CALCULATED.3IONS-SCNC: 5 MMOL/L (ref 3–14)
APAP SERPL-MCNC: <2 MG/L (ref 10–30)
APPEARANCE UR: CLEAR
BACTERIA #/AREA URNS HPF: ABNORMAL /HPF
BARBITURATES UR QL SCN: NOT DETECTED
BASOPHILS # BLD AUTO: 0 10E3/UL (ref 0–0.2)
BASOPHILS NFR BLD AUTO: 1 %
BENZODIAZ UR QL SCN: NOT DETECTED
BILIRUB UR QL STRIP: NEGATIVE
BUN SERPL-MCNC: 11 MG/DL (ref 7–30)
BUPRENORPHINE UR QL: NOT DETECTED
CALCIUM SERPL-MCNC: 8.8 MG/DL (ref 8.5–10.1)
CANNABINOIDS UR QL: NOT DETECTED
CHLORIDE BLD-SCNC: 109 MMOL/L (ref 94–109)
CO2 SERPL-SCNC: 25 MMOL/L (ref 20–32)
COCAINE UR QL SCN: NOT DETECTED
COLOR UR AUTO: YELLOW
CREAT SERPL-MCNC: 0.9 MG/DL (ref 0.52–1.04)
D-METHAMPHET UR QL: NOT DETECTED
EOSINOPHIL # BLD AUTO: 0.3 10E3/UL (ref 0–0.7)
EOSINOPHIL NFR BLD AUTO: 3 %
ERYTHROCYTE [DISTWIDTH] IN BLOOD BY AUTOMATED COUNT: 12.5 % (ref 10–15)
ETHANOL SERPL-MCNC: 0.01 G/DL
GFR SERPL CREATININE-BSD FRML MDRD: >90 ML/MIN/1.73M2
GLUCOSE BLD-MCNC: 100 MG/DL (ref 70–99)
GLUCOSE UR STRIP-MCNC: NEGATIVE MG/DL
HCG UR QL: NEGATIVE
HCT VFR BLD AUTO: 43.9 % (ref 35–47)
HGB BLD-MCNC: 14.7 G/DL (ref 11.7–15.7)
HGB UR QL STRIP: ABNORMAL
IMM GRANULOCYTES # BLD: 0 10E3/UL
IMM GRANULOCYTES NFR BLD: 0 %
KETONES UR STRIP-MCNC: NEGATIVE MG/DL
LEUKOCYTE ESTERASE UR QL STRIP: NEGATIVE
LYMPHOCYTES # BLD AUTO: 3.2 10E3/UL (ref 0.8–5.3)
LYMPHOCYTES NFR BLD AUTO: 38 %
MCH RBC QN AUTO: 30.2 PG (ref 26.5–33)
MCHC RBC AUTO-ENTMCNC: 33.5 G/DL (ref 31.5–36.5)
MCV RBC AUTO: 90 FL (ref 78–100)
METHADONE UR QL SCN: NOT DETECTED
MONOCYTES # BLD AUTO: 0.5 10E3/UL (ref 0–1.3)
MONOCYTES NFR BLD AUTO: 5 %
MUCOUS THREADS #/AREA URNS LPF: PRESENT /LPF
NEUTROPHILS # BLD AUTO: 4.3 10E3/UL (ref 1.6–8.3)
NEUTROPHILS NFR BLD AUTO: 53 %
NITRATE UR QL: NEGATIVE
NRBC # BLD AUTO: 0 10E3/UL
NRBC BLD AUTO-RTO: 0 /100
OPIATES UR QL SCN: NOT DETECTED
OXYCODONE UR QL SCN: NOT DETECTED
PCP UR QL SCN: NOT DETECTED
PH UR STRIP: 6.5 [PH] (ref 5–7)
PLATELET # BLD AUTO: 213 10E3/UL (ref 150–450)
POTASSIUM BLD-SCNC: 3.8 MMOL/L (ref 3.4–5.3)
PROPOXYPH UR QL: NOT DETECTED
RBC # BLD AUTO: 4.86 10E6/UL (ref 3.8–5.2)
RBC URINE: 11 /HPF
SALICYLATES SERPL-MCNC: 3 MG/DL
SODIUM SERPL-SCNC: 139 MMOL/L (ref 133–144)
SP GR UR STRIP: 1.01 (ref 1–1.03)
SQUAMOUS EPITHELIAL: 4 /HPF
TRICYCLICS UR QL SCN: NOT DETECTED
UROBILINOGEN UR STRIP-MCNC: NORMAL MG/DL
WBC # BLD AUTO: 8.3 10E3/UL (ref 4–11)
WBC URINE: 2 /HPF

## 2022-07-23 PROCEDURE — 80179 DRUG ASSAY SALICYLATE: CPT | Performed by: EMERGENCY MEDICINE

## 2022-07-23 PROCEDURE — 80048 BASIC METABOLIC PNL TOTAL CA: CPT | Performed by: EMERGENCY MEDICINE

## 2022-07-23 PROCEDURE — 99285 EMERGENCY DEPT VISIT HI MDM: CPT | Mod: 25 | Performed by: EMERGENCY MEDICINE

## 2022-07-23 PROCEDURE — 82077 ASSAY SPEC XCP UR&BREATH IA: CPT | Performed by: EMERGENCY MEDICINE

## 2022-07-23 PROCEDURE — 36415 COLL VENOUS BLD VENIPUNCTURE: CPT | Performed by: EMERGENCY MEDICINE

## 2022-07-23 PROCEDURE — 80306 DRUG TEST PRSMV INSTRMNT: CPT | Performed by: EMERGENCY MEDICINE

## 2022-07-23 PROCEDURE — 85025 COMPLETE CBC W/AUTO DIFF WBC: CPT | Performed by: EMERGENCY MEDICINE

## 2022-07-23 PROCEDURE — 81001 URINALYSIS AUTO W/SCOPE: CPT | Mod: XU | Performed by: EMERGENCY MEDICINE

## 2022-07-23 PROCEDURE — 99285 EMERGENCY DEPT VISIT HI MDM: CPT | Performed by: EMERGENCY MEDICINE

## 2022-07-23 PROCEDURE — 80143 DRUG ASSAY ACETAMINOPHEN: CPT | Performed by: EMERGENCY MEDICINE

## 2022-07-23 PROCEDURE — 81025 URINE PREGNANCY TEST: CPT | Performed by: EMERGENCY MEDICINE

## 2022-07-23 PROCEDURE — 90791 PSYCH DIAGNOSTIC EVALUATION: CPT

## 2022-07-23 RX ORDER — LAMOTRIGINE 200 MG/1
1 TABLET ORAL 2 TIMES DAILY
Status: ON HOLD | COMMUNITY
Start: 2022-01-14 | End: 2022-08-21

## 2022-07-23 RX ORDER — ETONOGESTREL 68 MG/1
68 IMPLANT SUBCUTANEOUS
COMMUNITY
Start: 2020-10-23 | End: 2023-10-23

## 2022-07-23 RX ORDER — DIVALPROEX SODIUM 500 MG/1
1500 TABLET, EXTENDED RELEASE ORAL AT BEDTIME
COMMUNITY
Start: 2022-01-21

## 2022-07-23 RX ORDER — CETIRIZINE HYDROCHLORIDE 10 MG/1
10 TABLET ORAL DAILY
COMMUNITY

## 2022-07-23 RX ORDER — OMEPRAZOLE 40 MG/1
40 CAPSULE, DELAYED RELEASE ORAL
COMMUNITY
Start: 2022-07-11 | End: 2023-07-11

## 2022-07-23 RX ORDER — FAMOTIDINE 40 MG/1
40 TABLET, FILM COATED ORAL
COMMUNITY
Start: 2022-07-10 | End: 2022-08-09

## 2022-07-23 RX ORDER — LEVOTHYROXINE SODIUM 50 UG/1
50 TABLET ORAL EVERY MORNING
COMMUNITY
Start: 2022-06-27

## 2022-07-23 RX ORDER — CLONAZEPAM 0.5 MG/1
1 TABLET ORAL 2 TIMES DAILY PRN
COMMUNITY
Start: 2022-07-12 | End: 2023-05-25

## 2022-07-23 NOTE — DISCHARGE INSTRUCTIONS
Aftercare Plan    Follow up with prescribing physician for medication management appointment    If I am feeling unsafe or I am in a crisis, I will:   Contact my established care providers   Call the National Suicide Prevention Lifeline: 309.122.6469   Suicide and Crisis Hotline: 9-8-8  Go to the nearest emergency room   Call 911     Warning signs that I or other people might notice when a crisis is developing for me:     I am having increasing suicidal thoughts that turn to plans with intent or means  I am having additional urges to self-harm    My emotions are of hopelessness; feeling like there's no way out.  Rage or anger.  Engaging in risky activities without thinking  Withdrawing from family/friends  Dramatic mood swings  Drastic personality changes   Use of alcohol or drugs  Postings on social media  Neglect of personal hygiene or cares     Things I am able to do on my own to cope or help me feel better:    Spending quality time with loved ones  Staying hydrated  Eating balanced meals  Going for a walk every day  Take care of daily responsibilities/needs  Focus on positive self-talk vs negative self-talk    Things that I am able to do with others to cope or help me better:   Exercise  Music  Deep breathing  Meditations  Journal  Self-regulate  Self check-in  Ask for help    Things I can use or do for distraction:   Reach out to/spend time with family, friends  Shower  Exercise  Chores or do a project  Listen to music  Watch movie/TV  Listening to music  Journaling  Reading a book  Meditating  Call a friend    Changes I can make to support my mental health and wellness:    -I will abstain from all mood altering chemicals not currently prescribed to me   -I will attend scheduled mental health therapy and psychiatric appointments and follow all   recommendations  -I will commit to 30 minutes of self care daily - this can be as simple as taking a shower, going for a   walk, cooking a meal, read, writing, etc  -I  will practice square breathing when I begin to feel anxious - in breath through the nose for the count   of 4 and the first line on the square. Out breath through the mouth for the count of 4 for the second line   of the square. Repeat to complete the square. Repeat the square as many times as needed.  - I will use distraction skills of: going for walks, watching TV, spending time outside, calling a friend or   family member  -Use community resources, including hotline numbers, [facility] crisis and support meetings  -Maintain a daily schedule/routine  -Practice deep breathing skills  -Download a meditation charles and spend 15-20 minutes per day mediating/relaxing. Some apps to   download include: Calm, Headspace and Insight Timer. All 3 of these apps have free version    Reduce Extreme Emotion  QUICKLY:  Changing Your Body Chemistry      T:  Change your body Temperature to change your autonomic nervous system   Use Ice Water to calm yourself down FAST   Put your face in a bowl of ice water (this is the best way; have the person keep his/her face in ice water for 30-45 seconds - initial research is showing that the longer s/he can hold her/his face in the water, the better the response), or   Splash ice water on your face, or hold an ice pack on your face      I:  Intensely exercise to calm down a body revved up by emotion   Examples: running, walking fast, jumping, playing basketball, weight lifting, swimming, calisthenics, etc.   Engage in exercises that DO NOT include violent behaviors. Exercises that utilize violent behaviors tend to function as  behavioral rehearsal,  and rather than calming the person down, may actually  rev  the person up more, increasing the likelihood of violence, and lessening the likelihood that they will  burn off  energy     P:  Progressively relax your muscles   Starting with your hands, moving to your forearms, upper arms, shoulders, neck, forehead, eyes, cheeks and lips, tongue and teeth,  chest, upper back, stomach, buttocks, thighs, calves, ankles, feet   Tense (10 seconds,   of the way), then relax each muscle (all the way)   Notice the tension   Notice the difference when relaxed (by tensing first, and then relaxing, you are able to get a more thorough relaxation than by simply relaxing)      P: Paced breathing to relax   The standard technique is to begin with counting the number of steps one takes for a typical inhale, then counting the steps one takes for a typical exhale, and then lengthening the amount of steps for the exhalation by one or two steps.  OR  Repeat this pattern for 1-2 minutes  Inhale for four (4) seconds   Exhale for six (6) to eight (8) seconds   Research demonstrated that one can change one's overall level of anxiety by doing this exercise for even a few minutes per day      People in my life that I can ask for help:   Family  Providers    Your [facility] has a mental health crisis team you can call 24/7:   [facility] Crisis [phone]    Other things that are important when I'm in crisis:   Ask for help    Additional resources and information:     Mental Health Apps  My3  [email or web address]://[email or web address].[email or web address]/    VirtualHopeBox  [email or web address]/apps/virtual-hope-[email or web address]       Professionals or Agencies I Can Contact During A Crisis:       Crisis Lines  Crisis Text Line  Text 241923  You will be connected with a trained live crisis counselor to provide support.    [employer] (LGBTQ Youth Crisis Line)  2.946.228.0929  text START to 287-163    National [place] on Mental Illness (ISI)  945.363.7703 or 1.888.ISI.HELPS    National Suicide Prevention Lifeline at 2-492-484-FHCA (2033)     Throughout  [place]: call **CRISIS (**526305)     Crisis Text Line: is available for free, 24/7 by texting [place] to 147384    Community Resources  Fast Tracker  Linking people to mental health and substance use disorder resources  fasttrackermn.org     [place] Mental Health Warm Line   "Peer to peer support  Monday thru Saturday, 12 pm to 10 pm  710.525.3036 or 8.283.015.3300  Text \"Support\" to 05399     National [place] on Mental Illness (www.[place].pita.org): 775.726.6162 or 300-891-7419     [facility] Phone (free remote counseling): 512.350.2397 Web address:   https://Cirro.org/     [employer] (filter for insurance, gender preference, etc.)    [facility]   [phone]  Intake appointment will be virtual, following appointments can be in person or virtual.   **IMMEDIATE OPENINGS**    [facility]  [phone]  *offers individual therapy, medication management and Mental Health Case Workers; can self refer    Please follow up with scheduled providers to ensure all necessary paperwork is filled out prior to your   scheduled telehealth appointments.     Coordinators from [facility] will be calling within two business days to ensure   that you have the resources you may need or provide assistance with scheduling (Phone number: [phone].).    Remember: give the referrals 3 sessions prior to calling it quits. Do you trust them? Do you feel   understood? Do you think they can help? Check in with yourself after each session    Please reach out to the [facility]([phone]) regarding further mental health appointment needs for this emergency department visit.    [facility] SCHEDULING:  Today you were seen by a licensed mental health professional through [facility] ([facility])  for a crisis assessment in the Emergency Department at [facility].  It is recommended that you follow up with your estabished providers (psychiatrist, menta health therapist, and/or primary care doctor - as relevant) as soon as possible. Coordinators from [facility] will be calling you in the next 24-48 hours to ensure that you have the resources you need.  You can so contact [facility] coordinators directly " at [phone].     [facility] maintains an extensive network of licensed behavioral health providers to connect patients with the services they need.  We do not charge providers a fee to participate in our referral network.  We match patients with providers based on a patient s specific needs, insurance coverage, and location.  Our first effort will be to refer you to a provider within your care system, and will utilize providers outside your care system as needed.     Additional information  Today you were seen by a licensed mental health professional through T[facility] Transition services, [facility] ([facility])  for a crisis assessment in the Emergency Department at [facility].  It is recommended that you follow up with your established providers (psychiatrist, mental health therapist, and/or primary care doctor - as relevant) as soon as possible. Coordinators from [facility] will be calling you in the next 24-48 hours to ensure that you have the resources you need.  You can also contact [facility] coordinators directly at [phone]. You may have been scheduled for or offered an appointment with a mental health provider. [facility] maintains an extensive network of licensed behavioral health providers to connect patients with the services they need.  We do not charge providers a fee to participate in our referral network.  We match patients with providers based on a patient's specific needs, insurance coverage, and location.  Our first effort will be to refer you to a provider within your care system, and will utilize providers outside your care system as needed.

## 2022-07-23 NOTE — ED TRIAGE NOTES
Pt arrived with her day who states pt has been sleeping more then normal and reports that things are getting harder. Dad states pt was seen at the [facility] about a month ago and had some medication changes but has not had any follow up. Pt was diagnosed with covid about 5 days ago. Pt states when she is sick her medications don't work as well. Pts dad reports pt has a history of cutting but no suicide attempts.      Triage Assessment     Row Name 07/23/22 113       Triage Assessment (Adult)    Airway WDL WDL       Respiratory WDL    Respiratory WDL WDL

## 2022-07-23 NOTE — ED NOTES
I attempted twice to call pt and notify her med was sent to [place]. Phone keeps ringing busy.   Voice message left for mom-awaiting call back.

## 2022-07-23 NOTE — CONSULTS
Diagnostic Evaluation Consultation  Crisis Assessment    Patient was assessed: remote  Patient location: [facility]  Was a release of information signed: No. Reason: guardian not present      Referral Data and Chief Complaint  Yasmine is a 21 year old, who uses she/her pronouns, and presents to the ED with family/friends. Patient is referred to the ED by self. Patient is presenting to the ED for the following concerns: pt presents for suicide ideation.      Informed Consent and Assessment Methods     Patient is under the guardianship of Josefina Smith.  Writer met with patient and explained the crisis assessment process, including applicable information disclosures and limits to confidentiality, assessed understanding of the process, and obtained consent to proceed with the assessment. Patient was observed to be able to participate in the assessment as evidenced by alert and oriented. Assessment methods included conducting a formal interview with patient, review of medical records, collaboration with medical staff, and obtaining relevant collateral information from family and community providers when available..     Over the course of this crisis assessment provided reassurance, offered validation, engaged patient in problem solving and disposition planning, worked with patient on safety and aftercare planning, worked with patient on brief, therapeutic activity: various coping skill, assisted in processing patient's thoughts and feeling relating to having suicide ideation and provided psychoeducation. Patient's response to interventions was pt wants to go inpatient.     Summary of Patient Situation     Pt presents to ED with her father for suicide ideation. Pt reports she is concerned her medication needs to be adjusted, as she is having issues sleeping, coping skills have been less effective, and gets agitated easier. Pt endorses suicide ideation, with no plan, and identifies having access to knives and medication  at home. Pt endorses having intent to hurt herself, with no plan to. Pt reports she had paranoia earlier today, that people are going to hurt her and denies current hallucinations or delusions. Pt appears to be functioning below her baseline. Pt presents as fixated, alert, oriented, and engaged.    Brief Psychosocial History     Pt reports she lives with her mother and spends every other weekend at her father's house. Pt reports her mother is her guardian and identifies her parents and grandparents to be most supportive of her. Pt denies school involvement, employment,  status, and identifies as [employer].     Significant Clinical History     Pt reports a history of schizophrenia, bipolar, ASD, and PTSD. Pt reports she has a therapist and medication provider. Pt reports a history of 3 previous ED visits for mental health, with one inpatient stay. Pt denies [facility] treatment history.     Collateral Information    The following information was received from Pelon Smith whose relationship to the patient is father. Information was obtained via phone. Their phone number is [phone] and they last had contact with patient on today.    What happened today: father reports pt was supposed to visit him from Friday-Sunday, which pt does every other weekend. Father reports he received a call from pt, as she had locked herself in basement and was saying she was thinking about self-harm.    What is different about patient's functioning: father reports pt sleeps during the day and struggles to have consistent sleep. Father reports pt often wakes himself or mother up about nightmares.    Concern about alcohol/drug use: No father reports pt has used marijuana in the past    What do you think the patient needs: father reports pt had a new medication provider and no follow-ups have happened.    Has patient made comments about wanting to kill themselves/others:  Yes father reports pt has had suicide ideation.    If d/c is recommended,  can they take part in safety/aftercare planning: No father reports he is not able to bring pt to appointments, as he lives 2 hours away and pt lives primarily with mother. Father reports he is not guardian to pt.    Other information: father reports that pt and mother have a tense relationship at times. Father reports he does not know names of current providers.      The following information was received from Josefina Smith whose relationship to the patient is guardian/mother. Information was obtained via phone. Their phone number is [phone] and they last had contact with patient on today.    What happened today: mother confirms father report.    What is different about patient's functioning: Mother reports pt has been sleeping and is perez     Concern about alcohol/drug use: no    What do you think the patient needs: mother reports pt needs medication management     Has patient made comments about wanting to kill themselves/others:  Pt has made statements about having suicidal thoughts    If d/c is recommended, can they take part in safety/aftercare planning: mother reports pt is able to coordinate and assist pt is getting services.    Other information: mother reports pt has a medication management appointment on 8/3/2022       Risk Assessment  ESS-6  1.a. Over the past 2 weeks, have you had thoughts of killing yourself? Yes  1.b. Have you ever attempted to kill yourself and, if yes, when did this last happen? No   2. Recent or current suicide plan? No   3. Recent or current intent to act on ideation? Yes  4. Lifetime psychiatric hospitalization? Yes  5. Pattern of excessive substance use? Yes  6. Current irritability, agitation, or aggression? No  Scoring note: BOTH 1a and 1b must be yes for it to score 1 point, if both are not yes it is zero. All others are 1 point per number. If all questions 1a/1b - 6 are no, risk is negligible. If one of 1a/1b is yes, then risk is mild. If either question 2 or 3, but not  "both, is yes, then risk is automatically moderate regardless of total score. If both 2 and 3 are yes, risk is automatically high regardless of total score.      Score: 3, moderate risk      Does the patient have access to lethal means? Yes - describe pt identifies access to medications and knives     Does the patient engage in non-suicidal self-injurious behavior (NSSI/SIB)? no. However, patient has a history of SIB via vutting. Pt has not engaged in SIB since 2 years ago     Does the patient have thoughts of harming others? No     Is the patient engaging in sexually inappropriate behavior?  no        Current Substance Abuse     Is there recent substance abuse? Substance type(s): nicotine Frequency: daily Quantity: 1 pack Method: smoke Duration: age11 Last use: today     Was a urine drug screen or blood alcohol level obtained: No       Mental Status Exam     Affect: Appropriate   Appearance: Appropriate    Attention Span/Concentration: Attentive  Eye Contact: Engaged   Fund of Knowledge: Appropriate    Language /Speech Content: Fluent   Language /Speech Volume: Normal    Language /Speech Rate/Productions: Normal    Recent Memory: Intact   Remote Memory: Intact   Mood: Depressed    Orientation to Person: Yes    Orientation to Place: Yes   Orientation to Time of Day: Yes    Orientation to Date: Yes    Situation (Do they understand why they are here?): Yes    Psychomotor Behavior: Normal    Thought Content: Clear   Thought Form: Intact      History of commitment: No       Medication    Psychotropic medications: Yes. Pt is currently taking see HPI. Medication compliant: Yes. Recent medication changes: No  Medication changes made in the last two weeks: No       Current Care Team    Primary Care Provider: No  Psychiatrist: Joselyn Reddy @ [facility]  Therapist: Nedra Prasad  : \"I think I do\"     CTSS or ARMHS: No  ACT Team: No  Other: No      Diagnosis    295.70  (F25) Schizoaffective Disorder Bipolar Type "   Autism Spectrum Disorder 299.00(F84.0)  Associated Current severity for Criterion A and Criterion B: 299.00(F84.0)  With accompanying intellectual impairment,  - by history       Clinical Summary and Substantiation of Recommendations    After therapeutic assessment, intervention and aftercare planning by ED care team and LM and in consultation with attending provider, the patient's circumstances and mental state were appropriate for outpatient management. It is the recommendation of this clinician that pt discharge with OP MH support. A this time the pt is not presenting as an acute risk to self or others due to the following factors: pt endorses wanting a medication adjustment. Pt reports having suicide ideation with no plan, but has intent. Pt identifies access to medications and knives at home. Pt appears fixated on inpatient recommendation, however, was redirectable to identify skills she can use and supports she can reach out to. Pt would benefit from a medication review with her prescribing physician and identifies having an appointment soon. Appointment was confirmed to be on August 3rd. Safety plan in AVS.    Disposition    Recommended disposition: Individual Therapy and Medication Management       Reviewed case and recommendations with attending provider. Attending Name: Dr. Sesay       Attending concurs with disposition: Yes       Patient concurs with disposition: Yes       Guardian concurs with disposition: Yes       Final disposition: Individual therapy  and Medication management.         Assessment Details    Patient interview started at: 12:15PM and completed at: 12:27PM.     Total duration spent on the patient case in minutes: .50 hrs      CPT code(s) utilized: 17357 - Psychotherapy for Crisis - 60 (30-74*) min       LINWOOD Morris, [place]  DEC - Triage & Transition Services      Aftercare Plan    Follow up with prescribing physician for medication management appointment    If I am feeling  unsafe or I am in a crisis, I will:   Contact my established care providers   Call the National Suicide Prevention Lifeline: 269.563.1352   Go to the nearest emergency room   Call [phone]     Warning signs that I or other people might notice when a crisis is developing for me:     I am having increasing suicidal thoughts that turn to plans with intent or means  I am having additional urges to self-harm    My emotions are of hopelessness; feeling like there's no way out.  Rage or anger.  Engaging in risky activities without thinking  Withdrawing from family/friends  Dramatic mood swings  Drastic personality changes   Use of alcohol or drugs  Postings on social media  Neglect of personal hygiene or cares     Things I am able to do on my own to cope or help me feel better:    Spending quality time with loved ones  Staying hydrated  Eating balanced meals  Going for a walk every day  Take care of daily responsibilities/needs  Focus on positive self-talk vs negative self-talk    Things that I am able to do with others to cope or help me better:   Exercise  Music  Deep breathing  Meditations  Journal  Self-regulate  Self check-in  Ask for help    Things I can use or do for distraction:   Reach out to/spend time with family, friends  Shower  Exercise  Chores or do a project  Listen to music  Watch movie/TV  Listening to music  Journaling  Reading a book  Meditating  Call a friend    Changes I can make to support my mental health and wellness:    -I will abstain from all mood altering chemicals not currently prescribed to me   -I will attend scheduled mental health therapy and psychiatric appointments and follow all   recommendations  -I will commit to 30 minutes of self care daily - this can be as simple as taking a shower, going for a   walk, cooking a meal, read, writing, etc  -I will practice square breathing when I begin to feel anxious - in breath through the nose for the count   of 4 and the first line on the square. Out  breath through the mouth for the count of 4 for the second line   of the square. Repeat to complete the square. Repeat the square as many times as needed.  - I will use distraction skills of: going for walks, watching TV, spending time outside, calling a friend or   family member  -Use community resources, including hotline numbers, Critical access hospital crisis and support meetings  -Maintain a daily schedule/routine  -Practice deep breathing skills  -Download a meditation charles and spend 15-20 minutes per day mediating/relaxing. Some apps to   download include: Calm, Headspace and Insight Timer. All 3 of these apps have free version    Reduce Extreme Emotion  QUICKLY:  Changing Your Body Chemistry      T:  Change your body Temperature to change your autonomic nervous system     Use Ice Water to calm yourself down FAST     Put your face in a bowl of ice water (this is the best way; have the person keep his/her face in ice water for 30-45 seconds - initial research is showing that the longer s/he can hold her/his face in the water, the better the response), or     Splash ice water on your face, or hold an ice pack on your face      I:  Intensely exercise to calm down a body revved up by emotion     Examples: running, walking fast, jumping, playing basketball, weight lifting, swimming, calisthenics, etc.     Engage in exercises that DO NOT include violent behaviors. Exercises that utilize violent behaviors tend to function as  behavioral rehearsal,  and rather than calming the person down, may actually  rev  the person up more, increasing the likelihood of violence, and lessening the likelihood that they will  burn off  energy     P:  Progressively relax your muscles     Starting with your hands, moving to your forearms, upper arms, shoulders, neck, forehead, eyes, cheeks and lips, tongue and teeth, chest, upper back, stomach, buttocks, thighs, calves, ankles, feet     Tense (10 seconds,   of the way), then relax each muscle (all the  "way)     Notice the tension     Notice the difference when relaxed (by tensing first, and then relaxing, you are able to get a more thorough relaxation than by simply relaxing)      P: Paced breathing to relax     The standard technique is to begin with counting the number of steps one takes for a typical inhale, then counting the steps one takes for a typical exhale, and then lengthening the amount of steps for the exhalation by one or two steps.  OR    Repeat this pattern for 1-2 minutes    Inhale for four (4) seconds     Exhale for six (6) to eight (8) seconds     Research demonstrated that one can change one's overall level of anxiety by doing this exercise for even a few minutes per day      People in my life that I can ask for help:   Family  Providers    Your [facility] has a mental health crisis team you can call 24/7:   [facility] Crisis [phone]    Other things that are important when I'm in crisis:   Ask for help    Additional resources and information:     Mental Health Apps  My3  [email or web address]    VirtualHopeBox  [email or web address]/virtual-hope-box/       Professionals or Agencies I Can Contact During A Crisis:       Crisis Lines  Crisis Text Line  Text 044176  You will be connected with a trained live crisis counselor to provide support.    The [employer]hele Project (LGBTQ Youth Crisis Line)  3.990.878.2448  text START to 621-905    National [place] on Mental Illness (ISI)  961.292.2477 or 3.887.ISI.HELPS    National Suicide Prevention Lifeline at 7-384-310-DAGI (0575)     Throughout  [place]: call **CRISIS (**419774)     Crisis Text Line: is available for free, 24/7 by texting [place] to 200462    Community Resources  Fast Tracker  Linking people to mental health and substance use disorder resources  fastCorceuticalsckOutcome Referralsn.org     [place] Mental Health Warm Line  Peer to peer support  Monday thru Saturday, 12 pm to 10 pm  977.916.4089 or 2.397.541.8093  Text \"Support\" to 67362   "   [place] on Mental Illness (www.[place].pita.org): 774-855-9911 or 882-854-6036     [facility] [place] Phone (free remote counseling): 582.752.3956 Web address:   https://Penumbra.org/     www.[employer] (filter for insurance, gender preference, etc.)    [facility]   [phone]  Intake appointment will be virtual, following appointments can be in person or virtual.   **IMMEDIATE OPENINGS**    [facility]  [phone]  *offers individual therapy, medication management and Mental Health Case Workers; can self refer    Please follow up with scheduled providers to ensure all necessary paperwork is filled out prior to your   scheduled telehealth appointments.     Coordinators from [facility] will be calling within two business days to ensure   that you have the resources you may need or provide assistance with scheduling (Phone number: [phone].).    Remember: give the referrals 3 sessions prior to calling it quits. Do you trust them? Do you feel   understood? Do you think they can help? Check in with yourself after each session    Please reach out to the [facility]([phone]) regarding further mental health appointment needs for this emergency department visit.    [facility] SCHEDULING:  Today you were seen by a licensed mental health professional through [facility] and Transition sevices, [facility] (P)  for a crisis assessment in the Emergency Department at [facility].  It is recommended that you follow up with your estabished providers (psychiatrist, menta health therapist, and/or primary care doctor - as relevant) as soon as possible. Coordinators from [facility] will be calling you in the next 24-48 hours to ensure that you have the resources you need.  You can so contact P coordinators directly at [phone].     [facility] maintains an extensive network of licensed behavioral health providers to connect patients with  the services they need.  We do not charge providers a fee to participate in our referral network.  We match patients with providers based on a patient s specific needs, insurance coverage, and location.  Our first effort will be to refer you to a provider within your care system, and will utilize providers outside your care system as needed.     Additional information  Today you were seen by a licensed mental health professional through Triage and Transition services, [facility] (P)  for a crisis assessment in the Emergency Department at [facility].  It is recommended that you follow up with your established providers (psychiatrist, mental health therapist, and/or primary care doctor - as relevant) as soon as possible. Coordinators from [facility] will be calling you in the next 24-48 hours to ensure that you have the resources you need.  You can also contact [facility] coordinators directly at [phone]. You may have been scheduled for or offered an appointment with a mental health provider. [facility] maintains an extensive network of licensed behavioral health providers to connect patients with the services they need.  We do not charge providers a fee to participate in our referral network.  We match patients with providers based on a patient's specific needs, insurance coverage, and location.  Our first effort will be to refer you to a provider within your care system, and will utilize providers outside your care system as needed.

## 2022-07-23 NOTE — ED NOTES
Coming out of room requesting to go outside and smoke. Redirected back to room offered distraction. TV - fluids- lunch.

## 2022-07-23 NOTE — ED PROVIDER NOTES
History     Chief Complaint   Patient presents with     Suicidal     HPI  Yasmine Smith is a 21 year old female who presents to the emergency room with dad over concerns of depression and suicidal ideation.  She has been sleeping more and feels that things are getting harder.  She says that she has thoughts of cutting herself.  Used to self-harm by cutting on her arms.  Has not done anything recently but is having more thoughts of doing this.  Also has thoughts of overdosing on her pills.  Says that she has not taken anything more than prescribed, has not tried to ingest excessive amounts of Tylenol, aspirin, ibuprofen, or other pills.  She says she feels scared at home and does not feel safe.  Dad reports that approximately 1 month ago they were in the emergency room down at [facility], and after spending 3 days in the emergency room, Yasmine was admitted to behavioral health unit.  They made medication changes at that time, but says they have not had any follow-up.  Additionally, Yasmine has been ill with COVID-19, diagnosed positive on 7/18/2022.  She has been more fatigued due to this, but does not note running a fever in the last few days.  Has not been vomiting.    Allergies:  Allergies   Allergen Reactions     Aripiprazole Other (See Comments)     Restlessness     Banana Swelling     Throat swells/ puffy     Food Swelling     Cantalope, causes swelling/puffiness of throat       Mirtazapine Other (See Comments)     Weight gain     Olanzapine Other (See Comments)     Weight gain  And irritability     Risperidone Other (See Comments)     Breast tenderness       Problem List:    Patient Active Problem List    Diagnosis Date Noted     Pyelonephritis, acute 08/10/2013     Priority: High     Schizophrenia (H) 05/01/2022     Priority: Medium     Headache 04/06/2018     Priority: Medium     Closed fracture of metacarpal bone 04/06/2018     Priority: Medium     Abnormal magnetic resonance imaging of  bone 04/06/2018     Priority: Medium     Hypokalemia 08/10/2013     Priority: Medium     Nausea & vomiting 08/10/2013     Priority: Medium        Past Medical History:    Past Medical History:   Diagnosis Date     Anxiety disorder      Autism      Bipolar disorder (H)        Past Surgical History:    Past Surgical History:   Procedure Laterality Date     ENT SURGERY      tonsils, adenoids, turbulance       Family History:    History reviewed. No pertinent family history.    Social History:  Marital Status:  Single [1]  Social History     Tobacco Use     Smoking status: Current Every Day Smoker     Packs/day: 0.50     Smokeless tobacco: Never Used     Tobacco comment: parents smoke outside   Substance Use Topics     Alcohol use: No     Drug use: No        Medications:    acetaminophen (TYLENOL) 325 MG tablet  cetirizine (ZYRTEC) 10 MG tablet  cholecalciferol (VITAMIN D3) 1250 mcg (80280 units) capsule  clonazePAM (KLONOPIN) 0.5 MG tablet  divalproex sodium extended-release (DEPAKOTE ER) 500 MG 24 hr tablet  etonogestrel (NEXPLANON) 68 MG IMPL  famotidine (PEPCID) 40 MG tablet  gabapentin (NEURONTIN) 600 MG tablet  hydrOXYzine (VISTARIL) 50 MG capsule  lamoTRIgine (LAMICTAL) 200 MG tablet  levothyroxine (SYNTHROID/LEVOTHROID) 50 MCG tablet  omeprazole (PRILOSEC) 40 MG DR capsule  ondansetron (ZOFRAN) 8 MG tablet  polyethylene glycol (MIRALAX) 17 g packet  senna (SENOKOT) 8.6 MG tablet  sertraline (ZOLOFT) 50 MG tablet  traZODone (DESYREL) 50 MG tablet  vitamin D3 (CHOLECALCIFEROL) 1.25 MG (14896 UT) capsule  ibuprofen (ADVIL/MOTRIN) 800 MG tablet  lamoTRIgine (LAMICTAL) 200 MG tablet  melatonin 3 MG tablet  ziprasidone (GEODON) 80 MG capsule          Review of Systems   All other systems reviewed and are negative.      Physical Exam   BP: (!) 155/100  Pulse: 82  Temp: 97.8  F (36.6  C)  Resp: 16  SpO2: 98 %      Physical Exam  Vitals and nursing note reviewed.   Constitutional:       General: She is not in acute  distress.     Appearance: She is obese. She is not diaphoretic.   HENT:      Head: Normocephalic and atraumatic.   Eyes:      General: No scleral icterus.     Pupils: Pupils are equal, round, and reactive to light.   Cardiovascular:      Heart sounds: Normal heart sounds.   Pulmonary:      Effort: Pulmonary effort is normal.   Musculoskeletal:         General: No tenderness.   Skin:     General: Skin is warm.      Findings: No lesion or rash.   Neurological:      Mental Status: She is alert.   Psychiatric:         Mood and Affect: Affect is flat.         Speech: Speech normal.         Behavior: Behavior is cooperative.         Thought Content: Thought content includes suicidal ideation. Thought content includes suicidal plan.         ED Course       Suicide assessment completed by mental health (D.E.C., LCSW, etc.)       Procedures              Critical Care time:  none               Results for orders placed or performed during the hospital encounter of 07/23/22 (from the past 24 hour(s))   CBC with platelets differential    Narrative    The following orders were created for panel order CBC with platelets differential.  Procedure                               Abnormality         Status                     ---------                               -----------         ------                     CBC with platelets and d...[[id number]]                      Final result                 Please view results for these tests on the individual orders.   Basic metabolic panel   Result Value Ref Range    Sodium 139 133 - 144 mmol/L    Potassium 3.8 3.4 - 5.3 mmol/L    Chloride 109 94 - 109 mmol/L    Carbon Dioxide (CO2) 25 20 - 32 mmol/L    Anion Gap 5 3 - 14 mmol/L    Urea Nitrogen 11 7 - 30 mg/dL    Creatinine 0.90 0.52 - 1.04 mg/dL    Calcium 8.8 8.5 - 10.1 mg/dL    Glucose 100 (H) 70 - 99 mg/dL    GFR Estimate >90 >60 mL/min/1.73m2   Ethyl Alcohol Level   Result Value Ref Range    Alcohol ethyl 0.01 <=0.01 g/dL   Salicylate  level   Result Value Ref Range    Salicylate 3 <20 mg/dL   Acetaminophen level   Result Value Ref Range    Acetaminophen <2 (L) 10 - 30 mg/L   CBC with platelets and differential   Result Value Ref Range    WBC Count 8.3 4.0 - 11.0 10e3/uL    RBC Count 4.86 3.80 - 5.20 10e6/uL    Hemoglobin 14.7 11.7 - 15.7 g/dL    Hematocrit 43.9 35.0 - 47.0 %    MCV 90 78 - 100 fL    MCH 30.2 26.5 - 33.0 pg    MCHC 33.5 31.5 - 36.5 g/dL    RDW 12.5 10.0 - 15.0 %    Platelet Count 213 150 - 450 10e3/uL    % Neutrophils 53 %    % Lymphocytes 38 %    % Monocytes 5 %    % Eosinophils 3 %    % Basophils 1 %    % Immature Granulocytes 0 %    NRBCs per 100 WBC 0 <1 /100    Absolute Neutrophils 4.3 1.6 - 8.3 10e3/uL    Absolute Lymphocytes 3.2 0.8 - 5.3 10e3/uL    Absolute Monocytes 0.5 0.0 - 1.3 10e3/uL    Absolute Eosinophils 0.3 0.0 - 0.7 10e3/uL    Absolute Basophils 0.0 0.0 - 0.2 10e3/uL    Absolute Immature Granulocytes 0.0 <=0.4 10e3/uL    Absolute NRBCs 0.0 10e3/uL   HCG qualitative urine   Result Value Ref Range    hCG Urine Qualitative Negative Negative   UA with Microscopic reflex to Culture    Specimen: Urine, Clean Catch   Result Value Ref Range    Color Urine Yellow Colorless, Straw, Light Yellow, Yellow    Appearance Urine Clear Clear    Glucose Urine Negative Negative mg/dL    Bilirubin Urine Negative Negative    Ketones Urine Negative Negative mg/dL    Specific Gravity Urine 1.010 1.003 - 1.035    Blood Urine Moderate (A) Negative    pH Urine 6.5 5.0 - 7.0    Protein Albumin Urine Negative Negative mg/dL    Urobilinogen Urine Normal Normal, 2.0 mg/dL    Nitrite Urine Negative Negative    Leukocyte Esterase Urine Negative Negative    Bacteria Urine Few (A) None Seen /HPF    Mucus Urine Present (A) None Seen /LPF    RBC Urine 11 (H) <=2 /HPF    WBC Urine 2 <=5 /HPF    Squamous Epithelials Urine 4 (H) <=1 /HPF    Narrative    Urine Culture not indicated   Urine Drugs of Abuse Screen    Narrative    The following orders were  created for panel order Urine Drugs of Abuse Screen.  Procedure                               Abnormality         Status                     ---------                               -----------         ------                     Urine Drugs of Abuse Scr...[[id number]]  Normal              Final result                 Please view results for these tests on the individual orders.   Urine Drugs of Abuse Screen Panel 13   Result Value Ref Range    Cannabinoids (39-pwu-1-carboxy-9-THC) Not Detected Not Detected, Indeterminate    Phencyclidine Not Detected Not Detected, Indeterminate    Cocaine (Benzoylecgonine) Not Detected Not Detected, Indeterminate    Methamphetamine (d-Methamphetamine) Not Detected Not Detected, Indeterminate    Opiates (Morphine) Not Detected Not Detected, Indeterminate    Amphetamine (d-Amphetamine) Not Detected Not Detected, Indeterminate    Benzodiazepines (Nordiazepam) Not Detected Not Detected, Indeterminate    Tricyclic Antidepressants (Desipramine) Not Detected Not Detected, Indeterminate    Methadone Not Detected Not Detected, Indeterminate    Barbiturates (Butalbital) Not Detected Not Detected, Indeterminate    Oxycodone Not Detected Not Detected, Indeterminate    Propoxyphene (Norpropoxyphene) Not Detected Not Detected, Indeterminate    Buprenorphine Not Detected Not Detected, Indeterminate       Medications - No data to display    Assessments & Plan (with Medical Decision Making)  Yasmine is a 21-year-old female with past medical history of schizophrenia, depression, autism spectrum disorder, Fragile X syndrome, presenting to the emergency room with dad over concerns of increased depression and suicidal ideation.  See history of focused physical exam as above  Obese 21-year-old female in no acute distress, vital signs are stable other than an elevated blood pressure of 155/100.  She is afebrile.  Do not see any marks of self-harm.  She denies ingesting any over-the-counter medications or  prescription medications in an attempt to harm her self.  We will have her assessed by behavioral health to determine if she needs to be admitted for mental health concerns.  Will initiate lab work and swab for COVID in anticipation of possible placement.  Ange with [facility] called and spoke with the patient as well as with her dad who remains in the department.  Made multiple attempts to get a hold of mom, who is legal guardian and whom Yasmine lives with.  Was unable to contact her.  Dad was also unable to get mom on the phone.  Ange does not think that Yasmine meets inpatient criteria, she has generalized thoughts of self-harm but does not have any specific plan to act on this.  Would recommend that mom locks up any medication or sharp objects, but Yasmine does has appropriate follow-up with psychiatry and has outpatient resources.  Dad understands this, but ultimately we need to make sure mom is on the same page as she is the legal guardian.  1430 Spoke with mom, Josefina, who is agreeable to work up and safety plan.  Ange also was able to get a hold of Mckenna by phone afterwards and reiterated the same information.  She called back and stated that we are on the same page and Mckenna is agreeable to accept Yasmine back into her care.  She agrees with the recommendations and will follow up with psychiatry.  Yasmine was becoming upset as she wanted to leave and smoke, and after explaining need to contact mom and that is what we are waiting for, she was easily calmed.  All questions were answered for both her and dad.  Resources were provided for crisis hotline as well as follow-up recommendations.  Return at any time if symptoms worsen or any new concerns develop.  Discharged in no distress     I have reviewed the nursing notes.    I have reviewed the findings, diagnosis, plan and need for follow up with the patient.       New Prescriptions    No medications on file       Final diagnoses:   Schizophrenia,  unspecified type (H)   Depression, unspecified depression type   Verbalizes suicidal thoughts       7/23/2022   [facility] EMERGENCY DEPT     Na Sesay,   07/23/22 [id number]

## 2022-07-23 NOTE — ED NOTES
[facility] called and is anticipating discharge once she is able to speak to patient's primary guardian (mother).

## 2022-08-20 ENCOUNTER — TELEPHONE (OUTPATIENT)
Dept: BEHAVIORAL HEALTH | Facility: CLINIC | Age: 22
End: 2022-08-20

## 2022-08-20 ENCOUNTER — APPOINTMENT (OUTPATIENT)
Dept: GENERAL RADIOLOGY | Facility: CLINIC | Age: 22
End: 2022-08-20
Attending: PHYSICIAN ASSISTANT
Payer: MEDICAID

## 2022-08-20 ENCOUNTER — HOSPITAL ENCOUNTER (EMERGENCY)
Facility: CLINIC | Age: 22
Discharge: PSYCHIATRIC OR SUBSTANCE USE FACILITY | End: 2022-08-21
Attending: PHYSICIAN ASSISTANT | Admitting: FAMILY MEDICINE
Payer: MEDICAID

## 2022-08-20 VITALS
HEART RATE: 78 BPM | TEMPERATURE: 99.1 F | OXYGEN SATURATION: 96 % | DIASTOLIC BLOOD PRESSURE: 84 MMHG | RESPIRATION RATE: 18 BRPM | SYSTOLIC BLOOD PRESSURE: 139 MMHG

## 2022-08-20 DIAGNOSIS — R05.9 COUGH: ICD-10-CM

## 2022-08-20 DIAGNOSIS — F41.9 ANXIETY: ICD-10-CM

## 2022-08-20 DIAGNOSIS — J45.909 ASTHMA: ICD-10-CM

## 2022-08-20 DIAGNOSIS — R45.851 SUICIDAL IDEATION: ICD-10-CM

## 2022-08-20 LAB
ALBUMIN SERPL-MCNC: 3.1 G/DL (ref 3.4–5)
ALP SERPL-CCNC: 45 U/L (ref 40–150)
ALT SERPL W P-5'-P-CCNC: 24 U/L (ref 0–50)
AMPHETAMINES UR QL: NOT DETECTED
ANION GAP SERPL CALCULATED.3IONS-SCNC: 6 MMOL/L (ref 3–14)
APAP SERPL-MCNC: <2 MG/L (ref 10–30)
AST SERPL W P-5'-P-CCNC: 14 U/L (ref 0–45)
BARBITURATES UR QL SCN: NOT DETECTED
BASOPHILS # BLD AUTO: 0.1 10E3/UL (ref 0–0.2)
BASOPHILS NFR BLD AUTO: 1 %
BENZODIAZ UR QL SCN: NOT DETECTED
BILIRUB SERPL-MCNC: 0.2 MG/DL (ref 0.2–1.3)
BUN SERPL-MCNC: 9 MG/DL (ref 7–30)
BUPRENORPHINE UR QL: NOT DETECTED
CALCIUM SERPL-MCNC: 8.7 MG/DL (ref 8.5–10.1)
CANNABINOIDS UR QL: DETECTED
CHLORIDE BLD-SCNC: 107 MMOL/L (ref 94–109)
CO2 SERPL-SCNC: 26 MMOL/L (ref 20–32)
COCAINE UR QL SCN: NOT DETECTED
CREAT SERPL-MCNC: 0.75 MG/DL (ref 0.52–1.04)
D-METHAMPHET UR QL: NOT DETECTED
EOSINOPHIL # BLD AUTO: 0.2 10E3/UL (ref 0–0.7)
EOSINOPHIL NFR BLD AUTO: 3 %
ERYTHROCYTE [DISTWIDTH] IN BLOOD BY AUTOMATED COUNT: 13.2 % (ref 10–15)
ETHANOL SERPL-MCNC: <0.01 G/DL
GFR SERPL CREATININE-BSD FRML MDRD: >90 ML/MIN/1.73M2
GLUCOSE BLD-MCNC: 115 MG/DL (ref 70–99)
HCG UR QL: NEGATIVE
HCT VFR BLD AUTO: 43 % (ref 35–47)
HGB BLD-MCNC: 14.3 G/DL (ref 11.7–15.7)
IMM GRANULOCYTES # BLD: 0 10E3/UL
IMM GRANULOCYTES NFR BLD: 0 %
INR PPP: 0.99 (ref 0.85–1.15)
LYMPHOCYTES # BLD AUTO: 3.1 10E3/UL (ref 0.8–5.3)
LYMPHOCYTES NFR BLD AUTO: 38 %
MAGNESIUM SERPL-MCNC: 2 MG/DL (ref 1.6–2.3)
MCH RBC QN AUTO: 30 PG (ref 26.5–33)
MCHC RBC AUTO-ENTMCNC: 33.3 G/DL (ref 31.5–36.5)
MCV RBC AUTO: 90 FL (ref 78–100)
METHADONE UR QL SCN: NOT DETECTED
MONOCYTES # BLD AUTO: 0.7 10E3/UL (ref 0–1.3)
MONOCYTES NFR BLD AUTO: 9 %
NEUTROPHILS # BLD AUTO: 4 10E3/UL (ref 1.6–8.3)
NEUTROPHILS NFR BLD AUTO: 49 %
NRBC # BLD AUTO: 0 10E3/UL
NRBC BLD AUTO-RTO: 0 /100
OPIATES UR QL SCN: NOT DETECTED
OXYCODONE UR QL SCN: NOT DETECTED
PCP UR QL SCN: NOT DETECTED
PLATELET # BLD AUTO: 197 10E3/UL (ref 150–450)
POTASSIUM BLD-SCNC: 4 MMOL/L (ref 3.4–5.3)
PROPOXYPH UR QL: NOT DETECTED
PROT SERPL-MCNC: 6.5 G/DL (ref 6.8–8.8)
RBC # BLD AUTO: 4.76 10E6/UL (ref 3.8–5.2)
SALICYLATES SERPL-MCNC: 5 MG/DL
SARS-COV-2 RNA RESP QL NAA+PROBE: NEGATIVE
SODIUM SERPL-SCNC: 139 MMOL/L (ref 133–144)
TRICYCLICS UR QL SCN: NOT DETECTED
TROPONIN I SERPL HS-MCNC: <3 NG/L
TSH SERPL DL<=0.005 MIU/L-ACNC: 1.03 MU/L (ref 0.4–4)
WBC # BLD AUTO: 8.1 10E3/UL (ref 4–11)

## 2022-08-20 PROCEDURE — 80053 COMPREHEN METABOLIC PANEL: CPT | Performed by: PHYSICIAN ASSISTANT

## 2022-08-20 PROCEDURE — 250N000013 HC RX MED GY IP 250 OP 250 PS 637: Performed by: PHYSICIAN ASSISTANT

## 2022-08-20 PROCEDURE — 93005 ELECTROCARDIOGRAM TRACING: CPT | Performed by: FAMILY MEDICINE

## 2022-08-20 PROCEDURE — 99285 EMERGENCY DEPT VISIT HI MDM: CPT | Mod: CS,25 | Performed by: FAMILY MEDICINE

## 2022-08-20 PROCEDURE — 250N000009 HC RX 250: Performed by: PHYSICIAN ASSISTANT

## 2022-08-20 PROCEDURE — 36415 COLL VENOUS BLD VENIPUNCTURE: CPT | Performed by: PHYSICIAN ASSISTANT

## 2022-08-20 PROCEDURE — 84443 ASSAY THYROID STIM HORMONE: CPT | Performed by: PHYSICIAN ASSISTANT

## 2022-08-20 PROCEDURE — 82077 ASSAY SPEC XCP UR&BREATH IA: CPT | Performed by: PHYSICIAN ASSISTANT

## 2022-08-20 PROCEDURE — 87635 SARS-COV-2 COVID-19 AMP PRB: CPT | Performed by: PHYSICIAN ASSISTANT

## 2022-08-20 PROCEDURE — 82040 ASSAY OF SERUM ALBUMIN: CPT | Performed by: PHYSICIAN ASSISTANT

## 2022-08-20 PROCEDURE — 80179 DRUG ASSAY SALICYLATE: CPT | Performed by: PHYSICIAN ASSISTANT

## 2022-08-20 PROCEDURE — 90791 PSYCH DIAGNOSTIC EVALUATION: CPT

## 2022-08-20 PROCEDURE — 85025 COMPLETE CBC W/AUTO DIFF WBC: CPT | Performed by: PHYSICIAN ASSISTANT

## 2022-08-20 PROCEDURE — 93010 ELECTROCARDIOGRAM REPORT: CPT | Performed by: FAMILY MEDICINE

## 2022-08-20 PROCEDURE — 84484 ASSAY OF TROPONIN QUANT: CPT | Performed by: PHYSICIAN ASSISTANT

## 2022-08-20 PROCEDURE — 80306 DRUG TEST PRSMV INSTRMNT: CPT | Performed by: PHYSICIAN ASSISTANT

## 2022-08-20 PROCEDURE — 83735 ASSAY OF MAGNESIUM: CPT | Performed by: PHYSICIAN ASSISTANT

## 2022-08-20 PROCEDURE — 80143 DRUG ASSAY ACETAMINOPHEN: CPT | Performed by: PHYSICIAN ASSISTANT

## 2022-08-20 PROCEDURE — 71045 X-RAY EXAM CHEST 1 VIEW: CPT

## 2022-08-20 PROCEDURE — 99285 EMERGENCY DEPT VISIT HI MDM: CPT | Mod: CS | Performed by: FAMILY MEDICINE

## 2022-08-20 PROCEDURE — 81025 URINE PREGNANCY TEST: CPT | Performed by: PHYSICIAN ASSISTANT

## 2022-08-20 PROCEDURE — C9803 HOPD COVID-19 SPEC COLLECT: HCPCS | Performed by: FAMILY MEDICINE

## 2022-08-20 PROCEDURE — 94640 AIRWAY INHALATION TREATMENT: CPT | Performed by: FAMILY MEDICINE

## 2022-08-20 PROCEDURE — 85610 PROTHROMBIN TIME: CPT | Performed by: PHYSICIAN ASSISTANT

## 2022-08-20 RX ORDER — TRAZODONE HYDROCHLORIDE 50 MG/1
100 TABLET, FILM COATED ORAL ONCE
Status: COMPLETED | OUTPATIENT
Start: 2022-08-20 | End: 2022-08-20

## 2022-08-20 RX ORDER — LAMOTRIGINE 100 MG/1
200 TABLET ORAL ONCE
Status: COMPLETED | OUTPATIENT
Start: 2022-08-20 | End: 2022-08-20

## 2022-08-20 RX ORDER — GABAPENTIN 400 MG/1
1200 CAPSULE ORAL ONCE
Status: COMPLETED | OUTPATIENT
Start: 2022-08-20 | End: 2022-08-20

## 2022-08-20 RX ORDER — CLONAZEPAM 0.5 MG/1
0.5 TABLET ORAL ONCE
Status: COMPLETED | OUTPATIENT
Start: 2022-08-20 | End: 2022-08-20

## 2022-08-20 RX ORDER — NICOTINE 21 MG/24HR
1 PATCH, TRANSDERMAL 24 HOURS TRANSDERMAL DAILY
Status: DISCONTINUED | OUTPATIENT
Start: 2022-08-20 | End: 2022-08-21 | Stop reason: HOSPADM

## 2022-08-20 RX ORDER — HYDROXYZINE HYDROCHLORIDE 25 MG/1
50 TABLET, FILM COATED ORAL ONCE
Status: COMPLETED | OUTPATIENT
Start: 2022-08-20 | End: 2022-08-20

## 2022-08-20 RX ORDER — IPRATROPIUM BROMIDE AND ALBUTEROL SULFATE 2.5; .5 MG/3ML; MG/3ML
3 SOLUTION RESPIRATORY (INHALATION) ONCE
Status: COMPLETED | OUTPATIENT
Start: 2022-08-20 | End: 2022-08-20

## 2022-08-20 RX ADMIN — IPRATROPIUM BROMIDE AND ALBUTEROL SULFATE 3 ML: 2.5; .5 SOLUTION RESPIRATORY (INHALATION) at 18:37

## 2022-08-20 RX ADMIN — TRAZODONE HYDROCHLORIDE 100 MG: 50 TABLET ORAL at 22:53

## 2022-08-20 RX ADMIN — Medication 1 PATCH: at 22:53

## 2022-08-20 RX ADMIN — GABAPENTIN 1200 MG: 400 CAPSULE ORAL at 22:53

## 2022-08-20 RX ADMIN — CLONAZEPAM 0.5 MG: 0.5 TABLET ORAL at 18:16

## 2022-08-20 RX ADMIN — LAMOTRIGINE 200 MG: 100 TABLET ORAL at 22:53

## 2022-08-20 RX ADMIN — HYDROXYZINE HYDROCHLORIDE 50 MG: 25 TABLET ORAL at 18:16

## 2022-08-20 ASSESSMENT — ACTIVITIES OF DAILY LIVING (ADL)
ADLS_ACUITY_SCORE: 35

## 2022-08-20 NOTE — CONSULTS
Diagnostic Evaluation Consultation  Crisis Assessment    Patient was assessed: Sharlene  Patient location: [facility]   Was a release of information signed: No. Reason: patient was seen remotely      Referral Data and Chief Complaint  Patient is a 21 year old, who uses she/her pronouns, and presents to the ED with family/friends. Patient is referred to the ED by self. Patient is presenting to the ED for the following concerns: Patient presented to the ED with concerns of Suicidal Ideation and Anxiety..      Informed Consent and Assessment Methods     Patient is under the guardianship of Josefina Smith - mother.  Writer met with patient and spoke with guardian  and explained the crisis assessment process, including applicable information disclosures and limits to confidentiality, assessed understanding of the process, and obtained consent to proceed with the assessment. Patient was observed to be able to participate in the assessment as evidenced by answering interview questions. Assessment methods included conducting a formal interview with patient, review of medical records, collaboration with medical staff, and obtaining relevant collateral information from family and community providers when available..     Over the course of this crisis assessment engaged patient in problem solving and disposition planning. Patient's response to interventions provided the scope of intervention which is inpatient. Patient was feeling hopeless, helpless, and worthless and she did not feel safe going home.  Patient has a plan to end her life before her next birthday which is in 4 days time.     Summary of Patient Situation    Patient spends every other weekend with each parents.  Today patient was at [employer] with her dad when her anxiety level increased. Two healthcare providers at her [employer] encouraged patient's dad to go to the ED. Patient has been struggling with depression and anxiety for a while.  On 7/23/22 patient was admitted  "at [facility] with similar presentation.  Patient felt hopeless and lack of desire to keep living.  Patient reported sleeping too much, having mood swings, and increased SI with active plan to overdose on pills.  Patient has underlying medical issues.  Patient stated \"I want to be dead because I'm not good...\" Patient is on medications and she plans to overdose on pills.          Brief Psychosocial History  { Include:   - Current living situation;   Patient's parents are  and patient spends every other weekend with each parent.  Patient's  mother is her guardian.Patient completed high school via special education.  Patient is disabled and gets sliding scale insulin.  Sh is unemployed.  She spends time with two of her friends.  Patient has family support.        Significant Clinical History    Patient has a long history of mental illness.  Patient denied using drugs or alcohol.  Patient has community providers in [place].  Patient presented with symptoms of depression with suicidal ideation.  Patient has past history of SIB. Patient's dad reported that he used to hide all kitchen knivies when patient is in the house. On 7/23/22 patient was hospitalized at [facility].     Collateral Information  The following information was received from  Pelon Smith whose relationship to the patient is dad. Information was obtained via phone. Their phone number is [phone] and they last had contact with patient on today at the ED 8/20/22.  Patient was brought to the ED by her dad..    What happened today: Patient and her dad were at a community [employer] when patient started having increased anxiety and depression.  Patient statrted talking about death and SI.    What is different about patient's functioning: Patient is disaled and has her mother as the guardian.    Concern about alcohol/drug use: No    What do you think the patient needs: medication evaluation     Has patient made comments about wanting to kill " themselves/others:  Yes Just SI not HI    If d/c is recommended, can they take part in safety/aftercare planning: No    Other information: Patient does not feel safe going home.         Risk Assessment  ESS-6  1.a. Over the past 2 weeks, have you had thoughts of killing yourself? Yes  1.b. Have you ever attempted to kill yourself and, if yes, when did this last happen? No   2. Recent or current suicide plan? Yes plans to overdose on drugs   3. Recent or current intent to act on ideation? Yes  4. Lifetime psychiatric hospitalization? Yes  5. Pattern of excessive substance use? No  6. Current irritability, agitation, or aggression? No  Scoring note: BOTH 1a and 1b must be yes for it to score 1 point, if both are not yes it is zero. All others are 1 point per number. If all questions 1a/1b - 6 are no, risk is negligible. If one of 1a/1b is yes, then risk is mild. If either question 2 or 3, but not both, is yes, then risk is automatically moderate regardless of total score. If both 2 and 3 are yes, risk is automatically high regardless of total score.      Score: 3, high risk      Does the patient have access to lethal means? No     Does the patient engage in non-suicidal self-injurious behavior (NSSI/SIB)? no. However, patient has a history of SIB via cutting. Pt has not engaged in SIB since two months     Does the patient have thoughts of harming others? No     Is the patient engaging in sexually inappropriate behavior?  no        Current Substance Abuse     Is there recent substance abuse? no     Was a urine drug screen or blood alcohol level obtained: No       Mental Status Exam     Affect: Flat   Appearance: Appropriate    Attention Span/Concentration: Attentive  Eye Contact: Variable   Fund of Knowledge: Delayed    Language /Speech Content: Fluent   Language /Speech Volume: Normal    Language /Speech Rate/Productions: Normal    Recent Memory: Variable   Remote Memory: Variable   Mood: Sad    Orientation to Person:  Yes    Orientation to Place: Yes   Orientation to Time of Day: Yes    Orientation to Date: Yes    Situation (Do they understand why they are here?): Yes    Psychomotor Behavior: Normal    Thought Content: Suicidal   Thought Form: Intact      History of commitment: No           Medication    Psychotropic medications: Yes. Pt is currently taking see med list. Medication compliant: Yes. Recent medication changes: No  Medication changes made in the last two weeks: No       Current Care Team    Primary Care Provider: Yes. Name: Unknown. Location: [facility]. Date of last visit: .. Frequency: as needed. Perceived helpfulness: ..  Psychiatrist: Yes. Name: Marnie. Location: [facility]. Date of last visit: .. Frequency: as needed. Perceived helpfulness: ..  Therapist: No  : Yes. Name: Carmen Gerardo. Location: [facility]. Date of last visit: .. Frequency: as needed. Perceived helpfulness: ..     CTSS or ARMHS: No  ACT Team: No  Other: No      Diagnosis    296.32 (F33.1) Major Depressive Disorder, Recurrent Episode, Moderate _ and With anxious distress   300.02 (F41.1) Generalized Anxiety Disorder - by history   296.32 (F33.1) Major Depressive Disorder, Recurrent Episode, Moderate _ and With anxious distress - by history       Clinical Summary and Substantiation of Recommendations    Patient who has prior history of depression and anxiety presented to the ED with low self-esteem, poor sleeping patterns, low motivation, worthlessness and suicidal ideation with a plan to die before her next birthday which is in 4 days time.  Patient could not indicate the etiology of her circumstances.  She spends every other week with each parents.  Patient is disabled and her mother is her guardian.  Writer spoke with  Both parents separately.  Patient's father who brought her to the ED indicated the need for medication evaluation.  Patient was at the ED on 7/23/22.  Patient did not contract for  safety and the final disposition is inpatient. Patent has intent to overdose on pills.  Disposition    Recommended disposition: Inpatient Mental Health       Reviewed case and recommendations with attending provider. Attending Name: Dr. Johnson       Attending concurs with disposition: Yes       Patient concurs with disposition: Yes       Guardian concurs with disposition: Yes      Final disposition: Inpatient mental health .     Inpatient Details (if applicable):  Is patient admitted voluntarily:Yes      Patient aware of potential for transfer if there is not appropriate placement? Yes       Patient is willing to travel outside of the [facility] for placement? Yes      Behavioral Intake Notified? Yes: Date: 8/20/22 Time: 6:50 pm.     Outpatient Details (if applicable):   Aftercare plan and appointments placed in the AVS and provided to patient: No. Rationale: patient is admitting    Was lethal means counseling provided as a part of aftercare planning? No;       Assessment Details    Patient interview started at: 5:45 pm and completed at: 6:45 pm.     Total duration spent on the patient case in minutes: 1.0 hrs      CPT code(s) utilized: 98594 - Psychotherapy for Crisis - 60 (30-74*) min       CECY Perez, MSW, CECY  [facility] - Triage & Transition Services

## 2022-08-20 NOTE — ED TRIAGE NOTES
"Patient here with constant suicidal thoughts and plan. Told her dad that she did not feel safe to be left alone tonight \"even if he removed all the knives.\" States she wants something to sedate her so that she doesn't have these constant thoughts.      Triage Assessment     Row Name 08/20/22 9841       Triage Assessment (Adult)    Airway WDL WDL       Respiratory WDL    Respiratory WDL WDL       Skin Circulation/Temperature WDL    Skin Circulation/Temperature WDL WDL              "

## 2022-08-20 NOTE — TELEPHONE ENCOUNTER
S: Sakshi, [facility] ED, 21/F, SI w plan     B: Pt reports SI w plan to overdose, reports she doesn't want to be alive for her birthday   Hx of SA   Pt denies HI, aggression, psychosis   Dx of schizophrenia, ASD     Medically cleared, eating, drinking, ambulating indep  Patient cleared and ready for behavioral bed placement: Yes   covid neg     A: Voluntary - mom is legal guardian   [facility] only     R: DANYELL/Diane/Naegele     Potential shared bed avail on 4a   717pm - Tarsha, on call provider, paged   731pm - Tarsha accepts   Pt placed in queue   734pm - unit notified, expressed to have the RN call for report when they are ready   737pm - ED notified     [facility] ED: [phone]  Station 4A: [phone]

## 2022-08-20 NOTE — ED PROVIDER NOTES
"  History     Chief Complaint   Patient presents with     Suicidal     HPI  Yasmine Smith is a 21 year old female who presents with her father for evaluation of suicidal ideation and anxiety.  Patient goes to her father's every other weekend.  They were at [facility] when she started to feel very anxious.  Father states that when he came out of a meeting, she was in a state of panic.  2 nurses at the [facility] helped her calm down, but she told her father that she needed to come into the ED.  Father states that this anxiety is much different than typical.  He states that her thought process is very deep and dark.  Father states that the patient told him, \"you can take all of the knives away and take my meds away, but if you go to the bathroom I will be gone.  \"This was very concerning for her father.  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  Apparently they do have accommodations for her sensory issues.    Patient states \"I am not okay \".  She states, \"all of the sudden I fell off of the deep and \".  She started to experience some dyspnea and wheezing.  Reports a history of asthma and her inhaler is apparently .  She states, \"I do not feel like myself \".  Reports return of suicidal thoughts since last evening.  She has a plan of finding a private place so that she could not be found and then she plans to overdose on her own medication for which she states she has access to.  She states, \"it never felt as real as today \"when referencing her suicidal thoughts.  She could not sleep last night.  She states that she has emotions of anger and despair.  She denies taking any street drugs or drinking any alcohol today.  She has not taken any of her as needed psychiatric medications.  She denies missing recent doses.  Denies any possibility of pregnancy.  Denies chest pain or palpitations.  No fever, chills, nausea, vomiting, rhinorrhea, congestion, sore " throat, or change in taste/smell sensation.        Excerpt from her ED visit on 7/23/22:    ED Course         Suicide assessment completed by mental health (D.EDavidC., LCSW, etc.)     Procedures                Critical Care time:  none                    Results for orders placed or performed during the hospital encounter of 07/23/22 (from the past 24 hour(s))   CBC with platelets differential     Narrative     The following orders were created for panel order CBC with platelets differential.  Procedure                               Abnormality         Status                     ---------                               -----------         ------                     CBC with platelets and d...[[id number]]                      Final result                  Please view results for these tests on the individual orders.   Basic metabolic panel   Result Value Ref Range     Sodium 139 133 - 144 mmol/L     Potassium 3.8 3.4 - 5.3 mmol/L     Chloride 109 94 - 109 mmol/L     Carbon Dioxide (CO2) 25 20 - 32 mmol/L     Anion Gap 5 3 - 14 mmol/L     Urea Nitrogen 11 7 - 30 mg/dL     Creatinine 0.90 0.52 - 1.04 mg/dL     Calcium 8.8 8.5 - 10.1 mg/dL     Glucose 100 (H) 70 - 99 mg/dL     GFR Estimate >90 >60 mL/min/1.73m2   Ethyl Alcohol Level   Result Value Ref Range     Alcohol ethyl 0.01 <=0.01 g/dL   Salicylate level   Result Value Ref Range     Salicylate 3 <20 mg/dL   Acetaminophen level   Result Value Ref Range     Acetaminophen <2 (L) 10 - 30 mg/L   CBC with platelets and differential   Result Value Ref Range     WBC Count 8.3 4.0 - 11.0 10e3/uL     RBC Count 4.86 3.80 - 5.20 10e6/uL     Hemoglobin 14.7 11.7 - 15.7 g/dL     Hematocrit 43.9 35.0 - 47.0 %     MCV 90 78 - 100 fL     MCH 30.2 26.5 - 33.0 pg     MCHC 33.5 31.5 - 36.5 g/dL     RDW 12.5 10.0 - 15.0 %     Platelet Count 213 150 - 450 10e3/uL     % Neutrophils 53 %     % Lymphocytes 38 %     % Monocytes 5 %     % Eosinophils 3 %     % Basophils 1 %     % Immature  Granulocytes 0 %     NRBCs per 100 WBC 0 <1 /100     Absolute Neutrophils 4.3 1.6 - 8.3 10e3/uL     Absolute Lymphocytes 3.2 0.8 - 5.3 10e3/uL     Absolute Monocytes 0.5 0.0 - 1.3 10e3/uL     Absolute Eosinophils 0.3 0.0 - 0.7 10e3/uL     Absolute Basophils 0.0 0.0 - 0.2 10e3/uL     Absolute Immature Granulocytes 0.0 <=0.4 10e3/uL     Absolute NRBCs 0.0 10e3/uL   HCG qualitative urine   Result Value Ref Range     hCG Urine Qualitative Negative Negative   UA with Microscopic reflex to Culture     Specimen: Urine, Clean Catch   Result Value Ref Range     Color Urine Yellow Colorless, Straw, Light Yellow, Yellow     Appearance Urine Clear Clear     Glucose Urine Negative Negative mg/dL     Bilirubin Urine Negative Negative     Ketones Urine Negative Negative mg/dL     Specific Gravity Urine 1.010 1.003 - 1.035     Blood Urine Moderate (A) Negative     pH Urine 6.5 5.0 - 7.0     Protein Albumin Urine Negative Negative mg/dL     Urobilinogen Urine Normal Normal, 2.0 mg/dL     Nitrite Urine Negative Negative     Leukocyte Esterase Urine Negative Negative     Bacteria Urine Few (A) None Seen /HPF     Mucus Urine Present (A) None Seen /LPF     RBC Urine 11 (H) <=2 /HPF     WBC Urine 2 <=5 /HPF     Squamous Epithelials Urine 4 (H) <=1 /HPF     Narrative     Urine Culture not indicated   Urine Drugs of Abuse Screen     Narrative     The following orders were created for panel order Urine Drugs of Abuse Screen.  Procedure                               Abnormality         Status                     ---------                               -----------         ------                     Urine Drugs of Abuse Scr...[[id number]]  Normal              Final result                  Please view results for these tests on the individual orders.   Urine Drugs of Abuse Screen Panel 13   Result Value Ref Range     Cannabinoids (92-ygz-2-carboxy-9-THC) Not Detected Not Detected, Indeterminate     Phencyclidine Not Detected Not Detected,  Indeterminate     Cocaine (Benzoylecgonine) Not Detected Not Detected, Indeterminate     Methamphetamine (d-Methamphetamine) Not Detected Not Detected, Indeterminate     Opiates (Morphine) Not Detected Not Detected, Indeterminate     Amphetamine (d-Amphetamine) Not Detected Not Detected, Indeterminate     Benzodiazepines (Nordiazepam) Not Detected Not Detected, Indeterminate     Tricyclic Antidepressants (Desipramine) Not Detected Not Detected, Indeterminate     Methadone Not Detected Not Detected, Indeterminate     Barbiturates (Butalbital) Not Detected Not Detected, Indeterminate     Oxycodone Not Detected Not Detected, Indeterminate     Propoxyphene (Norpropoxyphene) Not Detected Not Detected, Indeterminate     Buprenorphine Not Detected Not Detected, Indeterminate         Medications - No data to display     Assessments & Plan (with Medical Decision Making)  Yasmine is a 21-year-old female with past medical history of schizophrenia, depression, autism spectrum disorder, Fragile X syndrome, presenting to the emergency room with dad over concerns of increased depression and suicidal ideation.  See history of focused physical exam as above  Obese 21-year-old female in no acute distress, vital signs are stable other than an elevated blood pressure of 155/100.  She is afebrile.  Do not see any marks of self-harm.  She denies ingesting any over-the-counter medications or prescription medications in an attempt to harm her self.  We will have her assessed by behavioral health to determine if she needs to be admitted for mental health concerns.  Will initiate lab work and swab for COVID in anticipation of possible placement.  Ange HILLIARD called and spoke with the patient as well as with her dad who remains in the department.  Made multiple attempts to get a hold of mom, who is legal guardian and whom Yasmine lives with.  Was unable to contact her.  Dad was also unable to get mom on the phone.  Ange does not  think that Yasmine meets inpatient criteria, she has generalized thoughts of self-harm but does not have any specific plan to act on this.  Would recommend that mom locks up any medication or sharp objects, but Yasmine does has appropriate follow-up with psychiatry and has outpatient resources.  Dad understands this, but ultimately we need to make sure mom is on the same page as she is the legal guardian.  1430 Spoke with mom, Josefina, who is agreeable to work up and safety plan.  Ange also was able to get a hold of Mckenna by phone afterwards and reiterated the same information.  She called back and stated that we are on the same page and Mckenna is agreeable to accept Yasmine back into her care.  She agrees with the recommendations and will follow up with psychiatry.  Yasmine was becoming upset as she wanted to leave and smoke, and after explaining need to contact mom and that is what we are waiting for, she was easily calmed.  All questions were answered for both her and dad.  Resources were provided for crisis hotline as well as follow-up recommendations.  Return at any time if symptoms worsen or any new concerns develop.  Discharged in no distress      I have reviewed the nursing notes.     I have reviewed the findings, diagnosis, plan and need for follow up with the patient.            New Prescriptions     No medications on file         Final diagnoses:   Schizophrenia, unspecified type (H)   Depression, unspecified depression type   Verbalizes suicidal thoughts         7/23/2022   [facility] EMERGENCY DEPT     Lia Sesayanna Tierney,   07/23/22 0253                Allergies:  Allergies   Allergen Reactions     Aripiprazole Other (See Comments)     Restlessness     Banana Swelling     Throat swells/ puffy     Food Swelling     Cantalope, causes swelling/puffiness of throat       Mirtazapine Other (See Comments)     Weight gain     Olanzapine Other (See Comments)     Weight gain  And  irritability     Risperidone Other (See Comments)     Breast tenderness       Problem List:    Patient Active Problem List    Diagnosis Date Noted     Pyelonephritis, acute 08/10/2013     Priority: High     Schizophrenia (H) 05/01/2022     Priority: Medium     Headache 04/06/2018     Priority: Medium     Closed fracture of metacarpal bone 04/06/2018     Priority: Medium     Abnormal magnetic resonance imaging of bone 04/06/2018     Priority: Medium     Hypokalemia 08/10/2013     Priority: Medium     Nausea & vomiting 08/10/2013     Priority: Medium        Past Medical History:    Past Medical History:   Diagnosis Date     Anxiety disorder      Autism      Bipolar disorder (H)        Past Surgical History:    Past Surgical History:   Procedure Laterality Date     ENT SURGERY      tonsils, adenoids, turbulance       Family History:    History reviewed. No pertinent family history.    Social History:  Marital Status:  Single [1]  Social History     Tobacco Use     Smoking status: Current Every Day Smoker     Packs/day: 1.00     Smokeless tobacco: Never Used   Substance Use Topics     Alcohol use: Yes     Comment: rarely     Drug use: Yes     Types: Marijuana        Medications:    gabapentin (NEURONTIN) 600 MG tablet  traZODone (DESYREL) 50 MG tablet  acetaminophen (TYLENOL) 325 MG tablet  cetirizine (ZYRTEC) 10 MG tablet  cholecalciferol (VITAMIN D3) 1250 mcg (20570 units) capsule  clonazePAM (KLONOPIN) 0.5 MG tablet  divalproex sodium extended-release (DEPAKOTE ER) 500 MG 24 hr tablet  etonogestrel (NEXPLANON) 68 MG IMPL  hydrOXYzine (VISTARIL) 50 MG capsule  ibuprofen (ADVIL/MOTRIN) 800 MG tablet  lamoTRIgine (LAMICTAL) 200 MG tablet  lamoTRIgine (LAMICTAL) 200 MG tablet  levothyroxine (SYNTHROID/LEVOTHROID) 50 MCG tablet  melatonin 3 MG tablet  omeprazole (PRILOSEC) 40 MG DR capsule  ondansetron (ZOFRAN) 8 MG tablet  polyethylene glycol (MIRALAX) 17 g packet  senna (SENOKOT) 8.6 MG tablet  sertraline (ZOLOFT) 50 MG  tablet  vitamin D3 (CHOLECALCIFEROL) 1.25 MG (01785 UT) capsule  ziprasidone (GEODON) 80 MG capsule          Review of Systems   All other systems reviewed and are negative.      Physical Exam   BP: (!) 128/91  Pulse: 73  Temp: 99.1  F (37.3  C)  Resp: 20  SpO2: 95 %      Physical Exam  Vitals and nursing note reviewed.   Constitutional:       General: She is not in acute distress.     Appearance: She is not diaphoretic.   HENT:      Head: Normocephalic and atraumatic.      Right Ear: Tympanic membrane, ear canal and external ear normal.      Left Ear: Tympanic membrane, ear canal and external ear normal.      Nose: Nose normal. No congestion or rhinorrhea.      Mouth/Throat:      Mouth: Mucous membranes are moist.      Pharynx: No oropharyngeal exudate.   Eyes:      General: No scleral icterus.        Right eye: No discharge.         Left eye: No discharge.      Conjunctiva/sclera: Conjunctivae normal.      Pupils: Pupils are equal, round, and reactive to light.   Neck:      Thyroid: No thyromegaly.   Cardiovascular:      Rate and Rhythm: Normal rate and regular rhythm.      Heart sounds: Normal heart sounds. No murmur heard.  Pulmonary:      Effort: Pulmonary effort is normal. No respiratory distress.      Breath sounds: Normal breath sounds. No wheezing or rales.   Chest:      Chest wall: No tenderness.   Abdominal:      General: Bowel sounds are normal. There is no distension.      Palpations: Abdomen is soft. There is no mass.      Tenderness: There is no abdominal tenderness. There is no guarding or rebound.   Musculoskeletal:         General: No tenderness or deformity. Normal range of motion.      Cervical back: Normal range of motion and neck supple. No rigidity or tenderness.   Lymphadenopathy:      Cervical: No cervical adenopathy.   Skin:     General: Skin is warm and dry.      Capillary Refill: Capillary refill takes less than 2 seconds.      Findings: No erythema or rash.   Neurological:      Mental  Status: She is alert and oriented to person, place, and time.      GCS: GCS eye subscore is 4. GCS verbal subscore is 5. GCS motor subscore is 6.      Cranial Nerves: Cranial nerves 2-12 are intact. No cranial nerve deficit.      Sensory: Sensation is intact.      Motor: Motor function is intact.      Coordination: Coordination is intact.      Gait: Gait is intact.   Psychiatric:         Attention and Perception: Attention and perception normal. She does not perceive auditory or visual hallucinations.         Mood and Affect: Mood is anxious and depressed. Affect is blunt and flat.         Speech: Speech normal.         Behavior: Behavior normal. Behavior is not agitated or aggressive. Behavior is cooperative.         Thought Content: Thought content is not paranoid or delusional. Thought content includes suicidal ideation. Thought content does not include homicidal ideation. Thought content includes suicidal plan. Thought content does not include homicidal plan.         Cognition and Memory: Cognition and memory normal.         Judgment: Judgment is impulsive.         ED Course     Mental Health Risk Assessment      PSS-3    Date and Time Over the past 2 weeks have you felt down, depressed, or hopeless? Over the past 2 weeks have you had thoughts of killing yourself? Have you ever attempted to kill yourself? When did this last happen? User   08/20/22 1637 yes yes no -- MAG      C-SSRS ([place])    Date and Time Q1 Wished to be Dead (Past Month) Q2 Suicidal Thoughts (Past Month) Q3 Suicidal Thought Method Q4 Suicidal Intent without Specific Plan Q5 Suicide Intent with Specific Plan Q6 Suicide Behavior (Lifetime) Within the Past 3 Months? RETIRED: Level of Risk per Screen Screening Not Complete User   08/20/22 1712 yes yes yes yes no no -- -- -- ATW   08/20/22 1637 yes yes yes yes no no -- -- -- MAG              Suicide assessment completed by mental health (D.E.C., LCSW, etc.)        6:18 PM - I spoke with the DEC   and she recommends inpatient evaluation given the seriousness of the suicidal ideation and the plan.  She will look into bed availability and call us back.  Patient also just reported to the nurse that she has having mid chest discomfort.  This certainly could be anxiety induced, but we will check an EKG and troponin level.  We will also give her a dose of p.o. hydroxyzine as an additive effect on top of the clonazepam that was already given.                    Procedures                 EKG Interpretation:      EKG Number: 1  Interpreted by Campos Johnson PA-C  Symptoms at time of EKG: chest discomfort and dyspnea.   Rhythm: Normal sinus   Rate: Normal  Axis: Normal  Ectopy: None  Conduction: Normal  ST Segments/ T Waves: No ST-T wave changes and No acute ischemic changes  Q Waves: None  Comparison to prior: No old EKG available    Clinical Impression: normal EKG           Critical Care time:  none               Results for orders placed or performed during the hospital encounter of 08/20/22 (from the past 24 hour(s))   CBC with platelets differential    Narrative    The following orders were created for panel order CBC with platelets differential.  Procedure                               Abnormality         Status                     ---------                               -----------         ------                     CBC with platelets and d...[[id number]]                      Final result                 Please view results for these tests on the individual orders.   Comprehensive metabolic panel   Result Value Ref Range    Sodium 139 133 - 144 mmol/L    Potassium 4.0 3.4 - 5.3 mmol/L    Chloride 107 94 - 109 mmol/L    Carbon Dioxide (CO2) 26 20 - 32 mmol/L    Anion Gap 6 3 - 14 mmol/L    Urea Nitrogen 9 7 - 30 mg/dL    Creatinine 0.75 0.52 - 1.04 mg/dL    Calcium 8.7 8.5 - 10.1 mg/dL    Glucose 115 (H) 70 - 99 mg/dL    Alkaline Phosphatase 45 40 - 150 U/L    AST 14 0 - 45 U/L    ALT 24 0 - 50 U/L     Protein Total 6.5 (L) 6.8 - 8.8 g/dL    Albumin 3.1 (L) 3.4 - 5.0 g/dL    Bilirubin Total 0.2 0.2 - 1.3 mg/dL    GFR Estimate >90 >60 mL/min/1.73m2   INR   Result Value Ref Range    INR 0.99 0.85 - 1.15   Alcohol ethyl   Result Value Ref Range    Alcohol ethyl <0.01 <=0.01 g/dL   Magnesium   Result Value Ref Range    Magnesium 2.0 1.6 - 2.3 mg/dL   Salicylate level   Result Value Ref Range    Salicylate 5 <20 mg/dL   Acetaminophen level   Result Value Ref Range    Acetaminophen <2 (L) 10 - 30 mg/L   TSH   Result Value Ref Range    TSH 1.03 0.40 - 4.00 mU/L   CBC with platelets and differential   Result Value Ref Range    WBC Count 8.1 4.0 - 11.0 10e3/uL    RBC Count 4.76 3.80 - 5.20 10e6/uL    Hemoglobin 14.3 11.7 - 15.7 g/dL    Hematocrit 43.0 35.0 - 47.0 %    MCV 90 78 - 100 fL    MCH 30.0 26.5 - 33.0 pg    MCHC 33.3 31.5 - 36.5 g/dL    RDW 13.2 10.0 - 15.0 %    Platelet Count 197 150 - 450 10e3/uL    % Neutrophils 49 %    % Lymphocytes 38 %    % Monocytes 9 %    % Eosinophils 3 %    % Basophils 1 %    % Immature Granulocytes 0 %    NRBCs per 100 WBC 0 <1 /100    Absolute Neutrophils 4.0 1.6 - 8.3 10e3/uL    Absolute Lymphocytes 3.1 0.8 - 5.3 10e3/uL    Absolute Monocytes 0.7 0.0 - 1.3 10e3/uL    Absolute Eosinophils 0.2 0.0 - 0.7 10e3/uL    Absolute Basophils 0.1 0.0 - 0.2 10e3/uL    Absolute Immature Granulocytes 0.0 <=0.4 10e3/uL    Absolute NRBCs 0.0 10e3/uL   Troponin I   Result Value Ref Range    Troponin I High Sensitivity <3 <54 ng/L   Asymptomatic COVID-19 Virus (Coronavirus) by PCR Nose    Specimen: Nose; Swab   Result Value Ref Range    SARS CoV2 PCR Negative Negative    Narrative    Testing was performed using the john  SARS-CoV-2 & Influenza A/B Assay on the john  Stephanie  System.  This test should be ordered for the detection of SARS-COV-2 in individuals who meet SARS-CoV-2 clinical and/or epidemiological criteria. Test performance is unknown in asymptomatic patients.  This test is for in vitro  diagnostic use under the FDA EUA for laboratories certified under CLIA to perform moderate and/or high complexity testing. This test has not been FDA cleared or approved.  A negative test does not rule out the presence of PCR inhibitors in the specimen or target RNA in concentration below the limit of detection for the assay. The possibility of a false negative should be considered if the patient's recent exposure or clinical presentation suggests COVID-19.  [facility] are certified under the Clinical Laboratory Improvement Amendments of 1988 (CLIA-88) as qualified to perform moderate and/or high complexity laboratory testing.   HCG qualitative urine   Result Value Ref Range    hCG Urine Qualitative Negative Negative   Urine Drugs of Abuse Screen    Narrative    The following orders were created for panel order Urine Drugs of Abuse Screen.  Procedure                               Abnormality         Status                     ---------                               -----------         ------                     Urine Drugs of Abuse Scr...[[id number]]  Abnormal            Final result                 Please view results for these tests on the individual orders.   Urine Drugs of Abuse Screen Panel 13   Result Value Ref Range    Cannabinoids (20-pvq-3-carboxy-9-THC) Detected (A) Not Detected, Indeterminate    Phencyclidine Not Detected Not Detected, Indeterminate    Cocaine (Benzoylecgonine) Not Detected Not Detected, Indeterminate    Methamphetamine (d-Methamphetamine) Not Detected Not Detected, Indeterminate    Opiates (Morphine) Not Detected Not Detected, Indeterminate    Amphetamine (d-Amphetamine) Not Detected Not Detected, Indeterminate    Benzodiazepines (Nordiazepam) Not Detected Not Detected, Indeterminate    Tricyclic Antidepressants (Desipramine) Not Detected Not Detected, Indeterminate    Methadone Not Detected Not Detected, Indeterminate    Barbiturates (Butalbital) Not Detected Not  Detected, Indeterminate    Oxycodone Not Detected Not Detected, Indeterminate    Propoxyphene (Norpropoxyphene) Not Detected Not Detected, Indeterminate    Buprenorphine Not Detected Not Detected, Indeterminate   XR Chest Port 1 View    Narrative    EXAM: XR CHEST PORT 1 VIEW  LOCATION: [facility]  DATE/TIME: 8/20/2022 5:46 PM    INDICATION: Dyspnea and wheezing.  COMPARISON: None.      Impression    IMPRESSION: Negative chest.       Medications   clonazePAM (klonoPIN) tablet 0.5 mg (0.5 mg Oral Given 8/20/22 1816)   hydrOXYzine (ATARAX) tablet 50 mg (50 mg Oral Given 8/20/22 1816)   ipratropium - albuterol 0.5 mg/2.5 mg/3 mL (DUONEB) neb solution 3 mL (3 mLs Nebulization Given 8/20/22 1837)       Assessments & Plan (with Medical Decision Making)     Suicidal ideation  Anxiety  Cough  Asthma     21 year old female with a prior history of schizophrenia, bipolar disorder, and anxiety who presents for evaluation of increased suicidal ideation since last night.  She feels that her ideation is much worse than usual.  She has a plan to find a private place and OD on her own psychiatric medication.  This morning she started experiencing dyspnea and wheezing which she thinks is related to her anxiety and asthma.  She feels extremely anxious at this point.  Denies using any EtOH or street drugs today.  See HPI above for details.  On exam blood pressure 128/91, temperature 99.1, pulse 73, respiration 20, oxygen saturation 95% on room air.  Obese female who appears quite anxious.  Mood is depressed.  Affect is flat and blunted.  Neurologically intact.  Physical exam is otherwise normal.  Cardiopulmonary exam normal.  Health officer hold placed after my evaluation.  I am concerned about her increased suicidal ideation and a plan.  Her father is with her for support.  IV is established.  Laboratory levels display no acute abnormalities.  CBC with a normal white blood cell count of 8100.  Normal  differential.  Comprehensive metabolic panel normal other than an elevated nonfasting glucose of 115.  INR, EtOH, magnesium, salicylate, acetaminophen, and TSH normal.  COVID-19 swab negative.  Troponin undetectable.  hCG negative.  Urine tox screen positive for cannabinoids but otherwise negative.  Chest x-ray without infiltrate.  Patient did have some chest discomfort during her evaluation.  EKG was performed and displayed no acute ST or T wave elevation.  She was given clonazepam and hydroxyzine for anxiety with good improvement.  Her chest pain resolved.  She was also given a DuoNeb for wheezing and dyspnea, which also resolved the symptoms.  Therefore, I do not think this chest pain represents acute coronary syndrome.  Resolved with anxiety and asthma treatment.  Patient was resting comfortably at the end of her evaluation.  She spoke with the DEC , who recommended inpatient evaluation.  The patient is relieved by this, as she feels that she needs help.  Patient is medically clear for transfer at this time.  Dr. Contreras, psychiatrist at [facility], has kindly agreed to accept care of this patient.    Dr. Nunn, ED MD, was involved with her care as well     I have reviewed the nursing notes.    I have reviewed the findings, diagnosis, plan and need for follow up with the patient.       New Prescriptions    No medications on file       Final diagnoses:   Suicidal ideation   Anxiety   Cough   Asthma     Disclaimer: This note consists of symbols derived from keyboarding, dictation and/or voice recognition software. As a result, there may be errors in the script that have gone undetected. Please consider this when interpreting information found in this chart.      8/20/2022   [facility] EMERGENCY DEPT     Campos Johnson PA-C  08/20/22 2230       Campos Johnson PA-C  08/20/22 2238

## 2022-08-21 ENCOUNTER — HOSPITAL ENCOUNTER (INPATIENT)
Facility: CLINIC | Age: 22
LOS: 4 days | Discharge: HOME | End: 2022-08-25
Attending: PSYCHIATRY & NEUROLOGY | Admitting: PSYCHIATRY & NEUROLOGY
Payer: MEDICAID

## 2022-08-21 DIAGNOSIS — F32.1 MAJOR DEPRESSIVE DISORDER, SINGLE EPISODE, MODERATE (H): Primary | ICD-10-CM

## 2022-08-21 PROBLEM — R45.851 SUICIDAL IDEATION: Status: ACTIVE | Noted: 2022-08-21

## 2022-08-21 LAB
CHOLEST SERPL-MCNC: 170 MG/DL
HDLC SERPL-MCNC: 38 MG/DL
LDLC SERPL CALC-MCNC: 97 MG/DL
NONHDLC SERPL-MCNC: 132 MG/DL
TRIGL SERPL-MCNC: 174 MG/DL

## 2022-08-21 PROCEDURE — 124N000002 HC R&B MH UMMC

## 2022-08-21 PROCEDURE — 99223 1ST HOSP IP/OBS HIGH 75: CPT | Mod: AI | Performed by: PSYCHIATRY & NEUROLOGY

## 2022-08-21 PROCEDURE — 250N000013 HC RX MED GY IP 250 OP 250 PS 637: Performed by: PSYCHIATRY & NEUROLOGY

## 2022-08-21 PROCEDURE — 36415 COLL VENOUS BLD VENIPUNCTURE: CPT | Performed by: PSYCHIATRY & NEUROLOGY

## 2022-08-21 PROCEDURE — 80061 LIPID PANEL: CPT | Performed by: PSYCHIATRY & NEUROLOGY

## 2022-08-21 RX ORDER — CETIRIZINE HYDROCHLORIDE 10 MG/1
10 TABLET ORAL DAILY
Status: DISCONTINUED | OUTPATIENT
Start: 2022-08-21 | End: 2022-08-25 | Stop reason: HOSPADM

## 2022-08-21 RX ORDER — SUCRALFATE 1 G/1
1 TABLET ORAL
Status: DISCONTINUED | OUTPATIENT
Start: 2022-08-21 | End: 2022-08-25 | Stop reason: HOSPADM

## 2022-08-21 RX ORDER — LEVOTHYROXINE SODIUM 50 UG/1
50 TABLET ORAL EVERY EVENING
Status: DISCONTINUED | OUTPATIENT
Start: 2022-08-21 | End: 2022-08-25 | Stop reason: HOSPADM

## 2022-08-21 RX ORDER — ZIPRASIDONE MESYLATE 20 MG/ML
20 INJECTION, POWDER, LYOPHILIZED, FOR SOLUTION INTRAMUSCULAR EVERY 4 HOURS PRN
Status: DISCONTINUED | OUTPATIENT
Start: 2022-08-21 | End: 2022-08-25 | Stop reason: HOSPADM

## 2022-08-21 RX ORDER — TRAZODONE HYDROCHLORIDE 50 MG/1
50 TABLET, FILM COATED ORAL
Status: DISCONTINUED | OUTPATIENT
Start: 2022-08-21 | End: 2022-08-21

## 2022-08-21 RX ORDER — GABAPENTIN 300 MG/1
1200 CAPSULE ORAL AT BEDTIME
Status: DISCONTINUED | OUTPATIENT
Start: 2022-08-21 | End: 2022-08-25 | Stop reason: HOSPADM

## 2022-08-21 RX ORDER — GABAPENTIN 300 MG/1
600 CAPSULE ORAL DAILY PRN
Status: DISCONTINUED | OUTPATIENT
Start: 2022-08-21 | End: 2022-08-25 | Stop reason: HOSPADM

## 2022-08-21 RX ORDER — SUCRALFATE 1 G/1
1 TABLET ORAL AT BEDTIME
Status: DISCONTINUED | OUTPATIENT
Start: 2022-08-21 | End: 2022-08-25 | Stop reason: HOSPADM

## 2022-08-21 RX ORDER — ACETAMINOPHEN 325 MG/1
650 TABLET ORAL EVERY 4 HOURS PRN
Status: DISCONTINUED | OUTPATIENT
Start: 2022-08-21 | End: 2022-08-21

## 2022-08-21 RX ORDER — ACETAMINOPHEN 325 MG/1
975 TABLET ORAL EVERY 4 HOURS PRN
Status: DISCONTINUED | OUTPATIENT
Start: 2022-08-21 | End: 2022-08-25 | Stop reason: HOSPADM

## 2022-08-21 RX ORDER — AMOXICILLIN 250 MG
1 CAPSULE ORAL 2 TIMES DAILY PRN
Status: DISCONTINUED | OUTPATIENT
Start: 2022-08-21 | End: 2022-08-25 | Stop reason: HOSPADM

## 2022-08-21 RX ORDER — HYDROXYZINE HYDROCHLORIDE 50 MG/1
50 TABLET, FILM COATED ORAL EVERY 8 HOURS PRN
Status: DISCONTINUED | OUTPATIENT
Start: 2022-08-21 | End: 2022-08-25 | Stop reason: HOSPADM

## 2022-08-21 RX ORDER — TRAZODONE HYDROCHLORIDE 100 MG/1
100 TABLET ORAL AT BEDTIME
Status: DISCONTINUED | OUTPATIENT
Start: 2022-08-21 | End: 2022-08-25 | Stop reason: HOSPADM

## 2022-08-21 RX ORDER — ZIPRASIDONE HYDROCHLORIDE 40 MG/1
80 CAPSULE ORAL 2 TIMES DAILY WITH MEALS
Status: DISCONTINUED | OUTPATIENT
Start: 2022-08-21 | End: 2022-08-25 | Stop reason: HOSPADM

## 2022-08-21 RX ORDER — ZIPRASIDONE HYDROCHLORIDE 20 MG/1
20 CAPSULE ORAL EVERY 6 HOURS PRN
Status: DISCONTINUED | OUTPATIENT
Start: 2022-08-21 | End: 2022-08-25 | Stop reason: HOSPADM

## 2022-08-21 RX ORDER — CLONAZEPAM 0.5 MG/1
1 TABLET ORAL 2 TIMES DAILY PRN
Status: DISCONTINUED | OUTPATIENT
Start: 2022-08-21 | End: 2022-08-25 | Stop reason: HOSPADM

## 2022-08-21 RX ORDER — CHOLECALCIFEROL (VITAMIN D3) 1250 MCG
1250 CAPSULE ORAL
Status: DISCONTINUED | OUTPATIENT
Start: 2022-08-21 | End: 2022-08-25 | Stop reason: HOSPADM

## 2022-08-21 RX ORDER — ONDANSETRON 4 MG/1
8 TABLET, ORALLY DISINTEGRATING ORAL EVERY 8 HOURS PRN
Status: DISCONTINUED | OUTPATIENT
Start: 2022-08-21 | End: 2022-08-25 | Stop reason: HOSPADM

## 2022-08-21 RX ORDER — DIVALPROEX SODIUM 500 MG/1
1500 TABLET, EXTENDED RELEASE ORAL AT BEDTIME
Status: DISCONTINUED | OUTPATIENT
Start: 2022-08-21 | End: 2022-08-25 | Stop reason: HOSPADM

## 2022-08-21 RX ORDER — TRAZODONE HYDROCHLORIDE 50 MG/1
50 TABLET, FILM COATED ORAL
Status: DISCONTINUED | OUTPATIENT
Start: 2022-08-21 | End: 2022-08-25 | Stop reason: HOSPADM

## 2022-08-21 RX ORDER — SENNOSIDES 8.6 MG
1 TABLET ORAL
Status: DISCONTINUED | OUTPATIENT
Start: 2022-08-21 | End: 2022-08-25 | Stop reason: HOSPADM

## 2022-08-21 RX ORDER — DOCUSATE SODIUM 100 MG/1
100 CAPSULE, LIQUID FILLED ORAL 2 TIMES DAILY
Status: DISCONTINUED | OUTPATIENT
Start: 2022-08-21 | End: 2022-08-24

## 2022-08-21 RX ORDER — SUCRALFATE 1 G/1
1 TABLET ORAL 4 TIMES DAILY
COMMUNITY
End: 2023-05-25

## 2022-08-21 RX ORDER — NICOTINE 21 MG/24HR
1 PATCH, TRANSDERMAL 24 HOURS TRANSDERMAL DAILY
Status: DISCONTINUED | OUTPATIENT
Start: 2022-08-21 | End: 2022-08-25 | Stop reason: HOSPADM

## 2022-08-21 RX ORDER — LAMOTRIGINE 200 MG/1
200 TABLET ORAL 2 TIMES DAILY
Status: DISCONTINUED | OUTPATIENT
Start: 2022-08-21 | End: 2022-08-25 | Stop reason: HOSPADM

## 2022-08-21 RX ORDER — HYDROXYZINE HYDROCHLORIDE 25 MG/1
25 TABLET, FILM COATED ORAL EVERY 4 HOURS PRN
Status: DISCONTINUED | OUTPATIENT
Start: 2022-08-21 | End: 2022-08-21

## 2022-08-21 RX ORDER — PANTOPRAZOLE SODIUM 40 MG/1
40 TABLET, DELAYED RELEASE ORAL
Status: DISCONTINUED | OUTPATIENT
Start: 2022-08-21 | End: 2022-08-25 | Stop reason: HOSPADM

## 2022-08-21 RX ORDER — MAGNESIUM HYDROXIDE/ALUMINUM HYDROXICE/SIMETHICONE 120; 1200; 1200 MG/30ML; MG/30ML; MG/30ML
30 SUSPENSION ORAL EVERY 4 HOURS PRN
Status: DISCONTINUED | OUTPATIENT
Start: 2022-08-21 | End: 2022-08-25 | Stop reason: HOSPADM

## 2022-08-21 RX ADMIN — LAMOTRIGINE 200 MG: 200 TABLET ORAL at 13:37

## 2022-08-21 RX ADMIN — DOCUSATE SODIUM 100 MG: 100 CAPSULE, LIQUID FILLED ORAL at 20:45

## 2022-08-21 RX ADMIN — DOCUSATE SODIUM 100 MG: 100 CAPSULE, LIQUID FILLED ORAL at 13:37

## 2022-08-21 RX ADMIN — TRAZODONE HYDROCHLORIDE 100 MG: 100 TABLET ORAL at 20:45

## 2022-08-21 RX ADMIN — HYDROXYZINE HYDROCHLORIDE 25 MG: 25 TABLET, FILM COATED ORAL at 11:25

## 2022-08-21 RX ADMIN — LEVOTHYROXINE SODIUM 50 MCG: 50 TABLET ORAL at 20:48

## 2022-08-21 RX ADMIN — CETIRIZINE HYDROCHLORIDE 10 MG: 10 TABLET, FILM COATED ORAL at 13:37

## 2022-08-21 RX ADMIN — SERTRALINE HYDROCHLORIDE 150 MG: 100 TABLET ORAL at 13:36

## 2022-08-21 RX ADMIN — LAMOTRIGINE 200 MG: 200 TABLET ORAL at 20:46

## 2022-08-21 RX ADMIN — NICOTINE 1 PATCH: 21 PATCH, EXTENDED RELEASE TRANSDERMAL at 08:23

## 2022-08-21 RX ADMIN — SUCRALFATE 1 G: 1 TABLET ORAL at 18:26

## 2022-08-21 RX ADMIN — ZIPRASIDONE HYDROCHLORIDE 80 MG: 40 CAPSULE ORAL at 13:37

## 2022-08-21 RX ADMIN — GABAPENTIN 1200 MG: 300 CAPSULE ORAL at 20:46

## 2022-08-21 RX ADMIN — DIVALPROEX SODIUM 1500 MG: 500 TABLET, FILM COATED, EXTENDED RELEASE ORAL at 20:47

## 2022-08-21 RX ADMIN — ZIPRASIDONE HYDROCHLORIDE 80 MG: 40 CAPSULE ORAL at 17:14

## 2022-08-21 RX ADMIN — PANTOPRAZOLE SODIUM 40 MG: 40 TABLET, DELAYED RELEASE ORAL at 17:14

## 2022-08-21 RX ADMIN — Medication 1250 MCG: at 14:15

## 2022-08-21 RX ADMIN — SUCRALFATE 1 G: 1 TABLET ORAL at 14:15

## 2022-08-21 ASSESSMENT — ACTIVITIES OF DAILY LIVING (ADL)
ADLS_ACUITY_SCORE: 21
WEAR_GLASSES_OR_BLIND: YES
ADLS_ACUITY_SCORE: 21
ADLS_ACUITY_SCORE: 21
FALL_HISTORY_WITHIN_LAST_SIX_MONTHS: NO
DOING_ERRANDS_INDEPENDENTLY_DIFFICULTY: NO
ADLS_ACUITY_SCORE: 21
ADLS_ACUITY_SCORE: 21
WALKING_OR_CLIMBING_STAIRS_DIFFICULTY: NO
CONCENTRATING,_REMEMBERING_OR_MAKING_DECISIONS_DIFFICULTY: YES
VISION_MANAGEMENT: GLASSES
ADLS_ACUITY_SCORE: 21
ADLS_ACUITY_SCORE: 21
TOILETING_ISSUES: NO
ADLS_ACUITY_SCORE: 21
DIFFICULTY_EATING/SWALLOWING: NO
ADLS_ACUITY_SCORE: 21
DRESSING/BATHING_DIFFICULTY: NO

## 2022-08-21 NOTE — PLAN OF CARE
Goal Outcome Evaluation: Reviewed with patient  Problem: Suicidal Behavior  Goal: Suicidal Behavior is Absent or Managed  Outcome: Ongoing, Progressing  Flowsheets (Taken 8/21/2022 3884)  Mutually Determined Action Steps (Suicidal Behavior Absent/Managed): (No longer intense; comes and go) shares suicidal thoughts  Note: Patient had a good sleep after after her admission last night. Patient was up early this morning and has been visible in the milieu though isolates to self. Patient says she feels better today compared to yesterday as she is less anxious (got hydroxyzine 25 mg PRN) and feels calm. Patient says the suicidal thoughts are less intense and comes and go. Patient endorse hallucinations but unable to say what she is hearing or seeing at this time.   Patient requested and got her shoes (no laces).   Patient's appetite is good and she requested for double portion order for her meals.   Provider reviewed patient's medications with legal guardian and ordered them. First doses were given at 1pm. Still waiting for Carafate and Vitamin D3 to be sent up by Pharmacy.   Patient's no roommate order has been discontinued by provider (Dr Willingham)
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
## 2022-08-21 NOTE — ED NOTES
Report given to transport. No questions at time of disposition. Belongings with EMS crew, bag of clothes, purse, phone, home medications, blanket and pillow. Pt is in agreement of transfer. Father present at time of transfer. No questions prior to disposition.

## 2022-08-21 NOTE — SAFE
Yasmine Smith  August 20, 2022  SAFE Note    Critical Safety Issues: Patient is suicidal.  Patient may be clumsy and not balanced.  Patient is disabled and has a guardian.      Current Suicidal Ideation/Self-Injurious Concerns/Methods: None - N/A      Current or Historical Inappropriate Sexual Behavior: Yes history of cutting      Current or Historical Aggression/Homicidal Ideation: Agitation/Hyperactivity and Rage      Triggers:   Patient did not report any specific triggers.  Patient feels worthless and has  Low self esteem    Guardianship Status:  active is under the guardianship of Josefina Smith - mom -[phone]. . Guardianship paperwork is not required.    This patient is a child/adolescent: No    This patient has additional special visitor precautions: No    Updated care team: Yes: via progress note      For additional details see full LMHP assessment.       CECY Perez

## 2022-08-21 NOTE — ED NOTES
Unable to contact mother (guardianship of patient), needing verbal consent at [facility] prior to transfer.

## 2022-08-21 NOTE — CARE PLAN
"Goal Outcome Evaluation:  Problem: Sleep Disturbance  Goal: Adequate Sleep/Rest  Outcome: Ongoing, No change    Focus: Shift summary    Data: Patient slept 3.25 hours last night. Patient admitted during the night; No interventions needed. Patient has own pillow in room for comfort; pillow was searched by psych associate and zippered cover was removed and placed in belongings. Patient states that are unable to wear scrubs due to a \"sensory issue\"; recommend follow up; currently in own clothes after being searched. No roommate order in placed due to PTSD from previous roommate experiences. Respirations even and unlabored on status 15 checks. Will continue to monitor and report to oncoming staff.    Response: Report sleep hours to day shift. Continue to monitor patient and provide therapeutic interventions as necessary.    "

## 2022-08-21 NOTE — PHARMACY-ADMISSION MEDICATION HISTORY
Admission Medication History Completed by Pharmacy    See [facility] Admission Navigator for prior to admission medications.     Medication History Sources:     Mckenna (Mom)    Dispense report    [facility] Everywhere    Changes made to PTA medication list (reason):    Added: sucralfate    Deleted: ibuprofen    Changed: clonazepam -> 1 mg PO BID PRN anxiety, Depakote ER -> 1500 mg PO QPM, hydroxyzine -> 50 mg PO Q8 hours PRN anxiety, levothyroxine -> 50 mcg PO QPM, omeprazole -> 40 mg PO BID before meals, senna -> 8.6 mg PO BID, Geodon -> 80 mg PO BID with meals    Additional Information:    Mckenna verified Yasmine's home medications.    Dispense report:  o Clonazepam 1 mg last filled 08/03/2022 for quantity 60 for 30 day supply  o Gabapentin 600 mg last filled 09/13/2021 for quantity 90 for 30 day supply (unable to find any more recent fills)    Prior to Admission medications    Medication Sig Last Dose Taking? Auth Provider Long Term End Date   acetaminophen (TYLENOL) 325 MG tablet Take 650 mg by mouth every 8 hours as needed for mild pain  Yes Unknown, Entered By History     cetirizine (ZYRTEC) 10 MG tablet Take 10 mg by mouth daily Past Week at Unknown time Yes Reported, Patient     cholecalciferol (VITAMIN D3) 1250 mcg (28975 units) capsule Take 1,250 mcg by mouth every 7 days On Sundays 8/14/2022 at Unknown time Yes Unknown, Entered By History     clonazePAM (KLONOPIN) 0.5 MG tablet Take 1 mg by mouth 2 times daily as needed for anxiety  Yes Reported, Patient     divalproex sodium extended-release (DEPAKOTE ER) 500 MG 24 hr tablet Take 1,500 mg by mouth every evening Past Week at Unknown time Yes Reported, Patient     etonogestrel (NEXPLANON) 68 MG IMPL 68 mg by Subdermal route Past Month at Unknown time Yes Reported, Patient Yes 10/23/23   gabapentin (NEURONTIN) 600 MG tablet Take 1 tablet (600 mg) by mouth daily as needed, and take 2 tablets (1200 mg) by mouth at bedtime scheduled Past Week at  Unknown time Yes Unknown, Entered By History     hydrOXYzine (VISTARIL) 50 MG capsule Take 50 mg by mouth every 8 hours as needed for anxiety  Yes Unknown, Entered By History     lamoTRIgine (LAMICTAL) 200 MG tablet Take 200 mg by mouth 2 times daily Past Week at Unknown time Yes Reported, Patient     levothyroxine (SYNTHROID/LEVOTHROID) 50 MCG tablet Take 50 mcg by mouth every evening Past Week at Unknown time Yes Reported, Patient     melatonin 3 MG tablet Take 1 tablet (3 mg) by mouth nightly as needed for sleep  Yes Naegele, Debra Ann, APRN CNS     omeprazole (PRILOSEC) 40 MG DR capsule Take 40 mg by mouth 2 times daily (before meals) Past Week at Unknown time Yes Reported, Patient  7/11/23   ondansetron (ZOFRAN) 8 MG tablet Take 8 mg by mouth every 8 hours as needed for nausea  Yes Unknown, Entered By History     polyethylene glycol (MIRALAX) 17 g packet Take 1 packet by mouth daily as needed for constipation  Yes Unknown, Entered By History     senna (SENOKOT) 8.6 MG tablet Take 1 tablet by mouth 2 times daily Past Week at Unknown time Yes Reported, Patient     sertraline (ZOLOFT) 50 MG tablet Take 100 mg by mouth daily Past Week at Unknown time Yes Unknown, Entered By History     sucralfate (CARAFATE) 1 GM tablet Take 1 g by mouth 4 times daily 14 day course (prescription started 08/17/2022) Past Week at Unknown time Yes Unknown, Entered By History     traZODone (DESYREL) 50 MG tablet Take 100 mg by mouth At Bedtime Past Week at Unknown time Yes Unknown, Entered By History No    ziprasidone (GEODON) 80 MG capsule Take 80 mg by mouth 2 times daily (with meals) Breakfast and supper Past Week at Unknown time Yes Naegele, Debra Ann, APRN CNS Yes       The information provided in this note is only as accurate as the sources available at the time of the update(s).    Date completed: 08/21/22    Medication history completed by: Karime Ricks [facility]

## 2022-08-21 NOTE — PLAN OF CARE
"  ADMISSION    Situation:   Patient (Yasmine) is a 21 years old female admitted to 4A after presenting to [facility] EMERGENCY DEPT suicidal ideation and anxiety.   -Drug screen is positive for Cannabis   -COVID results negative.       Background:  Per ED note; Yasmine Smith is a 21 year old female who presents with her father for evaluation of suicidal ideation and anxiety.  Patient goes to her father's every other weekend.  They were at [employer] when she started to feel very anxious.  Father states that when he came out of a meeting, she was in a state of panic.  2 nurses at the [employer] helped her calm down, but she told her father that she needed to come into the ED.  Father states that this anxiety is much different than typical.  He states that her thought process is very deep and dark.  Father states that the patient told him, \"you can take all of the knives away and take my meds away, but if you go to the bathroom I will be gone.  \"This was very concerning for her father.  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  Apparently they do have accommodations for her sensory issues.     Patient states \"I am not okay \".  She states, \"all of the sudden I fell off of the deep and \".  She started to experience some dyspnea and wheezing.  Reports a history of asthma and her inhaler is apparently .  She states, \"I do not feel like myself \".  Reports return of suicidal thoughts since last evening.  She has a plan of finding a private place so that she could not be found and then she plans to overdose on her own medication for which she states she has access to.  She states, \"it never felt as real as today \"when referencing her suicidal thoughts.  She could not sleep last night.  She states that she has emotions of anger and despair.  She denies taking any street drugs or drinking any alcohol today.  She has not taken any of her as " "needed psychiatric medications.  She denies missing recent doses.  Denies any possibility of pregnancy.  Denies chest pain or palpitations.  No fever, chills, nausea, vomiting, rhinorrhea, congestion, sore throat, or change in taste/smell sensation.  Legal Status  Pt admitted under voluntary status  Patient has active Health Care Agents   SmithJosefina valdovinos (GUARDIAN) (Legal Guardian): [phone] (Mobile)     Assessment  Patient is a 21 years old female who is awake, alert and orientated self, Place and Time. Patient rates depression 8/10,  anxiety 6/10. Patient present with flat and blunted affect, minimal eye contact during the admission process and keeps eyes close when talking back to staff. Patient is agreeable to safety search. Patient denies SI saying \"not at the moment but it comes and goes really quickly.\" Patient endorse AH of screaming voices and VH of snakes and fallen dead [place]. Patient has insight of her current mental health situation   Patient contract for safety on the unit at this time.  SI precautions implemented. Writer explained patient bill of rights; The patient had no questions or concerns. Writer orientated patient to the unit and explained unit rules; The patient has been on the unit before and has no questions or concerns.  Patient has a lot of home medications but has no recollection of which medications she is currently taking or not taking. On call provider unable to order the medications and would be reviewed by patient's Legal guardian in the morning.    Patient is a smoker and will need nicotine patch.   Will continue to monitor and provide interventions as necessary.       Medications:  Current Facility-Administered Medications   Medication     acetaminophen (TYLENOL) tablet 650 mg     alum & mag hydroxide-simethicone (MAALOX) suspension 30 mL     hydrOXYzine (ATARAX) tablet 25 mg     nicotine (NICODERM CQ) 21 MG/24HR 24 hr patch 1 patch     nicotine Patch in " Place     senna-docusate (SENOKOT-S/PERICOLACE) 8.6-50 MG per tablet 1 tablet     traZODone (DESYREL) tablet 50 mg     ziprasidone (GEODON) capsule 20 mg     ziprasidone (GEODON) injection 20 mg     Vitals:  /78 (BP Location: Right arm, Patient Position: Sitting, Cuff Size: Adult Regular)   Pulse 84   Temp 98.2  F (36.8  C) (Oral)   Resp 16   SpO2 97%     Response  Patient will be placed in [facility] at this time. SI precautions and status 15 will be implemented at this time. Patient has PTSD and does not tolerate roommates. No roommate order in place.   The patient had no questions or concerns prior to going to bed @ 02:20. Will continue to monitor and provide interventions at this time.

## 2022-08-21 NOTE — PLAN OF CARE
"  Problem: Suicidal Behavior  Goal: Suicidal Behavior is Absent or Managed  Outcome: Ongoing, Progressing   Goal Outcome Evaluation:      Patient observed sleeping at start of shift.  Was awaken for dinner and ate 100%. She appeared tired, almost sedated \"am really tired and need to rest.\" She denied suicidal ideation with or without a plan, \"am doing well right now.\" She did not present forthcoming when assessed for A/VH. She reported anxiety as low, \"it's not that bad right now.\"  I attempted to check  in with patient  again after dinner. She was resting in bed and was too sleepy to engage in any meaningful interaction.  During HS medication administration, patient needed multiple prompts to wake up to take  medications and when she finally did, had great difficulty keeping her eyes open. She was observed to immediately rolled back to a deep sleep barely seconds after taking medications.   During this entire shift, patient  was out of her room for meals and snacks only. She did not interact with peers.      No prn medication was utilized. She did not present any behavior or safety concerns.         "

## 2022-08-21 NOTE — CONSULTS
TELEPSYCHIATRY TELEPHONE ADMISSION NOTE    Discussed with nurse.  21-year-old female with history of neurodevelopmental delay, presented with suicidal ideation.  Patient has been medically cleared.  PMH significant for hypothyroidism.  Allergy to aripiprazole, banana, cantaloupe, mirtazapine, olanzapine, risperidone.  No known serious substance withdrawal reactions.      Lab findings: COVID negative. Urine drug screen: +cannabis. Alcohol level: unavailable.     PLAN:    --Disposition: Admit to psychiatry under voluntary status.  Routine admission orders placed.  --Precautions: Suicide.  --Psychiatric medications: Hold pending medication reconciliation.    --Medical medications: Hold pending medication reconciliation.          Alex Ruiz MD  Psychiatrist

## 2022-08-21 NOTE — PLAN OF CARE
08/21/22 0153   Patient Belongings   Did you bring any home meds/supplements to the hospital?  Yes   Disposition of meds  Sent to security/pharmacy per site process   Patient Belongings remains with patient;locker;sent to security per site process   Patient Belongings Remaining with Patient clothing;other (see comments)   Patient Belongings Put in Hospital Secure Location (Security or Locker, etc.) cell phone/electronics;clothing;glasses;keys;purse/wallet;shoes;wallet;cash/credit card   Belongings Search Yes   Clothing Search Yes   Goal Outcome Evaluation:    In Patient Bin:  Pizarro Skechers shoes  White pillow case with zipper  Black and white blanket  Pink Bag  Sparkling water - juicy peach (open)  White   Purple charging cable  Suave deodarant  Toothbrushes (2 pack)  Black shorts  Orange t-shirt  Black and white t-shirt  Gray shorts  Multicolored pants  White and black sports bra  Blue disposable masks x2  Floral purse  Black wallet  Blue iPhone with phone case  Electronic watch  Lanyard, keys, and pepper spray  CK Glasses  Clear plastic spoon  Black disposable mask  Yellow butterfly Pop-it  Wet and Wild Lip gloss  LA Colors Lip gloss  Purple pen  Black pouch: Tic tac, blistex lip balm, life savers, and ON nicotine pouches  Green and black pouch: black mask, tampon, and Yasmeen Secrets tease fragrance mist      With patient:  Pillow with floral pillow case  Black tank top  Pants (1)    Sent to security:   [place] ID card  [facility] [employer] Card (green)  [place] [employer] Preferred card    A               Admission:  I am responsible for any personal items that are not sent to the safe or pharmacy.  [facility] is not responsible for loss, theft or damage of any property in my possession.    Signature:  _________________________________ Date: _______  Time: _____                                              Staff Signature:  ____________________________ Date: ________  Time: _____ 2nd  Staff person, if patient is unable/unwilling to sign:    Signature: ________________________________ Date: ________  Time: _____     Discharge:  [place] has returned all of my personal belongings:    Signature: _________________________________ Date: ________  Time: _____                                          Staff Signature:  ____________________________ Date: ________  Time: _____

## 2022-08-21 NOTE — DISCHARGE INSTRUCTIONS
Behavioral Discharge Planning and Instructions    Summary: You were admitted on 8/21/2022  due to Anxiety and Suicidal Ideations.  You were treated by Debra Naegele, ARPN and discharged on 08/26/2022 from [facility]    Main Diagnosis:   MDD, recurrent, moderate to severe  Borderline Personallity Disorder  CUCO (generalized anxiety disorder)  Panic Disorder w/o agorophobia  Autism spectrum disorder    Posttraumatic stress disorder   Suspect Binge Eating Disorder  Cannabis Use Disorder, moderate to severe (Delta 8)  History of persistent depressive disorder  Other insomnia  High risk medication use  Nicotine Use Disorder, moderate to severe  BMI 50.0-59.9, adult   WHODAS 2.0 disability assessment 27/48  Hypertrigleridemia  Fragile X Syndrome  Morbid obesity  Mild intellectual disability (full scale IQ of 78 falling in borderline range from testing in 2011)  Fibromyalgia    Health Care Follow-up:   Psychiatry:   Appointment Date/Time: September 8th @ 10:00am      Psychiatrist: Joselyn Mederos    Address: [facility]     Phone Number: [phone]    Individual Therapy:   Appointment Date/Time: September 3rd @ 4:30pm        Therapist: Nedra Prasad MS, LMFT     Phone Number: [phone]     [facility] : Padmini Alexander, [phone]    Attend all scheduled appointments with your outpatient providers. Call at least 24 hours in advance if you need to reschedule an appointment to ensure continued access to your outpatient providers.     Major Treatments, Procedures and Findings:  You were provided with: a psychiatric assessment, assessed for medical stability, medication evaluation and/or management, and group therapy    Symptoms to Report: feeling more aggressive, increased confusion, losing more sleep, mood getting worse, or thoughts of suicide    Early warning signs can include: increased depression or anxiety sleep disturbances increased thoughts or behaviors of suicide or self-harm  increased  "unusual thinking, such as paranoia or hearing voices    Safety and Wellness:  Take all medicines as directed.  Make no changes unless your doctor suggests them.      Follow treatment recommendations.  Refrain from alcohol and non-prescribed drugs.  Ask your support system to help you reduce your access to items that could harm yourself or others. If there is a concern for safety, call 911.    Resources:   Crisis Intervention: 958.709.8781 or 844-775-3299 (TTY: 600.125.2105).  Call anytime for help.  National [employer] on Mental Illness (www.mn.pita.org): 944.699.8505 or 155-365-8228.  National Suicide Prevention Line (www.mentalhealthmn.org): 419-206-HQXQ (7646)  Text 4 Life: txt \"LIFE\" to 89115 for immediate support and crisis intervention  Crisis text line: Text \"[place]\" to 164301. Free, confidential, 24/7.  [facility] Crisis Referral Line: Call us at [phone] ([phone]) or [phone]    General Medication Instructions:   See your medication sheet(s) for instructions.   Take all medicines as directed.  Make no changes unless your doctor suggests them.   Go to all your doctor visits.  Be sure to have all your required lab tests. This way, your medicines can be refilled on time.  Do not use any drugs not prescribed by your doctor.  Avoid alcohol.    Advance Directives:   Scanned document on file with [employer]? No scanned doc  Is document scanned? Pt states no documents  Honoring Choices Your Rights Handout: Informed and given  Was more information offered? Pt declined    The Treatment team has appreciated the opportunity to work with you. If you have any questions or concerns about your recent admission, you can contact the unit which can receive your call 24 hours a day, 7 days a week. They will be able to get in touch with a Provider if needed. The unit number is [phone] .     "

## 2022-08-21 NOTE — H&P
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 of throat       Mirtazapine Other (See Comments)     Weight gain     Olanzapine Other (See Comments)     Weight gain  And irritability     Risperidone Other (See Comments)     Breast tenderness              Medications:   I have reviewed this patient's current medications  Medications Prior to Admission   Medication Sig Dispense Refill Last Dose     acetaminophen (TYLENOL) 325 MG tablet Take 650 mg by mouth every 8 hours as needed for mild pain        cetirizine (ZYRTEC) 10 MG tablet Take 10 mg by mouth daily        cholecalciferol (VITAMIN D3) 1250 mcg (99146 units) capsule Take 1,250 mcg by mouth every 7 days        clonazePAM (KLONOPIN) 0.5 MG tablet 0.5 mg by Oral or NG Tube route 2 times daily as needed for anxiety        divalproex sodium extended-release (DEPAKOTE ER) 500 MG 24 hr tablet TAKE 2 TABLETS BY MOUTH EVERY DAY        etonogestrel (NEXPLANON) 68 MG IMPL 68 mg by Other route        gabapentin (NEURONTIN) 600 MG tablet Take 1 tablet (600 mg) by mouth daily as needed, and take 2 tablets (1200 mg) by mouth at bedtime scheduled        hydrOXYzine (VISTARIL) 50 MG capsule Take 50 mg by mouth 3 times daily as needed        ibuprofen (ADVIL/MOTRIN) 800 MG tablet Take 800 mg by mouth every 8  hours as needed for moderate pain        lamoTRIgine (LAMICTAL) 200 MG tablet Take 1 tablet by mouth 2 times daily        lamoTRIgine (LAMICTAL) 200 MG tablet Take 200 mg by mouth 2 times daily        levothyroxine (SYNTHROID/LEVOTHROID) 50 MCG tablet Take 50 mcg by mouth        melatonin 3 MG tablet Take 1 tablet (3 mg) by mouth nightly as needed for sleep 30 tablet 1      omeprazole (PRILOSEC) 40 MG DR capsule Take 40 mg by mouth        ondansetron (ZOFRAN) 8 MG tablet Take 8 mg by mouth every 8 hours as needed for nausea        polyethylene glycol (MIRALAX) 17 g packet Take 1 packet by mouth daily as needed for constipation        senna (SENOKOT) 8.6 MG tablet Take 1 tablet by mouth every evening         sertraline (ZOLOFT) 50 MG tablet Take 100 mg by mouth daily        traZODone (DESYREL) 50 MG tablet Take 100 mg by mouth At Bedtime        vitamin D3 (CHOLECALCIFEROL) 1.25 MG (12685 UT) capsule Take 50,000 Units by mouth        ziprasidone (GEODON) 80 MG capsule Take 1 capsule (80 mg) by mouth daily (with dinner) (Patient taking differently: Take 80 mg by mouth 2 times daily (with meals) Breakfast and supper) 30 capsule 1              Labs:     Recent Results (from the past 24 hour(s))   Comprehensive metabolic panel    Collection Time: 08/20/22  5:30 PM   Result Value Ref Range    Sodium 139 133 - 144 mmol/L    Potassium 4.0 3.4 - 5.3 mmol/L    Chloride 107 94 - 109 mmol/L    Carbon Dioxide (CO2) 26 20 - 32 mmol/L    Anion Gap 6 3 - 14 mmol/L    Urea Nitrogen 9 7 - 30 mg/dL    Creatinine 0.75 0.52 - 1.04 mg/dL    Calcium 8.7 8.5 - 10.1 mg/dL    Glucose 115 (H) 70 - 99 mg/dL    Alkaline Phosphatase 45 40 - 150 U/L    AST 14 0 - 45 U/L    ALT 24 0 - 50 U/L    Protein Total 6.5 (L) 6.8 - 8.8 g/dL    Albumin 3.1 (L) 3.4 - 5.0 g/dL    Bilirubin Total 0.2 0.2 - 1.3 mg/dL    GFR Estimate >90 >60 mL/min/1.73m2   INR    Collection Time: 08/20/22  5:30 PM   Result Value Ref Range    INR 0.99 0.85 - 1.15   Alcohol ethyl     Collection Time: 08/20/22  5:30 PM   Result Value Ref Range    Alcohol ethyl <0.01 <=0.01 g/dL   Magnesium    Collection Time: 08/20/22  5:30 PM   Result Value Ref Range    Magnesium 2.0 1.6 - 2.3 mg/dL   Salicylate level    Collection Time: 08/20/22  5:30 PM   Result Value Ref Range    Salicylate 5 <20 mg/dL   Acetaminophen level    Collection Time: 08/20/22  5:30 PM   Result Value Ref Range    Acetaminophen <2 (L) 10 - 30 mg/L   TSH    Collection Time: 08/20/22  5:30 PM   Result Value Ref Range    TSH 1.03 0.40 - 4.00 mU/L   CBC with platelets and differential    Collection Time: 08/20/22  5:30 PM   Result Value Ref Range    WBC Count 8.1 4.0 - 11.0 10e3/uL    RBC Count 4.76 3.80 - 5.20 10e6/uL    Hemoglobin 14.3 11.7 - 15.7 g/dL    Hematocrit 43.0 35.0 - 47.0 %    MCV 90 78 - 100 fL    MCH 30.0 26.5 - 33.0 pg    MCHC 33.3 31.5 - 36.5 g/dL    RDW 13.2 10.0 - 15.0 %    Platelet Count 197 150 - 450 10e3/uL    % Neutrophils 49 %    % Lymphocytes 38 %    % Monocytes 9 %    % Eosinophils 3 %    % Basophils 1 %    % Immature Granulocytes 0 %    NRBCs per 100 WBC 0 <1 /100    Absolute Neutrophils 4.0 1.6 - 8.3 10e3/uL    Absolute Lymphocytes 3.1 0.8 - 5.3 10e3/uL    Absolute Monocytes 0.7 0.0 - 1.3 10e3/uL    Absolute Eosinophils 0.2 0.0 - 0.7 10e3/uL    Absolute Basophils 0.1 0.0 - 0.2 10e3/uL    Absolute Immature Granulocytes 0.0 <=0.4 10e3/uL    Absolute NRBCs 0.0 10e3/uL   Troponin I    Collection Time: 08/20/22  5:30 PM   Result Value Ref Range    Troponin I High Sensitivity <3 <54 ng/L   Asymptomatic COVID-19 Virus (Coronavirus) by PCR Nose    Collection Time: 08/20/22  5:31 PM    Specimen: Nose; Swab   Result Value Ref Range    SARS CoV2 PCR Negative Negative   HCG qualitative urine    Collection Time: 08/20/22  5:43 PM   Result Value Ref Range    hCG Urine Qualitative Negative Negative   Urine Drugs of Abuse Screen Panel 13    Collection Time: 08/20/22  5:43 PM   Result Value Ref Range    Cannabinoids  "(11-agc-4-carboxy-9-THC) Detected (A) Not Detected, Indeterminate    Phencyclidine Not Detected Not Detected, Indeterminate    Cocaine (Benzoylecgonine) Not Detected Not Detected, Indeterminate    Methamphetamine (d-Methamphetamine) Not Detected Not Detected, Indeterminate    Opiates (Morphine) Not Detected Not Detected, Indeterminate    Amphetamine (d-Amphetamine) Not Detected Not Detected, Indeterminate    Benzodiazepines (Nordiazepam) Not Detected Not Detected, Indeterminate    Tricyclic Antidepressants (Desipramine) Not Detected Not Detected, Indeterminate    Methadone Not Detected Not Detected, Indeterminate    Barbiturates (Butalbital) Not Detected Not Detected, Indeterminate    Oxycodone Not Detected Not Detected, Indeterminate    Propoxyphene (Norpropoxyphene) Not Detected Not Detected, Indeterminate    Buprenorphine Not Detected Not Detected, Indeterminate   Lipid Profile    Collection Time: 08/21/22  7:54 AM   Result Value Ref Range    Cholesterol 170 <200 mg/dL    Triglycerides 174 (H) <150 mg/dL    Direct Measure HDL 38 (L) >=50 mg/dL    LDL Cholesterol Calculated 97 <=100 mg/dL    Non HDL Cholesterol 132 (H) <130 mg/dL       BP (!) 141/82   Pulse 90   Temp (!) 96.5  F (35.8  C) (Temporal)   Resp 16   Wt (!) 160.5 kg (353 lb 14.4 oz)   SpO2 97%   BMI 57.12 kg/m    Weight is 353 lbs 14.4 oz  Body mass index is 57.12 kg/m .         Psychiatric Mental Status Examination:   Appearance: awake, alert  Attitude: cooperative and pleasant, personable  Eye Contact: fair, initially difficult to awaken but better eye contact when alert  Mood:  \"really depressed\"  Affect: mood congruent and full range, smiling at times  Speech:  clear, coherent and normal prosody  Language: fluent in English  Psychomotor Behavior:  no evidence of tardive dyskinesia, dystonia, or tics  Gait/Station: normal  Thought Process:  linear, logical, goal oriented  Associations:  no loose associations  Thought Content:  Reporting passive " SI, AH, VH. Denying active SI/HI; no evidence of psychotic thinking  Insight:  fair  Judgement: fair  Oriented to:  time, person, and place  Attention Span and Concentration:  fair, initially falling back asleep after awakening but then more awake, alert with improved concentration  Recent and Remote Memory:  intact  Fund of Knowledge: appropriate    Clinical Global Impressions  First:7     Most recent:6              Physical Exam:   Please refer to physical exam completed by ED provider, Campos Johnson PA-C, on 8/20/22. I agree with the findings and assessment and have no additional findings to add at this time.

## 2022-08-22 PROCEDURE — 124N000002 HC R&B MH UMMC

## 2022-08-22 PROCEDURE — 250N000013 HC RX MED GY IP 250 OP 250 PS 637: Performed by: PSYCHIATRY & NEUROLOGY

## 2022-08-22 PROCEDURE — 99232 SBSQ HOSP IP/OBS MODERATE 35: CPT | Performed by: CLINICAL NURSE SPECIALIST

## 2022-08-22 RX ADMIN — GABAPENTIN 1200 MG: 300 CAPSULE ORAL at 21:04

## 2022-08-22 RX ADMIN — TRAZODONE HYDROCHLORIDE 100 MG: 100 TABLET ORAL at 21:02

## 2022-08-22 RX ADMIN — GABAPENTIN 600 MG: 300 CAPSULE ORAL at 16:02

## 2022-08-22 RX ADMIN — LAMOTRIGINE 200 MG: 200 TABLET ORAL at 08:48

## 2022-08-22 RX ADMIN — ZIPRASIDONE HYDROCHLORIDE 80 MG: 40 CAPSULE ORAL at 17:53

## 2022-08-22 RX ADMIN — CLONAZEPAM 1 MG: 0.5 TABLET ORAL at 13:02

## 2022-08-22 RX ADMIN — PANTOPRAZOLE SODIUM 40 MG: 40 TABLET, DELAYED RELEASE ORAL at 16:02

## 2022-08-22 RX ADMIN — PANTOPRAZOLE SODIUM 40 MG: 40 TABLET, DELAYED RELEASE ORAL at 08:49

## 2022-08-22 RX ADMIN — SUCRALFATE 1 G: 1 TABLET ORAL at 12:22

## 2022-08-22 RX ADMIN — SUCRALFATE 1 G: 1 TABLET ORAL at 08:49

## 2022-08-22 RX ADMIN — LAMOTRIGINE 200 MG: 200 TABLET ORAL at 21:02

## 2022-08-22 RX ADMIN — SUCRALFATE 1 G: 1 TABLET ORAL at 17:00

## 2022-08-22 RX ADMIN — DOCUSATE SODIUM 100 MG: 100 CAPSULE, LIQUID FILLED ORAL at 21:02

## 2022-08-22 RX ADMIN — SUCRALFATE 1 G: 1 TABLET ORAL at 21:01

## 2022-08-22 RX ADMIN — LEVOTHYROXINE SODIUM 50 MCG: 50 TABLET ORAL at 21:06

## 2022-08-22 RX ADMIN — NICOTINE 1 PATCH: 21 PATCH, EXTENDED RELEASE TRANSDERMAL at 08:48

## 2022-08-22 RX ADMIN — SERTRALINE HYDROCHLORIDE 150 MG: 100 TABLET ORAL at 08:49

## 2022-08-22 RX ADMIN — DIVALPROEX SODIUM 1500 MG: 500 TABLET, FILM COATED, EXTENDED RELEASE ORAL at 21:02

## 2022-08-22 RX ADMIN — CETIRIZINE HYDROCHLORIDE 10 MG: 10 TABLET, FILM COATED ORAL at 08:47

## 2022-08-22 RX ADMIN — DOCUSATE SODIUM 100 MG: 100 CAPSULE, LIQUID FILLED ORAL at 08:47

## 2022-08-22 RX ADMIN — ZIPRASIDONE HYDROCHLORIDE 80 MG: 40 CAPSULE ORAL at 08:49

## 2022-08-22 ASSESSMENT — ACTIVITIES OF DAILY LIVING (ADL)
ORAL_HYGIENE: INDEPENDENT
LAUNDRY: UNABLE TO COMPLETE
HYGIENE/GROOMING: INDEPENDENT
ADLS_ACUITY_SCORE: 21
DRESS: STREET CLOTHES

## 2022-08-22 NOTE — PLAN OF CARE
Goal Outcome Evaluation:    Plan of Care Reviewed With: patient      Patient stayed in room most of the time this shift sleeping.  Up to the dinning room for breakfast and lunch.  Flat affect, denies SI/SIB, depression, received prn klonopin for increased anxiety.  Good appetite, medication compliant; went back to room after lunch.  Patient currently sleeping at this time.  Will continue to monitor closely.

## 2022-08-22 NOTE — PLAN OF CARE
"  Problem: Suicide Risk  Goal: Absence of Self-Harm  Outcome: Ongoing, Progressing   Goal Outcome Evaluation:        Received pt sleeping at the start of the shift. Pt complaining of pain from \"neuropathy\" at 1600, PRN gabapentin requested and administered. Pt ate dinner in the lounge. Pt stated she feels \"good.\" Pt denies any SI/SIB, denies hallucinations, denies depression. Pt stated she was feeling some what anxious but stated \"i'm feeling better now.\" Pt had a blunted/flat affect. Pt was isolative and withdrawn to self. Pt spent much of the shift sleeping in room. Pt was calm and cooperative, medication compliant. Will continue to monitor.              "

## 2022-08-22 NOTE — CARE PLAN
Goal Outcome Evaluation:  Problem: Sleep Disturbance  Goal: Adequate Sleep/Rest  Outcome: Ongoing, No change    Focus: Shift summary    Data: Patient slept 6.5 hours last night. No interventions needed. Woke up x 1 and spent time in the dining room prior to going to back to bed. Respirations even and unlabored on status 15 checks. Will continue to monitor and report to oncoming staff.    Response: Report sleep hours to day shift. Continue to monitor patient and provide therapeutic interventions as necessary.

## 2022-08-22 NOTE — PROGRESS NOTES
Patient was awake once to get a snack and went back to bed. Patient slept of over 6 hrs. No safety concerns at this time.

## 2022-08-22 NOTE — PROGRESS NOTES
[facility], [place]   Psychiatric Progress Note        Interim History:   The patient's care was discussed with the treatment team during the daily team meeting and/or staff's chart notes were reviewed.  Staff report patient has been isolating to room and sleeping.     Psychiatric symptoms and interventions:     Patient reports she is tired. Patient was up for her meds and meals. Patient talked briefly with provider. Patient reports depression but denies suicidal thinking. Patient isolated to her room most of the day.     Patient did not want to interact with provider. Will monitor patient regarding her drowsiness.     Appetite adequate. Excessive sleeping.              Medications:       cetirizine  10 mg Oral Daily     cholecalciferol  1,250 mcg Oral Q7 Days     divalproex sodium extended-release  1,500 mg Oral At Bedtime     docusate sodium  100 mg Oral BID     gabapentin  1,200 mg Oral At Bedtime     lamoTRIgine  200 mg Oral BID     levothyroxine  50 mcg Oral QPM     nicotine  1 patch Transdermal Daily     nicotine   Transdermal Q8H     pantoprazole  40 mg Oral BID AC     sertraline  150 mg Oral Daily     sucralfate  1 g Oral TID w/meals     sucralfate  1 g Oral At Bedtime     traZODone  100 mg Oral At Bedtime     ziprasidone  80 mg Oral BID w/meals          Allergies:     Allergies   Allergen Reactions     Aripiprazole Other (See Comments)     Restlessness     Banana Swelling     Throat swells/ puffy     Food Swelling     Cantalope, causes swelling/puffiness of throat       Mirtazapine Other (See Comments)     Weight gain     Olanzapine Other (See Comments)     Weight gain  And irritability     Risperidone Other (See Comments)     Breast tenderness          Labs:   No results found for this or any previous visit (from the past 24 hour(s)).       Psychiatric Examination:     /86   Pulse 108   Temp 97.6  F (36.4  C) (Temporal)   Resp 16   Wt (!) 160.5 kg (353 lb 14.4 oz)    SpO2 95%   BMI 57.12 kg/m    Weight is 353 lbs 14.4 oz  Body mass index is 57.12 kg/m .  Orthostatic Vitals  Report    None            Appearance: adequately groomed and dressed in hospital scrubs, fatigued  Attitude:  guarded  Eye Contact:  poor   Mood:  depressed  Affect:  intensity is flat  Speech:  paucity of speech  Psychomotor Behavior:  no evidence of tardive dyskinesia, dystonia, or tics  Throught Process:  goal oriented  Associations:  no loose associations  Thought Content:  no evidence of suicidal ideation or homicidal ideation  Insight:  limited  Judgement:  limited  Oriented to:  time, person, and place  Attention Span and Concentration:  intact  Recent and Remote Memory:  intact    Clinical Global Impressions  First:     Most recent:            Precautions:     Behavioral Orders   Procedures     Code 1 - Restrict to Unit     Routine Programming     As clinically indicated     Status 15     Every 15 minutes.     Suicide precautions     Patients on Suicide Precautions should have a Combination Diet ordered that includes a Diet selection(s) AND a Behavioral Tray selection for Safe Tray - with utensils, or Safe Tray - NO utensils            DIagnoses:   MDD, recurrent, moderate to severe  Borderline Personallity Disorder  CUCO (generalized anxiety disorder)  Panic Disorder w/o agorophobia  Autism spectrum disorder    Posttraumatic stress disorder   Suspect Binge Eating Disorder  Cannabis Use Disorder, moderate to severe (Delta 8)  History of persistent depressive disorder  Other insomnia  High risk medication use  Nicotine Use Disorder, moderate to severe  BMI 50.0-59.9, adult   WHODAS 2.0 disability assessment 27/48  Hypertrigleridemia  Fragile X Syndrome  Morbid obesity  Mild intellectual disability (full scale IQ of 78 falling in borderline range from testing in 2011)  Fibromyalgia         Plan:      Legal status: Voluntary Guardian: Josefina Smith(mom) [phone]    Medication management:   Zoloft was  increased to 150 mg to address depressive and anxiety symptoms     Medical:     Behavioral/psychology/social:   Encouraged patient to attend therapeutic hospital programming as tolerated   Precautions: suicide    Disposition:   Reason for continued hospitalization: Patient is a safety risk in the community   Stabilization with medications, provide structured and supportive environment. groups   Estimated length of stay is 3-5 days   Discharge: TBD

## 2022-08-22 NOTE — PROGRESS NOTES
08/22/22 1050   Individualization/Patient Specific Goals   Patient Personal Strengths family/social support;socioeconomic stability;stable living environment   Patient Vulnerabilities lacks insight into illness;poor impulse control;occupational insecurity;limited support system;limited social skills   Individualized Care Needs DBT   Patient-Specific Goals (Include Timeframe) Symptom Stabilization   Interprofessional Rounds   Summary Discussed pt's current progress on unit.   Participants advanced practice nurse;nursing;CTC;OT   Team Discussion   Participants Debra Naegele, APRN, Funmi, RN, Kadi Boone, OT and JOSSUE Kelly   Progress Continuing to Assess   Anticipated length of stay 3-5 days   Continued Stay Criteria/Rationale New Patient   Medical/Physical No acute issues   Plan CTC will coordiante disposition and aftercare plans   Rationale for change in precautions or plan No Change   Anticipated Discharge Disposition home with family   PRECAUTIONS AND SAFETY    Behavioral Orders   Procedures    Code 1 - Restrict to Unit    Routine Programming     As clinically indicated    Status 15     Every 15 minutes.    Suicide precautions     Patients on Suicide Precautions should have a Combination Diet ordered that includes a Diet selection(s) AND a Behavioral Tray selection for Safe Tray - with utensils, or Safe Tray - NO utensils

## 2022-08-23 PROCEDURE — 250N000013 HC RX MED GY IP 250 OP 250 PS 637: Performed by: PSYCHIATRY & NEUROLOGY

## 2022-08-23 PROCEDURE — 99232 SBSQ HOSP IP/OBS MODERATE 35: CPT | Performed by: CLINICAL NURSE SPECIALIST

## 2022-08-23 PROCEDURE — 124N000002 HC R&B MH UMMC

## 2022-08-23 RX ADMIN — SENNOSIDES AND DOCUSATE SODIUM 1 TABLET: 50; 8.6 TABLET ORAL at 18:39

## 2022-08-23 RX ADMIN — LAMOTRIGINE 200 MG: 200 TABLET ORAL at 20:27

## 2022-08-23 RX ADMIN — CETIRIZINE HYDROCHLORIDE 10 MG: 10 TABLET, FILM COATED ORAL at 07:58

## 2022-08-23 RX ADMIN — SUCRALFATE 1 G: 1 TABLET ORAL at 21:42

## 2022-08-23 RX ADMIN — DIVALPROEX SODIUM 1500 MG: 500 TABLET, FILM COATED, EXTENDED RELEASE ORAL at 21:42

## 2022-08-23 RX ADMIN — ZIPRASIDONE HYDROCHLORIDE 80 MG: 40 CAPSULE ORAL at 17:03

## 2022-08-23 RX ADMIN — TRAZODONE HYDROCHLORIDE 100 MG: 100 TABLET ORAL at 21:42

## 2022-08-23 RX ADMIN — ZIPRASIDONE HYDROCHLORIDE 80 MG: 40 CAPSULE ORAL at 09:06

## 2022-08-23 RX ADMIN — PANTOPRAZOLE SODIUM 40 MG: 40 TABLET, DELAYED RELEASE ORAL at 07:58

## 2022-08-23 RX ADMIN — SUCRALFATE 1 G: 1 TABLET ORAL at 07:58

## 2022-08-23 RX ADMIN — SUCRALFATE 1 G: 1 TABLET ORAL at 17:03

## 2022-08-23 RX ADMIN — SERTRALINE HYDROCHLORIDE 150 MG: 100 TABLET ORAL at 07:58

## 2022-08-23 RX ADMIN — DOCUSATE SODIUM 100 MG: 100 CAPSULE, LIQUID FILLED ORAL at 07:59

## 2022-08-23 RX ADMIN — LAMOTRIGINE 200 MG: 200 TABLET ORAL at 07:58

## 2022-08-23 RX ADMIN — PANTOPRAZOLE SODIUM 40 MG: 40 TABLET, DELAYED RELEASE ORAL at 17:03

## 2022-08-23 RX ADMIN — SUCRALFATE 1 G: 1 TABLET ORAL at 12:02

## 2022-08-23 RX ADMIN — DOCUSATE SODIUM 100 MG: 100 CAPSULE, LIQUID FILLED ORAL at 20:27

## 2022-08-23 RX ADMIN — GABAPENTIN 1200 MG: 300 CAPSULE ORAL at 21:42

## 2022-08-23 RX ADMIN — NICOTINE 1 PATCH: 21 PATCH, EXTENDED RELEASE TRANSDERMAL at 08:00

## 2022-08-23 RX ADMIN — LEVOTHYROXINE SODIUM 50 MCG: 50 TABLET ORAL at 20:27

## 2022-08-23 ASSESSMENT — ACTIVITIES OF DAILY LIVING (ADL)
ADLS_ACUITY_SCORE: 21
LAUNDRY: WITH SUPERVISION
ADLS_ACUITY_SCORE: 21
HYGIENE/GROOMING: INDEPENDENT
ADLS_ACUITY_SCORE: 21
LAUNDRY: WITH SUPERVISION
ADLS_ACUITY_SCORE: 21
ORAL_HYGIENE: INDEPENDENT
ORAL_HYGIENE: INDEPENDENT
DRESS: STREET CLOTHES
ADLS_ACUITY_SCORE: 21
DRESS: STREET CLOTHES
HYGIENE/GROOMING: INDEPENDENT
ADLS_ACUITY_SCORE: 21
ADLS_ACUITY_SCORE: 21

## 2022-08-23 NOTE — PROGRESS NOTES
Behavioral Health  Note    Behavioral Health  Spirituality Group Note    UNIT 4a    Name: Yasmine Smith YOB: 2000   MRN: [id number] Age: 21 year old      Patient attended -led group, which included discussion of spirituality, coping with illness and building resilience.    Patient attended group for NC hrs.    Today's group focused on discussion of personal spiritualities and beliefs along with where patients found peace in their lives. Group today also incorporated mindfulness coloring and calming music.     Pt wandered in and out of group several times, both to meet with members of the care team as well as to follow her own desires. While pt was present she expressed confusion as to who I was and what I was doing there, but did participate in coloring.       Elly Burns [facility]   Pager: [phone]

## 2022-08-23 NOTE — PLAN OF CARE
"Assessment/Intervention/Current Symptoms and Care Coordination:      Chart Review    Met with team to discuss patient care.    Facilitated Care progression Huddle    Checked in with pt about discharge. Pt reports she is feeling better and is hoping to discharge soon because her birthday is tomorrow and \"I have plans.\" Discharge after care plans. Pt reported that she knows she needs to go back to her therapist and has an appointment set up for next week. Discussed seeing therapist weekly vs every other week. Pt thought this was a good idea.     Discharge Plan or Goal:  Pending stabilization & development of a safe discharge plan.  Considerations include: home with family    Barriers to Discharge:  Patient requires further psychiatric stabilization due to current symptomology, Medication management with possible adjustments    Referral Status:  Pt reports she has a therapy appointment next week but doesn't know the day or time of the appointment.     Legal Status:  voluntary (signed by guardian)     "

## 2022-08-23 NOTE — PROGRESS NOTES
"[facility], [place]   Psychiatric Progress Note        Interim History:   The patient's care was discussed with the treatment team during the daily team meeting and/or staff's chart notes were reviewed.  Staff report patient has greater presence in the milieu and attends some groups.     Psychiatric symptoms and interventions:     Patient reports she is feeling \"less droswsy\". Patient has been going to some groups. Patient reports she had a panic attack at home and it has passed.\"  Patient denied suicidal thinking. Patient reports tomorrow is her birthday and wants to go home.     Provider discussed treatment with guardian/motherwho reports patient has not been following through with her mental health treatment. Patient has missed several therapists appointments. Patient fired her [employer] worker. Mother is concerned that patient will not follow through with recommendations.     Sertraline increased to 150 mg to address depressive and anxiety symptoms.   Patient denies any side effects.          Medications:       cetirizine  10 mg Oral Daily     cholecalciferol  1,250 mcg Oral Q7 Days     divalproex sodium extended-release  1,500 mg Oral At Bedtime     docusate sodium  100 mg Oral BID     gabapentin  1,200 mg Oral At Bedtime     lamoTRIgine  200 mg Oral BID     levothyroxine  50 mcg Oral QPM     nicotine  1 patch Transdermal Daily     nicotine   Transdermal Q8H     pantoprazole  40 mg Oral BID AC     sertraline  150 mg Oral Daily     sucralfate  1 g Oral TID w/meals     sucralfate  1 g Oral At Bedtime     traZODone  100 mg Oral At Bedtime     ziprasidone  80 mg Oral BID w/meals          Allergies:     Allergies   Allergen Reactions     Aripiprazole Other (See Comments)     Restlessness     Banana Swelling     Throat swells/ puffy     Food Swelling     Cantalope, causes swelling/puffiness of throat       Mirtazapine Other (See Comments)     Weight gain     Olanzapine Other (See Comments)    "  Weight gain  And irritability     Risperidone Other (See Comments)     Breast tenderness          Labs:   No results found for this or any previous visit (from the past 24 hour(s)).       Psychiatric Examination:     /84   Pulse 108   Temp 97.3  F (36.3  C) (Temporal)   Resp 16   Wt (!) 160.5 kg (353 lb 14.4 oz)   SpO2 95%   BMI 57.12 kg/m    Weight is 353 lbs 14.4 oz  Body mass index is 57.12 kg/m .  Orthostatic Vitals  Report    None            Appearance: awake, alert and adequately groomed  Attitude:  cooperative  Eye Contact:  good  Mood:  better  Affect:  appropriate and in normal range  Speech:  normal prosody  Psychomotor Behavior:  no evidence of tardive dyskinesia, dystonia, or tics  Throught Process:  goal oriented, concrete, lower functioning  Associations:  no loose associations  Thought Content:  no evidence of suicidal ideation or homicidal ideation  Insight:  limited  Judgement:  limited  Oriented to:  time, person, and place  Attention Span and Concentration:  intact  Recent and Remote Memory:  intact    Clinical Global Impressions  First:     Most recent:            Precautions:     Behavioral Orders   Procedures     Code 1 - Restrict to Unit     Routine Programming     As clinically indicated     Status 15     Every 15 minutes.     Suicide precautions     Patients on Suicide Precautions should have a Combination Diet ordered that includes a Diet selection(s) AND a Behavioral Tray selection for Safe Tray - with utensils, or Safe Tray - NO utensils            DIagnoses:   MDD, recurrent, moderate to severe  Borderline Personallity Disorder  CUCO (generalized anxiety disorder)  Panic Disorder w/o agorophobia  Autism spectrum disorder    Posttraumatic stress disorder   Suspect Binge Eating Disorder  Cannabis Use Disorder, moderate to severe (Delta 8)  History of persistent depressive disorder  Other insomnia  High risk medication use  Nicotine Use Disorder, moderate to severe  BMI  50.0-59.9, adult   WHODAS 2.0 disability assessment 27/48  Hypertrigleridemia  Fragile X Syndrome  Morbid obesity  Mild intellectual disability (full scale IQ of 78 falling in borderline range from testing in 2011)  Fibromyalgia         Plan:     Legal status: Voluntary Guardian: Josefina Smith(mom) [phone]     Medication management:   Zoloft was increased to 150 mg to address depressive and anxiety symptoms      Medical:      Behavioral/psychology/social:   Encouraged patient to attend therapeutic hospital programming as tolerated   Precautions: suicide     Disposition:   Reason for continued hospitalization: Patient is a safety risk in the community   Stabilization with medications, provide structured and supportive environment. groups   Estimated length of stay is 3-5 days   Discharge: Return to home with services

## 2022-08-23 NOTE — PROGRESS NOTES
Patient was awake once and asked if it was 6:00 am already but was told the time is 4:00 am. Patient said she could not sleep and requested for some sleepy time tea before returning to bed. Patient slept for about 6.5 hrs. No safety concerns at this time.

## 2022-08-23 NOTE — PLAN OF CARE
Problem: Suicide Risk  Goal: Absence of Self-Harm  Intervention: Assess Risk to Self and Maintain Safety  Recent Flowsheet Documentation  Taken 8/23/2022 0900 by Lor Calhoun RN  Behavior Management: behavioral plan reviewed  Self-Harm Prevention: environmental self-harm risks assessed   Goal Outcome Evaluation:    Plan of Care Reviewed With: patient     Pt was visible on the unit at the beginning of the shift. Most of day shift was spent in bed sleeping. Made several phone calls in the morning. Was social with select peers and staff. Was encouraged to attend unit activities. Was out for meals, ate 100%. Had flat/blunted affect. Cooperative and med compliant. Denied all psych symptoms. No pain reported on days. Okay for patient to have blanket. [place] to be assesed for safety first. No verbal outbursts and no behavioral issues noted.  Will continue to monitor.      /84   Pulse 108   Temp 97.3  F (36.3  C) (Temporal)   Resp 16   Wt (!) 160.5 kg (353 lb 14.4 oz)   SpO2 95%   BMI 57.12 kg/m

## 2022-08-24 PROCEDURE — 250N000013 HC RX MED GY IP 250 OP 250 PS 637: Performed by: CLINICAL NURSE SPECIALIST

## 2022-08-24 PROCEDURE — 250N000013 HC RX MED GY IP 250 OP 250 PS 637: Performed by: PSYCHIATRY & NEUROLOGY

## 2022-08-24 PROCEDURE — 124N000002 HC R&B MH UMMC

## 2022-08-24 PROCEDURE — 99232 SBSQ HOSP IP/OBS MODERATE 35: CPT | Performed by: CLINICAL NURSE SPECIALIST

## 2022-08-24 RX ORDER — AMOXICILLIN 250 MG
2 CAPSULE ORAL 2 TIMES DAILY
Status: DISCONTINUED | OUTPATIENT
Start: 2022-08-24 | End: 2022-08-25 | Stop reason: HOSPADM

## 2022-08-24 RX ADMIN — SUCRALFATE 1 G: 1 TABLET ORAL at 08:16

## 2022-08-24 RX ADMIN — DOCUSATE SODIUM 100 MG: 100 CAPSULE, LIQUID FILLED ORAL at 08:16

## 2022-08-24 RX ADMIN — SERTRALINE HYDROCHLORIDE 150 MG: 100 TABLET ORAL at 08:16

## 2022-08-24 RX ADMIN — LEVOTHYROXINE SODIUM 50 MCG: 50 TABLET ORAL at 19:30

## 2022-08-24 RX ADMIN — SENNOSIDES AND DOCUSATE SODIUM 1 TABLET: 50; 8.6 TABLET ORAL at 09:27

## 2022-08-24 RX ADMIN — LAMOTRIGINE 200 MG: 200 TABLET ORAL at 08:16

## 2022-08-24 RX ADMIN — ZIPRASIDONE HYDROCHLORIDE 80 MG: 40 CAPSULE ORAL at 08:16

## 2022-08-24 RX ADMIN — LAMOTRIGINE 200 MG: 200 TABLET ORAL at 19:30

## 2022-08-24 RX ADMIN — SENNOSIDES AND DOCUSATE SODIUM 1 TABLET: 50; 8.6 TABLET ORAL at 02:53

## 2022-08-24 RX ADMIN — ZIPRASIDONE HYDROCHLORIDE 80 MG: 40 CAPSULE ORAL at 17:20

## 2022-08-24 RX ADMIN — PANTOPRAZOLE SODIUM 40 MG: 40 TABLET, DELAYED RELEASE ORAL at 08:16

## 2022-08-24 RX ADMIN — SUCRALFATE 1 G: 1 TABLET ORAL at 17:20

## 2022-08-24 RX ADMIN — GABAPENTIN 1200 MG: 300 CAPSULE ORAL at 21:15

## 2022-08-24 RX ADMIN — GLYCERIN 1 SUPPOSITORY: 2 SUPPOSITORY RECTAL at 17:34

## 2022-08-24 RX ADMIN — SENNOSIDES AND DOCUSATE SODIUM 2 TABLET: 50; 8.6 TABLET ORAL at 10:20

## 2022-08-24 RX ADMIN — PANTOPRAZOLE SODIUM 40 MG: 40 TABLET, DELAYED RELEASE ORAL at 16:56

## 2022-08-24 RX ADMIN — ACETAMINOPHEN 975 MG: 325 TABLET, FILM COATED ORAL at 14:35

## 2022-08-24 RX ADMIN — SODIUM PHOSPHATE, DIBASIC AND SODIUM PHOSPHATE, MONOBASIC 1 ENEMA: 7; 19 ENEMA RECTAL at 18:57

## 2022-08-24 RX ADMIN — CETIRIZINE HYDROCHLORIDE 10 MG: 10 TABLET, FILM COATED ORAL at 08:16

## 2022-08-24 RX ADMIN — SUCRALFATE 1 G: 1 TABLET ORAL at 21:15

## 2022-08-24 RX ADMIN — DIVALPROEX SODIUM 1500 MG: 500 TABLET, FILM COATED, EXTENDED RELEASE ORAL at 21:15

## 2022-08-24 RX ADMIN — NICOTINE 1 PATCH: 21 PATCH, EXTENDED RELEASE TRANSDERMAL at 08:16

## 2022-08-24 RX ADMIN — TRAZODONE HYDROCHLORIDE 100 MG: 100 TABLET ORAL at 21:15

## 2022-08-24 RX ADMIN — SUCRALFATE 1 G: 1 TABLET ORAL at 12:54

## 2022-08-24 ASSESSMENT — ACTIVITIES OF DAILY LIVING (ADL)
ORAL_HYGIENE: INDEPENDENT
DRESS: INDEPENDENT
ADLS_ACUITY_SCORE: 21
HYGIENE/GROOMING: INDEPENDENT
ADLS_ACUITY_SCORE: 21
LAUNDRY: WITH SUPERVISION
ADLS_ACUITY_SCORE: 21
ADLS_ACUITY_SCORE: 21

## 2022-08-24 NOTE — PLAN OF CARE
Problem: Suicide Risk  Goal: Absence of Self-Harm  Intervention: Promote Psychosocial Wellbeing  Recent Flowsheet Documentation  Taken 8/24/2022 1000 by Lor Calhoun RN  Supportive Measures: active listening utilized  Sleep/Rest Enhancement: comfort measures  Family/Support System Care: support provided   Goal Outcome Evaluation:Ongoing    Plan of Care Reviewed With: patient     Pt was up early, was out coloring in the lounge at the beginning of the shift. Showered and did laundry before breakfast. Ate 100% meals and was med compliant. Continued to report constipation. PRN Senna was administered per orders. Provider was updated. Has new orders to administer 2 tablets Senna BID. Pt encouraged to drink fluids and walk. If no BM by evening, pt encouraged to utilize new suppository order. Will continue to monitor. Was encouraged to attend groups but declined stating that she was tired and sleepy. Was pleasant, cooperative, and med compliant. Denied all psych symptoms. No verbal outbursts and no behavioral issues noted. PRN Tylenol 975mg administered for 4/10 headache. Will continue to monitor.      /84 (BP Location: Left arm, Patient Position: Sitting, Cuff Size: Adult Regular)   Pulse 104   Temp 97.5  F (36.4  C) (Temporal)   Resp 20   Wt (!) 160.5 kg (353 lb 14.4 oz)   SpO2 95%   BMI 57.12 kg/m

## 2022-08-24 NOTE — PROGRESS NOTES
"[facility], [place]   Psychiatric Progress Note        Interim History:   The patient's care was discussed with the treatment team during the daily team meeting and/or staff's chart notes were reviewed.  Staff report patient  has greater presence in the milieu and attends some groups.       Psychiatric symptoms and interventions:    Patient reports she is constipated. Ordered pericolace and suppository. Provider and nurse encouraged patient to increase her movement on the unit and drink more water. Patient admitted that she likes to lay in bed. Patient reports low motivation.     Patient said her mood is\" OK.\" She is looking forward to her mother sending in packaged food for her birthday. Patient deneis suicidal thinking. Patient reports sometimes she feels OK and sometimes she does not want to \"do anything.\"     Patient has been cooperative with taking her medications. Patient denies side effects from her medications.     Patient has not been aggressive on the unit. Patient needs encourage to participate in groups and practice coping skills.          Medications:       cetirizine  10 mg Oral Daily     cholecalciferol  1,250 mcg Oral Q7 Days     divalproex sodium extended-release  1,500 mg Oral At Bedtime     docusate sodium  100 mg Oral BID     gabapentin  1,200 mg Oral At Bedtime     lamoTRIgine  200 mg Oral BID     levothyroxine  50 mcg Oral QPM     nicotine  1 patch Transdermal Daily     nicotine   Transdermal Q8H     pantoprazole  40 mg Oral BID AC     senna-docusate  2 tablet Oral BID     sertraline  150 mg Oral Daily     sucralfate  1 g Oral TID w/meals     sucralfate  1 g Oral At Bedtime     traZODone  100 mg Oral At Bedtime     ziprasidone  80 mg Oral BID w/meals          Allergies:     Allergies   Allergen Reactions     Aripiprazole Other (See Comments)     Restlessness     Banana Swelling     Throat swells/ puffy     Food Swelling     Cantalope, causes swelling/puffiness of " throat       Mirtazapine Other (See Comments)     Weight gain     Olanzapine Other (See Comments)     Weight gain  And irritability     Risperidone Other (See Comments)     Breast tenderness          Labs:   No results found for this or any previous visit (from the past 24 hour(s)).       Psychiatric Examination:     /84 (BP Location: Left arm, Patient Position: Sitting, Cuff Size: Adult Regular)   Pulse 104   Temp 97.5  F (36.4  C) (Temporal)   Resp 20   Wt (!) 160.5 kg (353 lb 14.4 oz)   SpO2 95%   BMI 57.12 kg/m    Weight is 353 lbs 14.4 oz  Body mass index is 57.12 kg/m .  Orthostatic Vitals  Report    None          Appearance: awake, alert and adequately groomed  Attitude:  cooperative  Eye Contact:  good  Mood:  better  Affect:  appropriate and in normal range  Speech:  normal prosody  Psychomotor Behavior:  no evidence of tardive dyskinesia, dystonia, or tics  Throught Process:  goal oriented, concrete, lower functioning  Associations:  no loose associations  Thought Content:  no evidence of suicidal ideation or homicidal ideation  Insight:  limited  Judgement:  limited  Oriented to:  time, person, and place  Attention Span and Concentration:  intact  Recent and Remote Memory:  intact         Clinical Global Impressions  First:     Most recent:            Precautions:     Behavioral Orders   Procedures     Code 1 - Restrict to Unit     Routine Programming     As clinically indicated     Status 15     Every 15 minutes.     Suicide precautions     Patients on Suicide Precautions should have a Combination Diet ordered that includes a Diet selection(s) AND a Behavioral Tray selection for Safe Tray - with utensils, or Safe Tray - NO utensils            DIagnoses:   MDD, recurrent, moderate to severe  Borderline Personallity Disorder  CUCO (generalized anxiety disorder)  Panic Disorder w/o agorophobia  Autism spectrum disorder    Posttraumatic stress disorder   Suspect Binge Eating Disorder  Cannabis Use  Disorder, moderate to severe (Delta 8)  History of persistent depressive disorder  Other insomnia  High risk medication use  Nicotine Use Disorder, moderate to severe  BMI 50.0-59.9, adult   WHODAS 2.0 disability assessment 27/48  Hypertrigleridemia  Fragile X Syndrome  Morbid obesity  Mild intellectual disability (full scale IQ of 78 falling in borderline range from testing in 2011)  Fibromyalgia         Plan:   Legal status: Voluntary Guardian: Josefina Smith(mom) [phone]     Medication management:   Zoloft was increased to 150 mg to address depressive and anxiety symptoms      Medical:      Behavioral/psychology/social:   Encouraged patient to attend therapeutic hospital programming as tolerated   Precautions: suicide     Disposition:   Reason for continued hospitalization: Patient is a safety risk in the community   Stabilization with medications, provide structured and supportive environment. groups   Estimated length of stay is 3-5 days   Discharge: Return to home with services

## 2022-08-24 NOTE — PLAN OF CARE
Goal Outcome Evaluation:    Pt was a wake x1 briefly.  She c/o constipation and received senna-docusate per request.  Otherwise appeared to be a sleep @ all other safety checks.

## 2022-08-24 NOTE — PLAN OF CARE
Problem: Sleep Disturbance  Goal: Adequate Sleep/Rest  Intervention: Promote Sleep/Rest  Recent Flowsheet Documentation  Taken 8/23/2022 1900 by Bella Lezama RN  Sleep/Rest Enhancement: comfort measures     Problem: Suicide Risk  Goal: Absence of Self-Harm  Intervention: Assess Risk to Self and Maintain Safety  Recent Flowsheet Documentation  Taken 8/23/2022 1900 by Bella Lezama RN  Behavior Management: behavioral plan reviewed  Self-Harm Prevention: environmental self-harm risks assessed  Intervention: Promote Psychosocial Wellbeing  Recent Flowsheet Documentation  Taken 8/23/2022 1900 by Bella Lezama RN  Supportive Measures: active listening utilized  Sleep/Rest Enhancement: comfort measures  Intervention: Establish Safety Plan and Continuity of Care  Recent Flowsheet Documentation  Taken 8/23/2022 1900 by Bella Lezama RN  Safe Transition Promotion: personal safety plan developed   Goal Outcome Evaluation:    Plan of Care Reviewed With: patient        Pt presented with bright effect. Alert and oriented x4. Patient denies pain, anxiety, depression, suicidal ideation, hallucination and contracted for safety. She was medication compliant. Pt was visible on the unit interacting positively with peers. Good appetite at supper. Patient does not exhibits any delusional thoughts. Patient C/O constipation and received PRN [place]. No BM yet. PO fluid provided and encouraged. Pt denies pan or discomfort. Will continue to monitor and report.

## 2022-08-24 NOTE — PLAN OF CARE
Goal Outcome Evaluation:    Plan of Care Reviewed With: patient        Problem: Suicide Risk  Goal: Absence of Self-Harm  Outcome: Ongoing, Progressing     Problem: Suicidal Behavior  Goal: Suicidal Behavior is Absent or Managed  Outcome: Ongoing, Progressing     Patient presented with brighter affect. Alert and oriented X 4 pleasant on approach. Patient told staff that today was her birthday and she received a birthday song in return from staff members. Patient denied SI, HI, SIB and contracted for safety. Patient had a good appetite at supper. Patient complained of  constipation and requested for the PRN suppository that ordered for her. PRN given and patient came back after 10 min's stating that she could not hold the suppository so she pushed it out without any BM. Patient very uncomfortable as she reported no BM for the past 4 days and was requesting for an enema. Po fluid provided and patient was encouraged to drink.  An order for an enema was received from on call MD. Patient had an extra large BM afterwards and felt much relief. Patient was med compliant and she retired after HS medications.

## 2022-08-25 VITALS
DIASTOLIC BLOOD PRESSURE: 84 MMHG | BODY MASS INDEX: 57.69 KG/M2 | TEMPERATURE: 96.8 F | RESPIRATION RATE: 20 BRPM | WEIGHT: 293 LBS | SYSTOLIC BLOOD PRESSURE: 123 MMHG | OXYGEN SATURATION: 93 % | HEART RATE: 93 BPM

## 2022-08-25 PROCEDURE — 250N000013 HC RX MED GY IP 250 OP 250 PS 637: Performed by: CLINICAL NURSE SPECIALIST

## 2022-08-25 PROCEDURE — 99239 HOSP IP/OBS DSCHRG MGMT >30: CPT | Performed by: CLINICAL NURSE SPECIALIST

## 2022-08-25 PROCEDURE — 250N000013 HC RX MED GY IP 250 OP 250 PS 637: Performed by: PSYCHIATRY & NEUROLOGY

## 2022-08-25 RX ADMIN — NICOTINE 1 PATCH: 21 PATCH, EXTENDED RELEASE TRANSDERMAL at 07:57

## 2022-08-25 RX ADMIN — SUCRALFATE 1 G: 1 TABLET ORAL at 07:58

## 2022-08-25 RX ADMIN — LAMOTRIGINE 200 MG: 200 TABLET ORAL at 07:58

## 2022-08-25 RX ADMIN — PANTOPRAZOLE SODIUM 40 MG: 40 TABLET, DELAYED RELEASE ORAL at 07:58

## 2022-08-25 RX ADMIN — SERTRALINE HYDROCHLORIDE 150 MG: 100 TABLET ORAL at 07:58

## 2022-08-25 RX ADMIN — ZIPRASIDONE HYDROCHLORIDE 80 MG: 40 CAPSULE ORAL at 07:57

## 2022-08-25 RX ADMIN — CETIRIZINE HYDROCHLORIDE 10 MG: 10 TABLET, FILM COATED ORAL at 07:58

## 2022-08-25 RX ADMIN — SUCRALFATE 1 G: 1 TABLET ORAL at 12:26

## 2022-08-25 RX ADMIN — SENNOSIDES AND DOCUSATE SODIUM 2 TABLET: 50; 8.6 TABLET ORAL at 07:58

## 2022-08-25 ASSESSMENT — ACTIVITIES OF DAILY LIVING (ADL)
ADLS_ACUITY_SCORE: 21

## 2022-08-25 NOTE — PLAN OF CARE
Problem: Suicidal Behavior  Goal: Suicidal Behavior is Absent or Managed  Outcome: Met   Goal Outcome Evaluation:    Pt discharged home at 2.22 pm. Was picked up by mom who is the guardian. Discharge instructions were reviewed by writer and guardian. Guardian verbalized  understanding discharge orders. Stated that she will try and make sure pt remains safe in the community and that she will take all her meds as ordered. Guardian also stated that they will follow up with aftercare appointments. Pt denied SI, HI, and hallucinations. Pt took all hers personal belongings.

## 2022-08-25 NOTE — DISCHARGE SUMMARY
Psychiatric Discharge Summary    Yasmine Smith MRN# [id number]   Age: 22 year old YOB: 2000     Date of Admission:  8/21/2022  Date of Discharge:  8/25/2022  Admitting Physician:  Alex Ruiz MD  Discharge Physician:  Debra A. Naegele, APRN CNS (Contact: [phone])         Event Leading to Hospitalization:   This patient is a 21 year old  female with history of below diagnoses who presented to ED with active SI and plan to overdose on medications in context of discovering new information about her former boyfriend, new job and associated fears about increased responsibility, and daily delta 8 use. Symptoms and presentation at this time is most consistent with MDD, recurrent, moderate to severe and likely with maladaptive/avoidant behaviors. I have discussed the risks, benefits, and alternatives of PTA medication, which will be continued at this time. Discussed increase in Zoloft with both mother/guardian and patient who are both in agreement with plan to target depressive, anxiety, and PTSD symptoms. Of note, patient reports both auditory and visual hallucinations, though these appear to be a manifestation of Borderline personality disorder rather than 2/2 a primary psychotic illness. Patient will likely benefit from increased MH supports upon discharge, specifically DBT. She was advised to refrain from delta 8 use. Inpatient psychiatric hospitalization is warranted at this time for safety, stabilization, and possible adjustment in medications.       See Admission note by Olivia Willingham MD on 8/21/2022 for additional details.          DIagnoses:   MDD, recurrent, moderate to severe  Borderline Personallity Disorder  CUCO (generalized anxiety disorder)  Panic Disorder w/o agorophobia  Autism spectrum disorder    Posttraumatic stress disorder   Suspect Binge Eating Disorder  Cannabis Use Disorder, moderate to severe (Delta 8)  History of persistent depressive disorder  Other  insomnia  High risk medication use  Nicotine Use Disorder, moderate to severe  BMI 50.0-59.9, adult   WHODAS 2.0 disability assessment 27/48  Hypertrigleridemia  Fragile X Syndrome  Morbid obesity  Mild intellectual disability (full scale IQ of 78 falling in borderline range from testing in 2011)  Fibromyalgia              Labs:     Results for orders placed or performed during the hospital encounter of 08/21/22   Lipid Profile     Status: Abnormal   Result Value Ref Range    Cholesterol 170 <200 mg/dL    Triglycerides 174 (H) <150 mg/dL    Direct Measure HDL 38 (L) >=50 mg/dL    LDL Cholesterol Calculated 97 <=100 mg/dL    Non HDL Cholesterol 132 (H) <130 mg/dL    Narrative    Cholesterol  Desirable:  <200 mg/dL    Triglycerides  Normal:  Less than 150 mg/dL  Borderline High:  150-199 mg/dL  High:  200-499 mg/dL  Very High:  Greater than or equal to 500 mg/dL    Direct Measure HDL  Female:  Greater than or equal to 50 mg/dL   Male:  Greater than or equal to 40 mg/dL    LDL Cholesterol  Desirable:  <100mg/dL  Above Desirable:  100-129 mg/dL   Borderline High:  130-159 mg/dL   High:  160-189 mg/dL   Very High:  >= 190 mg/dL    Non HDL Cholesterol  Desirable:  130 mg/dL  Above Desirable:  130-159 mg/dL  Borderline High:  160-189 mg/dL  High:  190-219 mg/dL  Very High:  Greater than or equal to 220 mg/dL            Consults:   No consultations were requested during this admission         Hospital Course:   Yasmine Smith was admitted to [facility] with attending Samuel Contreras MD as a voluntary patient.  Guardian: Josefina Smith(mom) [phone] patient was placed under status 15 (15 minute checks) to ensure patient safety.     PTA mediations were continued. Sertraline was increased form 50 mg 60 150 mg to address depressive and anxiety symptoms. Provider discussed medication adjustment with patient and with guardian/mother.     Reviewed admission labs: CMP WNL except albumin 3.1 low, protein 6.5 low,  Hcg was negative, TSH 1.03 WNL, COVID negative, CBC with diff WNL, INR 0.99, UTOX positive for cannibinoids      Yasmine Smith did participate in groups and was visible in the milieu.     The patient's symptoms of suicidal ideaiton improved. Patient reports improved mood and denies suicidal thinking.  Patient has protective factors of seeking out treatment and supportive mother.     Yasmine Smith was released to home into mother's care. At the time of discharge Yasmine Smith was determined to not be a danger to herself or others.          Discharge Medications:     Current Discharge Medication List      CONTINUE these medications which have CHANGED    Details   sertraline (ZOLOFT) 50 MG tablet Take 3 tablets (150 mg) by mouth daily  Qty: 90 tablet, Refills: 0    Associated Diagnoses: Major depressive disorder, single episode, moderate (H)         CONTINUE these medications which have NOT CHANGED    Details   acetaminophen (TYLENOL) 325 MG tablet Take 650 mg by mouth every 8 hours as needed for mild pain      cetirizine (ZYRTEC) 10 MG tablet Take 10 mg by mouth daily      cholecalciferol (VITAMIN D3) 1250 mcg (70678 units) capsule Take 1,250 mcg by mouth every 7 days On Sundays      clonazePAM (KLONOPIN) 0.5 MG tablet Take 1 mg by mouth 2 times daily as needed for anxiety      divalproex sodium extended-release (DEPAKOTE ER) 500 MG 24 hr tablet Take 1,500 mg by mouth every evening      etonogestrel (NEXPLANON) 68 MG IMPL 68 mg by Subdermal route      gabapentin (NEURONTIN) 600 MG tablet Take 1 tablet (600 mg) by mouth daily as needed, and take 2 tablets (1200 mg) by mouth at bedtime scheduled      hydrOXYzine (VISTARIL) 50 MG capsule Take 50 mg by mouth every 8 hours as needed for anxiety      lamoTRIgine (LAMICTAL) 200 MG tablet Take 200 mg by mouth 2 times daily      levothyroxine (SYNTHROID/LEVOTHROID) 50 MCG tablet Take 50 mcg by mouth every evening      melatonin 3 MG tablet Take 1 tablet (3 mg)  by mouth nightly as needed for sleep  Qty: 30 tablet, Refills: 1    Associated Diagnoses: Insomnia due to other mental disorder      omeprazole (PRILOSEC) 40 MG DR capsule Take 40 mg by mouth 2 times daily (before meals)      ondansetron (ZOFRAN) 8 MG tablet Take 8 mg by mouth every 8 hours as needed for nausea      polyethylene glycol (MIRALAX) 17 g packet Take 1 packet by mouth daily as needed for constipation      senna (SENOKOT) 8.6 MG tablet Take 1 tablet by mouth 2 times daily      sucralfate (CARAFATE) 1 GM tablet Take 1 g by mouth 4 times daily 14 day course (prescription started 08/17/2022)      traZODone (DESYREL) 50 MG tablet Take 100 mg by mouth At Bedtime      ziprasidone (GEODON) 80 MG capsule Take 1 capsule (80 mg) by mouth daily (with dinner)  Qty: 30 capsule, Refills: 1    Associated Diagnoses: Schizophrenia, unspecified type (H)                  Psychiatric Examination:   Appearance:  awake, alert and adequately groomed  Attitude:  cooperative  Eye Contact:  good  Mood:  good  Affect:  intensity is blunted  Speech:  normal prosody  Psychomotor Behavior:  no evidence of tardive dyskinesia, dystonia, or tics  Thought Process:  goal oriented  Associations:  no loose associations  Thought Content:  no evidence of suicidal ideation or homicidal ideation  Insight:  limited  Judgment:  limited  Oriented to:  time, person, and place  Attention Span and Concentration:  intact  Recent and Remote Memory:  intact  Language: Able to name objects, Able to repeat phrases and Able to read and write  Fund of Knowledge: delayed  Muscle Strength and Tone: normal  Gait and Station: Normal         Discharge Plan:       Further instructions from your care team       Behavioral Discharge Planning and Instructions    Summary: You were admitted on 8/21/2022  due to Anxiety and Suicidal Ideations.  You were treated by Debra Naegele, ARPN and discharged on 08/26/2022 from [facility] to Home    Main Diagnosis:   MDD, recurrent,  moderate to severe  Borderline Personallity Disorder  CUCO (generalized anxiety disorder)  Panic Disorder w/o agorophobia  Autism spectrum disorder    Posttraumatic stress disorder   Suspect Binge Eating Disorder  Cannabis Use Disorder, moderate to severe (Delta 8)  History of persistent depressive disorder  Other insomnia  High risk medication use  Nicotine Use Disorder, moderate to severe  BMI 50.0-59.9, adult   WHODAS 2.0 disability assessment 27/48  Hypertrigleridemia  Fragile X Syndrome  Morbid obesity  Mild intellectual disability (full scale IQ of 78 falling in borderline range from testing in 2011)  [facility]    Health Care Follow-up:   Psychiatry:   Appointment Date/Time: September 8th @ 10:00am      Psychiatrist: Joselyn Mederos    Address: [facility]     Phone Number: [phone]    Individual Therapy:   Appointment Date/Time: September 3rd @ 4:30pm        Therapist: Nedra Prasad MS, LM     Phone Number: [phone]     [facility] : Padmini Alexander, [phone]    Attend all scheduled appointments with your outpatient providers. Call at least 24 hours in advance if you need to reschedule an appointment to ensure continued access to your outpatient providers.     Major Treatments, Procedures and Findings:  You were provided with: a psychiatric assessment, assessed for medical stability, medication evaluation and/or management, and group therapy    Symptoms to Report: feeling more aggressive, increased confusion, losing more sleep, mood getting worse, or thoughts of suicide    Early warning signs can include: increased depression or anxiety sleep disturbances increased thoughts or behaviors of suicide or self-harm  increased unusual thinking, such as paranoia or hearing voices    Safety and Wellness:  Take all medicines as directed.  Make no changes unless your doctor suggests them.      Follow treatment recommendations.  Refrain from alcohol and non-prescribed drugs.  Ask  "your support system to help you reduce your access to items that could harm yourself or others. If there is a concern for safety, call 911.    Resources:   Crisis Intervention: 717.791.1211 or 919-880-1218 (TTY: 335.944.1209).  Call anytime for help.  National [place] on Mental Illness (www.mn.pita.org): 863.488.6090 or 192-620-2027.  National Suicide Prevention Line (www.mentalhealthmn.org): 511-069-SQBY (2173)  Text 4 Life: txt \"LIFE\" to 60564 for immediate support and crisis intervention  Crisis text line: Text \"[place]\" to 649057. Free, confidential, 24/7.  [facility] Crisis Referral Line: Call us at 971-352-INTD (2364) or 569-413-1285    General Medication Instructions:   See your medication sheet(s) for instructions.   Take all medicines as directed.  Make no changes unless your doctor suggests them.   Go to all your doctor visits.  Be sure to have all your required lab tests. This way, your medicines can be refilled on time.  Do not use any drugs not prescribed by your doctor.  Avoid alcohol.    Advance Directives:   Scanned document on file with [employer]? No scanned doc  Is document scanned? Pt states no documents  Honoring Choices Your Rights Handout: Informed and given  Was more information offered? Pt declined    The Treatment team has appreciated the opportunity to work with you. If you have any questions or concerns about your recent admission, you can contact the unit which can receive your call 24 hours a day, 7 days a week. They will be able to get in touch with a Provider if needed. The unit number is [phone] .           Attestation:  The patient has been seen and evaluated by me,  Debra A. Naegele, MILES CNS on 8/25/2022  Discharge summary time > 30 minutes   "

## 2022-08-25 NOTE — PLAN OF CARE
Problem: Sleep Disturbance  Goal: Adequate Sleep/Rest  Outcome: Ongoing, Progressing  Intervention: Promote Sleep/Rest  Sleep/Rest Enhancement:    awakenings minimized    regular sleep/rest pattern promoted    noise level reduced  Patient in bed at beginning of shift, breathing quiet and unlabored. Patient slept through shift. Patient slept 6.25 hours.      No patient complaints or concerns at this time.      No PRNs or snacks given.    Orders Placed This Encounter      Suicide precautions

## 2022-08-26 ENCOUNTER — PATIENT OUTREACH (OUTPATIENT)
Dept: CARE COORDINATION | Facility: CLINIC | Age: 22
End: 2022-08-26

## 2022-08-26 NOTE — PROGRESS NOTES
Clinic Care Coordination Contact  [facility]: Post-Discharge Note  SITUATION                                                      Admission:    Admission Date: 08/21/22   Reason for Admission: active SI and plan to overdose on medications  Discharge:   Discharge Date: 08/25/22  Discharge Diagnosis: active SI and plan to overdose on medications    BACKGROUND                                                      This patient is a 21 year old  female with history of below diagnoses who presented to ED with active SI and plan to overdose on medications in context of discovering new information about her former boyfriend, new job and associated fears about increased responsibility, and daily delta 8 use. Symptoms and presentation at this time is most consistent with MDD, recurrent, moderate to severe and likely with maladaptive/avoidant behaviors. I have discussed the risks, benefits, and alternatives of PTA medication, which will be continued at this time. Discussed increase in Zoloft with both mother/guardian and patient who are both in agreement with plan to target depressive, anxiety, and PTSD symptoms. Of note, patient reports both auditory and visual hallucinations, though these appear to be a manifestation of Borderline personality disorder rather than 2/2 a primary psychotic illness. Patient will likely benefit from increased MH supports upon discharge, specifically DBT. She was advised to refrain from delta 8 use. Inpatient psychiatric hospitalization is warranted at this time for safety, stabilization, and possible adjustment in medications    ASSESSMENT           Discharge Assessment  How are you doing now that you are home?: Spoke with patients Mom(Guardian) who shares that patient is doing much better and is currently sleeping. Declined needs/concerns at this time.  How are your symptoms? (Red Flag symptoms escalate to triage hotline per guidelines): Improved  Do you feel your condition is  stable enough to be safe at home until your provider visit?: Yes  Does the patient have their discharge instructions? : Yes  Does the patient have questions regarding their discharge instructions? : No  Were you started on any new medications or were there changes to any of your previous medications? : Yes  Does the patient have all of their medications?: Yes  Do you have questions regarding any of your medications? : No  Do you have all of your needed medical supplies or equipment (DME)?  (i.e. oxygen tank, CPAP, cane, etc.): Yes  Discharge follow-up appointment scheduled within 14 calendar days? : Yes  Discharge Follow Up Appointment Date: 09/08/22  Discharge Follow Up Appointment Scheduled with?: Specialty Care Provider (Psychiatrist)    Post-op ([facility] Only)  If the patient had a surgery or procedure, do they have any questions for a nurse?: No    Post-op (Clinicians Only)  Did the patient have surgery or a procedure: No  Fever: No  Chills: No        PLAN                                                      Outpatient Plan: Health Care Follow-up:  Psychiatry:  Appointment Date/Time: September 8th @ 10:00am Psychiatrist: Joselyn Mederos  Address: [facility]  Phone Number: [phone]  Individual Therapy:  Appointment Date/Time: September 3rd @ 4:30pm Therapist: Nedra Prasad MS, [facility]  Phone Number: [phone]  [facility] : Pamdini Alexander, [phone]    No future appointments.      For any urgent concerns, please contact our 24 hour nurse triage line: [phone] ([phone])         CLOVER Chawla

## 2023-05-25 ENCOUNTER — HOSPITAL ENCOUNTER (EMERGENCY)
Facility: CLINIC | Age: 23
Discharge: HOME | End: 2023-05-25
Attending: FAMILY MEDICINE | Admitting: FAMILY MEDICINE
Payer: MEDICAID

## 2023-05-25 VITALS
RESPIRATION RATE: 22 BRPM | DIASTOLIC BLOOD PRESSURE: 70 MMHG | WEIGHT: 293 LBS | HEIGHT: 66 IN | SYSTOLIC BLOOD PRESSURE: 140 MMHG | TEMPERATURE: 97.8 F | BODY MASS INDEX: 47.09 KG/M2 | HEART RATE: 121 BPM | OXYGEN SATURATION: 98 %

## 2023-05-25 DIAGNOSIS — F32.9 REACTIVE DEPRESSION: ICD-10-CM

## 2023-05-25 LAB
ALBUMIN SERPL BCG-MCNC: 4 G/DL (ref 3.5–5.2)
ALBUMIN UR-MCNC: 100 MG/DL
ALP SERPL-CCNC: 60 U/L (ref 35–104)
ALT SERPL W P-5'-P-CCNC: 18 U/L (ref 10–35)
AMPHETAMINES UR QL: NOT DETECTED
ANION GAP SERPL CALCULATED.3IONS-SCNC: 10 MMOL/L (ref 7–15)
APAP SERPL-MCNC: <5 UG/ML (ref 10–30)
APPEARANCE UR: ABNORMAL
AST SERPL W P-5'-P-CCNC: 23 U/L (ref 10–35)
BACTERIA #/AREA URNS HPF: ABNORMAL /HPF
BARBITURATES UR QL SCN: NOT DETECTED
BASOPHILS # BLD AUTO: 0 10E3/UL (ref 0–0.2)
BASOPHILS NFR BLD AUTO: 0 %
BENZODIAZ UR QL SCN: NOT DETECTED
BILIRUB SERPL-MCNC: <0.2 MG/DL
BILIRUB UR QL STRIP: NEGATIVE
BUN SERPL-MCNC: 9.2 MG/DL (ref 6–20)
BUPRENORPHINE UR QL: NOT DETECTED
CALCIUM SERPL-MCNC: 9.2 MG/DL (ref 8.6–10)
CANNABINOIDS UR QL: DETECTED
CHLORIDE SERPL-SCNC: 106 MMOL/L (ref 98–107)
COCAINE UR QL SCN: NOT DETECTED
COLOR UR AUTO: YELLOW
CREAT SERPL-MCNC: 0.87 MG/DL (ref 0.51–0.95)
D-METHAMPHET UR QL: NOT DETECTED
DEPRECATED HCO3 PLAS-SCNC: 23 MMOL/L (ref 22–29)
EOSINOPHIL # BLD AUTO: 0.1 10E3/UL (ref 0–0.7)
EOSINOPHIL NFR BLD AUTO: 1 %
ERYTHROCYTE [DISTWIDTH] IN BLOOD BY AUTOMATED COUNT: 12.3 % (ref 10–15)
ETHANOL SERPL-MCNC: <0.01 G/DL
GFR SERPL CREATININE-BSD FRML MDRD: >90 ML/MIN/1.73M2
GLUCOSE SERPL-MCNC: 114 MG/DL (ref 70–99)
GLUCOSE UR STRIP-MCNC: NEGATIVE MG/DL
HCG UR QL: NEGATIVE
HCT VFR BLD AUTO: 46.5 % (ref 35–47)
HGB BLD-MCNC: 15.2 G/DL (ref 11.7–15.7)
HGB UR QL STRIP: ABNORMAL
IMM GRANULOCYTES # BLD: 0 10E3/UL
IMM GRANULOCYTES NFR BLD: 0 %
KETONES UR STRIP-MCNC: NEGATIVE MG/DL
LEUKOCYTE ESTERASE UR QL STRIP: NEGATIVE
LYMPHOCYTES # BLD AUTO: 3.4 10E3/UL (ref 0.8–5.3)
LYMPHOCYTES NFR BLD AUTO: 30 %
MCH RBC QN AUTO: 29.6 PG (ref 26.5–33)
MCHC RBC AUTO-ENTMCNC: 32.7 G/DL (ref 31.5–36.5)
MCV RBC AUTO: 91 FL (ref 78–100)
METHADONE UR QL SCN: NOT DETECTED
MONOCYTES # BLD AUTO: 1.2 10E3/UL (ref 0–1.3)
MONOCYTES NFR BLD AUTO: 10 %
MUCOUS THREADS #/AREA URNS LPF: PRESENT /LPF
NEUTROPHILS # BLD AUTO: 6.6 10E3/UL (ref 1.6–8.3)
NEUTROPHILS NFR BLD AUTO: 59 %
NITRATE UR QL: NEGATIVE
NRBC # BLD AUTO: 0 10E3/UL
NRBC BLD AUTO-RTO: 0 /100
OPIATES UR QL SCN: NOT DETECTED
OXYCODONE UR QL SCN: NOT DETECTED
PCP UR QL SCN: NOT DETECTED
PH UR STRIP: 5 [PH] (ref 5–7)
PLATELET # BLD AUTO: 217 10E3/UL (ref 150–450)
POTASSIUM SERPL-SCNC: 4.3 MMOL/L (ref 3.4–5.3)
PROPOXYPH UR QL: NOT DETECTED
PROT SERPL-MCNC: 7 G/DL (ref 6.4–8.3)
RBC # BLD AUTO: 5.13 10E6/UL (ref 3.8–5.2)
RBC URINE: >182 /HPF
SALICYLATES SERPL-MCNC: <0.3 MG/DL
SODIUM SERPL-SCNC: 139 MMOL/L (ref 136–145)
SP GR UR STRIP: 1.02 (ref 1–1.03)
SQUAMOUS EPITHELIAL: 3 /HPF
TRICYCLICS UR QL SCN: NOT DETECTED
TSH SERPL DL<=0.005 MIU/L-ACNC: 1.81 UIU/ML (ref 0.3–4.2)
UROBILINOGEN UR STRIP-MCNC: NORMAL MG/DL
WBC # BLD AUTO: 11.4 10E3/UL (ref 4–11)
WBC URINE: 3 /HPF

## 2023-05-25 PROCEDURE — 36415 COLL VENOUS BLD VENIPUNCTURE: CPT | Performed by: FAMILY MEDICINE

## 2023-05-25 PROCEDURE — 84443 ASSAY THYROID STIM HORMONE: CPT | Performed by: FAMILY MEDICINE

## 2023-05-25 PROCEDURE — 90791 PSYCH DIAGNOSTIC EVALUATION: CPT

## 2023-05-25 PROCEDURE — 90715 TDAP VACCINE 7 YRS/> IM: CPT | Performed by: FAMILY MEDICINE

## 2023-05-25 PROCEDURE — 99284 EMERGENCY DEPT VISIT MOD MDM: CPT | Performed by: FAMILY MEDICINE

## 2023-05-25 PROCEDURE — 250N000013 HC RX MED GY IP 250 OP 250 PS 637: Performed by: FAMILY MEDICINE

## 2023-05-25 PROCEDURE — 81001 URINALYSIS AUTO W/SCOPE: CPT | Performed by: FAMILY MEDICINE

## 2023-05-25 PROCEDURE — 80306 DRUG TEST PRSMV INSTRMNT: CPT | Performed by: FAMILY MEDICINE

## 2023-05-25 PROCEDURE — 80053 COMPREHEN METABOLIC PANEL: CPT | Performed by: FAMILY MEDICINE

## 2023-05-25 PROCEDURE — 80179 DRUG ASSAY SALICYLATE: CPT | Performed by: FAMILY MEDICINE

## 2023-05-25 PROCEDURE — 80143 DRUG ASSAY ACETAMINOPHEN: CPT | Performed by: FAMILY MEDICINE

## 2023-05-25 PROCEDURE — 85025 COMPLETE CBC W/AUTO DIFF WBC: CPT | Performed by: FAMILY MEDICINE

## 2023-05-25 PROCEDURE — 82077 ASSAY SPEC XCP UR&BREATH IA: CPT | Performed by: FAMILY MEDICINE

## 2023-05-25 PROCEDURE — 90471 IMMUNIZATION ADMIN: CPT | Performed by: FAMILY MEDICINE

## 2023-05-25 PROCEDURE — 250N000011 HC RX IP 250 OP 636: Performed by: FAMILY MEDICINE

## 2023-05-25 PROCEDURE — 99285 EMERGENCY DEPT VISIT HI MDM: CPT | Mod: 25 | Performed by: FAMILY MEDICINE

## 2023-05-25 PROCEDURE — 81025 URINE PREGNANCY TEST: CPT | Performed by: FAMILY MEDICINE

## 2023-05-25 RX ORDER — PRAZOSIN HYDROCHLORIDE 2 MG/1
2-6 CAPSULE ORAL AT BEDTIME
COMMUNITY
Start: 2023-03-24 | End: 2024-03-23

## 2023-05-25 RX ORDER — CHOLECALCIFEROL (VITAMIN D3) 50 MCG
1 CAPSULE ORAL EVERY MORNING
COMMUNITY
Start: 2023-02-20

## 2023-05-25 RX ORDER — ALBUTEROL SULFATE 90 UG/1
1-2 AEROSOL, METERED RESPIRATORY (INHALATION) EVERY 4 HOURS PRN
COMMUNITY
Start: 2023-03-24

## 2023-05-25 RX ORDER — TOPIRAMATE 50 MG/1
50 TABLET, FILM COATED ORAL AT BEDTIME
COMMUNITY
Start: 2022-11-15 | End: 2023-11-15

## 2023-05-25 RX ORDER — CLONAZEPAM 1 MG/1
1 TABLET ORAL 2 TIMES DAILY PRN
COMMUNITY
Start: 2023-04-26

## 2023-05-25 RX ORDER — SERTRALINE HYDROCHLORIDE 100 MG/1
50 TABLET, FILM COATED ORAL AT BEDTIME
COMMUNITY
Start: 2023-03-23

## 2023-05-25 RX ORDER — ACETAMINOPHEN 325 MG/1
975 TABLET ORAL ONCE
Status: COMPLETED | OUTPATIENT
Start: 2023-05-25 | End: 2023-05-25

## 2023-05-25 RX ORDER — SUMATRIPTAN 50 MG/1
50 TABLET, FILM COATED ORAL PRN
COMMUNITY
Start: 2022-11-15 | End: 2023-11-15

## 2023-05-25 RX ADMIN — CLOSTRIDIUM TETANI TOXOID ANTIGEN (FORMALDEHYDE INACTIVATED), CORYNEBACTERIUM DIPHTHERIAE TOXOID ANTIGEN (FORMALDEHYDE INACTIVATED), BORDETELLA PERTUSSIS TOXOID ANTIGEN (GLUTARALDEHYDE INACTIVATED), BORDETELLA PERTUSSIS FILAMENTOUS HEMAGGLUTININ ANTIGEN (FORMALDEHYDE INACTIVATED), BORDETELLA PERTUSSIS PERTACTIN ANTIGEN, AND BORDETELLA PERTUSSIS FIMBRIAE 2/3 ANTIGEN 0.5 ML: 5; 2; 2.5; 5; 3; 5 INJECTION, SUSPENSION INTRAMUSCULAR at 19:36

## 2023-05-25 RX ADMIN — ACETAMINOPHEN 975 MG: 325 TABLET ORAL at 20:20

## 2023-05-25 ASSESSMENT — ENCOUNTER SYMPTOMS
CONSTIPATION: 1
HEMATOLOGIC/LYMPHATIC NEGATIVE: 1
NEUROLOGICAL NEGATIVE: 1
APPETITE CHANGE: 1
MUSCULOSKELETAL NEGATIVE: 1
DYSPHORIC MOOD: 1
EYES NEGATIVE: 1
CHILLS: 0
WOUND: 1
FEVER: 0
SHORTNESS OF BREATH: 1
CARDIOVASCULAR NEGATIVE: 1

## 2023-05-25 ASSESSMENT — COLUMBIA-SUICIDE SEVERITY RATING SCALE - C-SSRS
1. IN THE PAST MONTH, HAVE YOU WISHED YOU WERE DEAD OR WISHED YOU COULD GO TO SLEEP AND NOT WAKE UP?: YES
TOTAL  NUMBER OF ABORTED OR SELF INTERRUPTED ATTEMPTS LIFETIME: NO
TOTAL  NUMBER OF INTERRUPTED ATTEMPTS LIFETIME: NO
REASONS FOR IDEATION LIFETIME: COMPLETELY TO GET ATTENTION, REVENGE, OR A REACTION FROM OTHERS
1. HAVE YOU WISHED YOU WERE DEAD OR WISHED YOU COULD GO TO SLEEP AND NOT WAKE UP?: YES
ATTEMPT LIFETIME: NO
REASONS FOR IDEATION PAST MONTH: COMPLETELY TO GET ATTENTION, REVENGE, OR A REACTION FROM OTHERS
2. HAVE YOU ACTUALLY HAD ANY THOUGHTS OF KILLING YOURSELF?: NO
6. HAVE YOU EVER DONE ANYTHING, STARTED TO DO ANYTHING, OR PREPARED TO DO ANYTHING TO END YOUR LIFE?: NO

## 2023-05-25 ASSESSMENT — ACTIVITIES OF DAILY LIVING (ADL)
ADLS_ACUITY_SCORE: 33
ADLS_ACUITY_SCORE: 35
ADLS_ACUITY_SCORE: 35

## 2023-05-25 NOTE — ED TRIAGE NOTES
Pt presents with father over depression.  Pt and her mother had an escalation, 911 was called.  Pt was released to mother. Pt was to go to work with mother and father was to pick her up to bring to the ED.   Pt does have a new therapist- [facility] in [place].  Pt was cutting her right forearm today.   Pt states that she has had multiple triggers over the last few months, depressed today and thoughts of suicidal thoughts.  Plan was to overdose.  Pt states that she cut with a screw her forearm, stated she needs a tetanus shot.      Triage Assessment     Row Name 05/25/23 6669       Triage Assessment (Adult)    Airway WDL WDL       Respiratory WDL    Respiratory WDL WDL       Skin Circulation/Temperature WDL    Skin Circulation/Temperature WDL WDL       Cardiac WDL    Cardiac WDL WDL       Peripheral/Neurovascular WDL    Peripheral Neurovascular WDL WDL       Cognitive/Neuro/Behavioral WDL    Cognitive/Neuro/Behavioral WDL WDL

## 2023-05-26 NOTE — MEDICATION SCRIBE - ADMISSION MEDICATION HISTORY
Medication Scribe Admission Medication History    Admission medication history is complete. The information provided in this note is only as accurate as the sources available at the time of the update.    Medication reconciliation/reorder completed by provider prior to medication history? No    Information Source(s): Family member MOM: Josefina via phone [phone]    Pertinent Information: Patient's father reports that patient has reported taking yesterday's HS medications this morning and skipped this morning's medications. This information was verified by patient's mom via phone conversation while obtaining last dose information. MomJosefina is legal guardian.     Changes made to PTA medication list:    Added: Ventolin Inhaler as per outside source and patient's mom report    Sumatriptan 50 mg as per outside source and patient's mom report    Topiramate 50 mg as per outside source and patient's mom report    Vortioxetine 5 mg as per outside source and patient's mom report    Prazosin 2 mg as per outside source and patient's mom report    Deleted: Melatonin 3 mg as reported by patient's mom; pt stopped, alternate therapy     Sucralfate 1 gm; therapy completed as per patient's mom     Changed: Sertraline 50 mg to 100 mg tablets; taking 50 mg (1/2 tablet) as per dispense report and patient's mom    Cholecalciferol 1250 mcg to 50 mcg as per dispense history and patient's mom      Clonasepam 0.5 mg to 1 mg as per dispense history and patient's mom report    Medication Affordability:  Not including over the counter (OTC) medications, was there a time in the past 3 months when you did not take your medications as prescribed because of cost?: No    Allergies reviewed with patient and updates made in EHR: yes    Medication History Completed By: MARLINE ZARATE 5/25/2023 8:39 PM    Prior to Admission medications    Medication Sig Last Dose Taking? Auth Provider Long Term End Date   acetaminophen (TYLENOL) 325 MG tablet Take  650 mg by mouth every 8 hours as needed for mild pain 5/24/2023 at afternoon Yes Unknown, Entered By History     albuterol (VENTOLIN HFA) 108 (90 Base) MCG/ACT inhaler Inhale 1-2 puffs into the lungs every 4 hours as needed Past Week Yes Reported, Patient Yes    cetirizine (ZYRTEC) 10 MG tablet Take 10 mg by mouth daily 5/24/2023 at am Yes Reported, Patient     clonazePAM (KLONOPIN) 1 MG tablet Take 1 mg by mouth 2 times daily as needed for anxiety 5/24/2023 at afternoon Yes Reported, Patient     divalproex sodium extended-release (DEPAKOTE ER) 500 MG 24 hr tablet Take 1,500 mg by mouth At Bedtime 5/25/2023 at am Yes Reported, Patient     etonogestrel (NEXPLANON) 68 MG IMPL 68 mg by Subdermal route  at current Yes Reported, Patient Yes 10/23/23   gabapentin (NEURONTIN) 600 MG tablet Take 1,200 mg by mouth At Bedtime 5/25/2023 at am Yes Unknown, Entered By History     hydrOXYzine (VISTARIL) 50 MG capsule Take 50 mg by mouth every 8 hours as needed for anxiety Past Week Yes Unknown, Entered By History     lamoTRIgine (LAMICTAL) 200 MG tablet Take 200 mg by mouth 2 times daily 5/25/2023 at am Yes Reported, Patient     levothyroxine (SYNTHROID/LEVOTHROID) 50 MCG tablet Take 50 mcg by mouth every morning 5/24/2023 at am Yes Reported, Patient     omeprazole (PRILOSEC) 40 MG DR capsule Take 40 mg by mouth 2 times daily (before meals) 5/25/2023 at am Yes Reported, Patient  7/11/23   ondansetron (ZOFRAN) 8 MG tablet Take 8 mg by mouth every 8 hours as needed for nausea More than a month Yes Unknown, Entered By History     polyethylene glycol (MIRALAX) 17 g packet Take 1 packet by mouth daily as needed for constipation 5/24/2023 at am Yes Unknown, Entered By History     prazosin (MINIPRESS) 2 MG capsule Take 2-6 mg by mouth At Bedtime 5/25/2023 at am Yes Reported, Patient Yes 3/23/24   senna (SENOKOT) 8.6 MG tablet Take 1 tablet by mouth 2 times daily 5/25/2023 at am Yes Reported, Patient     sertraline (ZOLOFT) 100 MG tablet  Take 50 mg by mouth At Bedtime 5/25/2023 at m Yes Reported, Patient Yes    SUMAtriptan (IMITREX) 50 MG tablet Take 50 mg by mouth as needed 1 tablet at onset of headaches; may repeat in two hours as needed Past Week Yes Reported, Patient  11/15/23   topiramate (TOPAMAX) 50 MG tablet Take 50 mg by mouth At Bedtime 5/25/2023 at am Yes Reported, Patient Yes 11/15/23   traZODone (DESYREL) 50 MG tablet Take 150 mg by mouth At Bedtime 5/25/2023 at am Yes Unknown, Entered By History No    VITAMIN D3 SUPER STRENGTH 50 MCG (2000 UT) CAPS Take 1 capsule by mouth every morning 5/24/2023 at am Yes Reported, Patient     vortioxetine (TRINTELLIX) 5 MG tablet Take 5 mg by mouth every morning 5/24/2023 at am Yes Reported, Patient     ziprasidone (GEODON) 80 MG capsule Take 1 capsule (80 mg) by mouth daily (with dinner)  Patient taking differently: Take 80 mg by mouth 2 times daily (with meals) Breakfast and supper 5/25/2023 at am Yes Naegele, Debra Ann, APRN CNS Yes

## 2023-05-26 NOTE — ED PROVIDER NOTES
History     Chief Complaint   Patient presents with     Suicidal     HPI  Yasmine Smith is a 22 year old female who presents to the ER with concerns about increased depression symptoms and suicidal ideation.  Patient states that she has had increased sadness over the recent finding that her friend of 13 years has passed away.  She also had her dogs taken away from her because they were too destructive in the home.  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  She has a long history for anxiety and depression issues.  She also has a history for autism.  Patient is on multiple medications and has been taking those as directed but did take her nighttime medicines today at 1030 for some reason.  She also was cutting on her right forearm today to try to relieve some of the stress she is feeling.  She stated that she got into an verbal argument with her mother which led to the cutting on her right arm.  She states that she has had a history for cutting but had not been cutting on herself for a long time until today.  Her father states that he has her current medication list which is different from the one that we have currently in our EMR.  Patient admits to continued thoughts of self-harm.  Her plan is to take an overdose of the medication she has access to in her home.      I reviewed the following nurse triage note:  Pt presents with father over depression.  Pt and her mother had an escalation, 911 was called.  Pt was released to mother. Pt was to go to work with mother and father was to pick her up to bring to the ED.   Pt does have a new therapist- [facility] in [place].  Pt was cutting her right forearm today.   Pt states that she has had multiple triggers over the last few months, depressed today and thoughts of suicidal thoughts.  Plan was to overdose.  Pt states that she cut with a screw her forearm, stated  she needs a tetanus shot.     Allergies:  Allergies   Allergen Reactions     Aripiprazole Other (See Comments)     Restlessness     Banana Swelling     Throat swells/ puffy     Food Swelling     Cantalope,Strawberries, Pineapple, Kiwi, Grapes causes swelling/puffiness of throat       Mirtazapine Other (See Comments)     Weight gain     Olanzapine Other (See Comments)     Weight gain  And irritability     Risperidone Other (See Comments)     Breast tenderness       Problem List:    Patient Active Problem List    Diagnosis Date Noted     Pyelonephritis, acute 08/10/2013     Priority: High     Suicidal ideation 08/21/2022     Priority: Medium     Schizophrenia (H) 05/01/2022     Priority: Medium     Headache 04/06/2018     Priority: Medium     Closed fracture of metacarpal bone 04/06/2018     Priority: Medium     Abnormal magnetic resonance imaging of bone 04/06/2018     Priority: Medium     Hypokalemia 08/10/2013     Priority: Medium     Nausea & vomiting 08/10/2013     Priority: Medium        Past Medical History:    Past Medical History:   Diagnosis Date     Anxiety disorder      Autism      Bipolar disorder (H)        Past Surgical History:    Past Surgical History:   Procedure Laterality Date     ENT SURGERY      tonsils, adenoids, turbulance       Family History:    No family history on file.    Social History:  Marital Status:  Single [1]  Social History     Tobacco Use     Smoking status: Every Day     Packs/day: 1.00     Types: Cigarettes     Smokeless tobacco: Never   Substance Use Topics     Alcohol use: Yes     Comment: rarely     Drug use: Yes     Types: Marijuana        Medications:    acetaminophen (TYLENOL) 325 MG tablet  albuterol (VENTOLIN HFA) 108 (90 Base) MCG/ACT inhaler  cetirizine (ZYRTEC) 10 MG tablet  clonazePAM (KLONOPIN) 1 MG tablet  divalproex sodium extended-release (DEPAKOTE ER) 500 MG 24 hr tablet  etonogestrel (NEXPLANON) 68 MG IMPL  gabapentin (NEURONTIN) 600 MG tablet  hydrOXYzine  "(VISTARIL) 50 MG capsule  lamoTRIgine (LAMICTAL) 200 MG tablet  levothyroxine (SYNTHROID/LEVOTHROID) 50 MCG tablet  omeprazole (PRILOSEC) 40 MG DR capsule  ondansetron (ZOFRAN) 8 MG tablet  polyethylene glycol (MIRALAX) 17 g packet  prazosin (MINIPRESS) 2 MG capsule  senna (SENOKOT) 8.6 MG tablet  sertraline (ZOLOFT) 100 MG tablet  SUMAtriptan (IMITREX) 50 MG tablet  topiramate (TOPAMAX) 50 MG tablet  traZODone (DESYREL) 50 MG tablet  VITAMIN D3 SUPER STRENGTH 50 MCG (2000 UT) CAPS  vortioxetine (TRINTELLIX) 5 MG tablet  ziprasidone (GEODON) 80 MG capsule          Review of Systems   Constitutional: Positive for appetite change. Negative for chills and fever.   HENT: Positive for congestion.    Eyes: Negative.    Respiratory: Positive for shortness of breath.    Cardiovascular: Negative.    Gastrointestinal: Positive for constipation (Chronic).   Genitourinary: Negative.    Musculoskeletal: Negative.    Skin: Positive for wound (right forearm - self induced earlier today).   Neurological: Negative.    Hematological: Negative.    Psychiatric/Behavioral: Positive for dysphoric mood and suicidal ideas.   All other systems reviewed and are negative.      Physical Exam   BP:  (declined automatic blood pressure machine)  Pulse: (!) 121  Temp: 97.8  F (36.6  C)  Resp: 22  Height: 167.6 cm (5' 6\")  Weight: (!) 157.9 kg (348 lb)  SpO2: 98 %      Physical Exam  Vitals and nursing note reviewed. Exam conducted with a chaperone present (Father).   Constitutional:       General: She is in acute distress.      Appearance: She is not toxic-appearing or diaphoretic.   HENT:      Head: Normocephalic and atraumatic.      Nose: Congestion present.      Mouth/Throat:      Mouth: Mucous membranes are moist.   Eyes:      Extraocular Movements: Extraocular movements intact.      Conjunctiva/sclera: Conjunctivae normal.      Pupils: Pupils are equal, round, and reactive to light.   Cardiovascular:      Rate and Rhythm: Normal rate.      " Pulses: Normal pulses.      Heart sounds: No murmur heard.     Comments: Patient's initial rate was elevated per nursing staff but it was at 90 bpm when I auscultated her heart on exam.  Pulmonary:      Effort: Pulmonary effort is normal.      Breath sounds: Normal breath sounds.      Comments: Patient with a smell of cigarette smoke on her breath but otherwise lungs were clear to auscultation bilaterally.  She did not appear to be in any respiratory distress.  Abdominal:      Comments: Morbidly obese abdomen nontender.   Musculoskeletal:         General: Normal range of motion.      Cervical back: Normal range of motion and neck supple.   Skin:     Capillary Refill: Capillary refill takes less than 2 seconds.      Findings: No bruising.      Comments: Superficial abrasions noted to the right forearm.  See picture below.   Neurological:      Mental Status: She is alert and oriented to person, place, and time.   Psychiatric:         Mood and Affect: Mood is depressed. Affect is tearful.         Speech: Speech normal.         Behavior: Behavior is not agitated or aggressive. Behavior is cooperative.         Thought Content: Thought content includes suicidal ideation. Thought content includes suicidal plan.             ED Course     Mental Health Risk Assessment      PSS-3    Date and Time Over the past 2 weeks have you felt down, depressed, or hopeless? Over the past 2 weeks have you had thoughts of killing yourself? Have you ever attempted to kill yourself? When did this last happen? User   05/25/23 1855 yes yes yes -- NM      C-SSRS ([facility] [place])    Date and Time Q1 Wished to be Dead (Past Month) Q2 Suicidal Thoughts (Past Month) Q3 Suicidal Thought Method Q4 Suicidal Intent without Specific Plan Q5 Suicide Intent with Specific Plan Q6 Suicide Behavior (Lifetime) Within the Past 3 Months? RETIRED: Level of Risk per Screen Screening Not Complete User   05/25/23 1855 yes yes yes no yes yes -- -- -- NM               Suicide assessment completed by mental health (D.EDavidC., LCSW, etc.)       Procedures              Critical Care time:  none               Results for orders placed or performed during the hospital encounter of 05/25/23 (from the past 24 hour(s))   CBC with platelets differential    Narrative    The following orders were created for panel order CBC with platelets differential.  Procedure                               Abnormality         Status                     ---------                               -----------         ------                     CBC with platelets and d...[[id number]]  Abnormal            Final result                 Please view results for these tests on the individual orders.   Acetaminophen level   Result Value Ref Range    Acetaminophen <5.0 (L) 10.0 - 30.0 ug/mL   Salicylate level   Result Value Ref Range    Salicylate <0.3   mg/dL   Ethyl Alcohol Level   Result Value Ref Range    Alcohol ethyl <0.01 <=0.01 g/dL   Comprehensive metabolic panel   Result Value Ref Range    Sodium 139 136 - 145 mmol/L    Potassium 4.3 3.4 - 5.3 mmol/L    Chloride 106 98 - 107 mmol/L    Carbon Dioxide (CO2) 23 22 - 29 mmol/L    Anion Gap 10 7 - 15 mmol/L    Urea Nitrogen 9.2 6.0 - 20.0 mg/dL    Creatinine 0.87 0.51 - 0.95 mg/dL    Calcium 9.2 8.6 - 10.0 mg/dL    Glucose 114 (H) 70 - 99 mg/dL    Alkaline Phosphatase 60 35 - 104 U/L    AST 23 10 - 35 U/L    ALT 18 10 - 35 U/L    Protein Total 7.0 6.4 - 8.3 g/dL    Albumin 4.0 3.5 - 5.2 g/dL    Bilirubin Total <0.2 <=1.2 mg/dL    GFR Estimate >90 >60 mL/min/1.73m2   TSH with free T4 reflex   Result Value Ref Range    TSH 1.81 0.30 - 4.20 uIU/mL   CBC with platelets and differential   Result Value Ref Range    WBC Count 11.4 (H) 4.0 - 11.0 10e3/uL    RBC Count 5.13 3.80 - 5.20 10e6/uL    Hemoglobin 15.2 11.7 - 15.7 g/dL    Hematocrit 46.5 35.0 - 47.0 %    MCV 91 78 - 100 fL    MCH 29.6 26.5 - 33.0 pg    MCHC 32.7 31.5 - 36.5 g/dL    RDW 12.3 10.0 - 15.0 %     Platelet Count 217 150 - 450 10e3/uL    % Neutrophils 59 %    % Lymphocytes 30 %    % Monocytes 10 %    % Eosinophils 1 %    % Basophils 0 %    % Immature Granulocytes 0 %    NRBCs per 100 WBC 0 <1 /100    Absolute Neutrophils 6.6 1.6 - 8.3 10e3/uL    Absolute Lymphocytes 3.4 0.8 - 5.3 10e3/uL    Absolute Monocytes 1.2 0.0 - 1.3 10e3/uL    Absolute Eosinophils 0.1 0.0 - 0.7 10e3/uL    Absolute Basophils 0.0 0.0 - 0.2 10e3/uL    Absolute Immature Granulocytes 0.0 <=0.4 10e3/uL    Absolute NRBCs 0.0 10e3/uL   UA with Microscopic reflex to Culture    Specimen: Urine, Midstream   Result Value Ref Range    Color Urine Yellow Colorless, Straw, Light Yellow, Yellow    Appearance Urine Slightly Cloudy (A) Clear    Glucose Urine Negative Negative mg/dL    Bilirubin Urine Negative Negative    Ketones Urine Negative Negative mg/dL    Specific Gravity Urine 1.023 1.003 - 1.035    Blood Urine Large (A) Negative    pH Urine 5.0 5.0 - 7.0    Protein Albumin Urine 100 (A) Negative mg/dL    Urobilinogen Urine Normal Normal, 2.0 mg/dL    Nitrite Urine Negative Negative    Leukocyte Esterase Urine Negative Negative    Bacteria Urine Few (A) None Seen /HPF    Mucus Urine Present (A) None Seen /LPF    RBC Urine >182 (H) <=2 /HPF    WBC Urine 3 <=5 /HPF    Squamous Epithelials Urine 3 (H) <=1 /HPF    Narrative    Urine Culture not indicated   HCG qualitative urine (UPT)   Result Value Ref Range    hCG Urine Qualitative Negative Negative   Urine Drugs of Abuse Screen    Narrative    The following orders were created for panel order Urine Drugs of Abuse Screen.  Procedure                               Abnormality         Status                     ---------                               -----------         ------                     Urine Drugs of Abuse Scr...[[id number]]  Abnormal            Final result                 Please view results for these tests on the individual orders.   Urine Drugs of Abuse Screen Panel 13   Result Value Ref  Range    Cannabinoids (55-yei-8-carboxy-9-THC) Detected (A) Not Detected, Indeterminate    Phencyclidine Not Detected Not Detected, Indeterminate    Cocaine (Benzoylecgonine) Not Detected Not Detected, Indeterminate    Methamphetamine (d-Methamphetamine) Not Detected Not Detected, Indeterminate    Opiates (Morphine) Not Detected Not Detected, Indeterminate    Amphetamine (d-Amphetamine) Not Detected Not Detected, Indeterminate    Benzodiazepines (Nordiazepam) Not Detected Not Detected, Indeterminate    Tricyclic Antidepressants (Desipramine) Not Detected Not Detected, Indeterminate    Methadone Not Detected Not Detected, Indeterminate    Barbiturates (Butalbital) Not Detected Not Detected, Indeterminate    Oxycodone Not Detected Not Detected, Indeterminate    Propoxyphene (Norpropoxyphene) Not Detected Not Detected, Indeterminate    Buprenorphine Not Detected Not Detected, Indeterminate       Medications   Tdap (tetanus-diphtheria-acell pertussis) (ADACEL) injection 0.5 mL (0.5 mLs Intramuscular $Given 5/25/23 1936)   acetaminophen (TYLENOL) tablet 975 mg (975 mg Oral $Given 5/25/23 2020)       Assessments & Plan (with Medical Decision Making)  22-year-old to the ER with her father secondary to concerns of increased thoughts of depression associated with a loss of a friend, her dogs, and 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  Patient had a verbal argument with her mother today which caused her to cut on her right forearm and contemplate self-harm with drug overdose.  Patient had improved some during the ER stay.  She was eventually evaluated by the DEC service who felt the patient was safe to return home.  After reexamination I also felt that the patient is safe to return home to care of her parents.  Patient was scheduled for follow-up appointment this week.  We discussed ways of keeping the abrasions in the right arm free of infection.  Patient was sent home with handouts discussing  reactive depression depression as well as how to care for superficial abrasions.  Patient branden on her tetanus immunization today.  Patient discharged in the care of her father.  Please refer to the DEC consultation notes and discharge instructions in the AVS.     I have reviewed the nursing notes.    I have reviewed the findings, diagnosis, plan and need for follow up with the patient.           Medical Decision Making  The patient's presentation was of moderate complexity (an acute illness with systemic symptoms).    The patient's evaluation involved:  independent interpretation of testing performed by another health professional (DEC service consultation-see their documentation.)    The patient's management necessitated only low risk treatment.                 I verbally discussed the findings of the evaluation today in the ER. I have verbally discussed with Yasmine the suggested treatment(s) as described in the discharge instructions and handouts.      I have verbally suggested she follow-up in her clinic or return to the ER for increased symptoms. See the follow-up recommendations documented  in the after visit summary in this visit's EPIC chart.      Disclaimer: This note consists of words and symbols derived from keyboarding and dictation using voice recognition software.  As a result, there may be errors that have gone undetected.  Please consider this when interpreting information found in this note.    Final diagnoses:   Reactive depression       5/25/2023   [facility] EMERGENCY DEPT     Jacob Kelley,   05/26/23 0241

## 2023-05-26 NOTE — ED NOTES
Belongings secured in locker #4.  Pt her purse as well as clothing.  Pt does have a zipper hoodie, no strings and no pockets.  Also has kept her fleece blanket, verified its safety.  Pt states that she has sensory issues.  Pt declined blood pressure as it was an automatic one.

## 2023-05-26 NOTE — DISCHARGE INSTRUCTIONS
Aftercare Plan  Date: Friday, 6/2/2023  Time: 3:00 pm - 4:00 pm  Provider: Skye HUTCHINS PA-C  Location: [facility], [address] [place] [address]  Phone: [phone]  Type: Telepsychiatry  Patient Instructions  VIRTUAL APPOINTMENTS ONLY!!! NO IN PERSON!!! NEW PATIENT PAPERWORK WILL NEED TO BE COMPLETED 24 HRS BEFORE APPOINTMENT... EMAIL IS REQUIRED TO SCHEDULE-    If I am feeling unsafe or I am in a crisis, I will:   Contact my established care providers   Call the National Suicide Prevention Lifeline: 621.544.8394   Go to the nearest emergency room   Call 911     Warning signs that I or other people might notice when a crisis is developing for me:     I am having increasing suicidal thoughts that turn to plans with intent or means  I am having additional urges to self-harm    My emotions are of hopelessness; feeling like there's no way out.  Rage or anger.  Engaging in risky activities without thinking  Withdrawing from family/friends  Dramatic mood swings  Drastic personality changes   Use of alcohol or drugs  Postings on social media  Neglect of personal hygiene or cares      Things I am able to do on my own to cope or help me feel better:    Other things to Try:  Spending quality time with loved ones  Staying hydrated  Eating balanced meals  Going for a walk every day  Take care of daily responsibilities/needs  Focus on positive self-talk vs negative self-talk     Things that I am able to do with others to cope or help me better:   Other things to Try:  Exercise  Music  Deep breathing  Meditations  Journal  Self-regulate  Self check-in  Ask for help     Things I can use or do for distraction:   Other things to Try:  Reach out to/spend time with family, friends  Shower  Exercise  Chores or do a project  Listen to music  Watch movie/TV  Listening to music  Journaling  Reading a book  Meditating  Call a friend     Changes I can make to support my mental health and  "wellness:    -I will abstain from all mood altering chemicals not currently prescribed to me   -I will attend scheduled mental health therapy and psychiatric appointments and follow all   recommendations  -I will commit to 30 minutes of self care daily - this can be as simple as taking a shower, going for a   walk, cooking a meal, read, writing, etc  -I will practice square breathing when I begin to feel anxious - in breath through the nose for the count   of 4 and the first line on the square. Out breath through the mouth for the count of 4 for the second line   of the square. Repeat to complete the square. Repeat the square as many times as needed.  - I will use distraction skills of: going for walks, watching TV, spending time outside, calling a friend or   family member  -Use community resources, including hotline numbers, [facility] crisis and support meetings  -Maintain a daily schedule/routine  -Practice deep breathing skills  -Download a meditation charles and spend 15-20 minutes per day mediating/relaxing. Some apps to   download include: Calm, Headspace and Insight Timer. All 3 of these apps have free version     People in my life that I can ask for help:   Family  Friends      Your [facility] has a mental health crisis team you can call 24/7: Call 988 for the Suicide & Crisis Lifeline.      Crisis Lines  Crisis Text Line  Text 713248  You will be connected with a trained live crisis counselor to provide support.    Por sidanol, texto  TR a 516331 o texto a 442-AYUDAME en WhatsApp    The [employer]hele Project (LGBTQ Youth Crisis Line)  8.079.210.4115  text START to 395-745      Community Resources  Fast Tracker  Linking people to mental health and substance use disorder resources  fasttrackermn.org     [place] Mental Health Warm Line  Peer to peer support  Monday thru Saturday, 12 pm to 10 pm  879.950.3667 or 9.990.366.3377  Text \"Support\" to 15341    National [place] on Mental Illness (ISI)  644.282.6073 " "or 1.888.ISI.HELPS      Mental Health Apps  My3  https://S[employer]/    VirtualPlunifyeBox  https://PeerTrader.org/apps/virtual-hope-box/      Crisis Lines  Crisis Text Line  Text 117446  You will be connected with a trained live crisis counselor to provide support.    Por espanol, texto  TR a 227970 o texto a 442-AYUDAME en WhatsApp    National Hope Line  1.800.SUICIDE [3416867]      Community Resources  Fast Tracker  Linking people to mental health and substance use disorder resources  Kerecisn.org     [place] Mental Health Warm Line  Peer to peer support  Monday thru Saturday, 12 pm to 10 pm  845.136.6434 or 7.853.077.7611  Text \"Support\" to 76289    National [place] on Mental Illness (ISI)  973.368.3686 or 1.888.ISI.HELPS      Mental Health Apps  My3  https://[employer]/    BountyJobseB365net  https://PeerTrader.AutoShag/apps/virtual-hope-box/      Additional Information  Today you were seen by a licensed mental health professional through Triage and Transition services, [facility] ([facility])  for a crisis assessment in the Emergency Department at [facility].  It is recommended that you follow up with your established providers (psychiatrist, mental health therapist, and/or primary care doctor - as relevant) as soon as possible. Coordinators from [facility] will be calling you in the next 24-48 hours to ensure that you have the resources you need.  You can also contact [facility] coordinators directly at [phone]. You may have been scheduled for or offered an appointment with a mental health provider. [facility] maintains an extensive network of licensed behavioral health providers to connect patients with the services they need.  We do not charge providers a fee to participate in our referral network.  We match patients with providers based on a patient's specific needs, insurance coverage, and location.  Our first effort will be to refer you to a provider within your care system, and " will utilize providers outside your care system as needed.

## 2023-05-26 NOTE — CONSULTS
Diagnostic Evaluation Consultation  Crisis Assessment    Patient was assessed: [facility]  Patient location: [facility] ED  Was a release of information signed: No. Reason: Patient has Care everywhere      Referral Data and Chief Complaint  Yasmine Smith is a 22 year old, who uses she/her pronouns, and presents to the ED with family/friends. Patient is referred to the ED by family/friends. Patient is presenting to the ED for the following concerns: Self harm, Suicidal ideation    Per ED note: Pt presents with father over depression.  Pt and her mother had an escalation, 911 was called.  Pt was released to mother. Pt was to go to work with mother and father was to pick her up to bring to the ED.   Pt does have a new therapist- [facility] in [place].  Pt was cutting her right forearm today.   Pt states that she has had multiple triggers over the last few months, depressed today and thoughts of suicidal thoughts.  Plan was to overdose.  Pt states that she cut with a screw her forearm, stated she needs a tetanus shot.       Informed Consent and Assessment Methods     Patient is reported to be under the guardianship of Josefina Smith (GUARDIAN) (Mother)  : pending validation by [facility] . Writer met with patient and spoke with guardian  and explained the crisis assessment process, including applicable information disclosures and limits to confidentiality, assessed understanding of the process, and obtained consent to proceed with the assessment. Patient was observed to be able to participate in the assessment as evidenced by Verbal consent. Assessment methods included conducting a formal interview with patient, review of medical records, collaboration with medical staff, and obtaining relevant collateral information from family and community providers when available..     Over the course of this crisis assessment provided reassurance, offered validation, engaged patient in problem solving  and disposition planning and worked with patient on safety and aftercare planning. Patient's response to interventions was Positive.      Summary of Patient Situation  Patient presents to the emergency department with dad at bedside.  Today patient reports having a difficult time with her depression and reports that she cut herself.  Upon further investigation patient has some superficial scratching to her arm.  Patient reports that this past weekend her mom put her dogs in a shelter because they are untrained and were destroying the house and urinating and defecating all over the place.  Patient feels very sad by this and is struggling to cope with the loss.  Patient states that today her mom had to go back to work after coming home and patient did not want to be alone.  Patient called the police and told the police that she felt unsafe being alone house.  Patient's mother called patient's father and requested that patient be brought to the emergency room for evaluation.  Patient appeared tearful when discussing the loss of her pets and the loss of a previous friend who had passed away.  Patient struggles with being alone, now that patient's pets are gone she ends up being alone in the home more when mom goes to work.  Patient reported to mom and dad that she was feeling like she wanted to die.  Patient denied feeling suicidal to this  and contributes her depressed mood to being alone and feeling sad about her pets.  Patient has no plan or history of suicide attempts      Brief Psychosocial History   Patient lives with mom and spends every other weekend at dad's house.  Patient is not working or attending any programming.  Mom feels that some programming would be beneficial for patient but due to their location there is no programs patient is currently able to get into.         Significant Clinical History  Patient reports that she has been hospitalized many times.  She sees a therapist, primary care  provider and mental health medication management.  Patient also has a DD worker through the [facility] but mom feels that they have not been able to get support for patient due to COVID restrictions and shutting down several programs.  Patient has a history of self-harm via superficial scratching.  Patient has no history of suicide attempts but often feels sad or that she is disappointing her family which makes her feel that she should not be alive.  Patient has a history of auditory and visual hallucinations but currently denies any issues related to that.  Patient and parents deny any issues with substance abuse.  Patient is not currently engaged in any programmatic care.         Collateral Information  Patient spoke with patient with father at bedside who verbalized that he received a call from patient's mother stating that he needed to pick her up and bring her to the hospital for inpatient admission.  He requested that  call patient's mother who is legal guardian.      [facility] called Patients mother Josefina Smith [phone].  Patient's mother states that patient called 911 on herself and informed patient's father that she was suicidal and wanted to die.  Patient's mom called patient's father and requested that she be brought to the emergency department.  Patient's mother reports that patient has been suffering from depression and frequently states or makes comments regarding not wanting to be alive.  Patient's mother is unsure of which comments to take seriously.   explained to mom at length her interpretation of patient's suicidal threats and superficial marks.   and mom discussed patient increasing her services through her DD worker and getting a second opinion on her medication.       Risk Assessment  Sebastian Suicide Severity Rating Scale Full Clinical Version:5.25.23  Suicidal Ideation  1. Wish to be Dead (Lifetime): Yes  1. Wish to be Dead (Past 1 Month): Yes  2. Non-Specific Active  Suicidal Thoughts (Lifetime): No  Intensity of Ideation  Most Severe Ideation Rating (Lifetime): 2  Most Severe Ideation Rating (Past 1 Month): 3  Frequency (Lifetime): Once a week  Frequency (Past 1 Month): Once a week  Duration (Lifetime): Less than 1 hour/some of the time  Duration (Past 1 Month): Fleeting, few seconds or minutes  Controllability (Lifetime): Easily able to control thoughts  Controllability (Past 1 Month): Easily able to control thoughts  Deterrents (Lifetime): Deterrents definitely stopped you from attempting suicide  Deterrents (Past 1 Month): Deterrents definitely stopped you from attempting suicide  Reasons for Ideation (Lifetime): Completely to get attention, revenge, or a reaction from others  Reasons for Ideation (Past 1 Month): Completely to get attention, revenge, or a reaction from others  Suicidal Behavior  Actual Attempt (Lifetime): No  Has subject engaged in non-suicidal self-injurious behavior? (Lifetime): Yes  Has subject engaged in non-suicidal self-injurious behavior? (Past 3 Months): Yes  Interrupted Attempts (Lifetime): No  Aborted or Self-Interrupted Attempt (Lifetime): No  Preparatory Acts or Behavior (Lifetime): No  C-SSRS Risk (Lifetime/Recent)  Calculated C-SSRS Risk Score (Lifetime/Recent): Low Risk    Shackelford Suicide Severity Rating Scale Since Last Contact: NA     Validity of evaluation is impacted by presenting factors during interview Patient has a guardian.   Comments regarding subjective versus objective responses to Shackelford tool: Patient appears genuine in responses.   Environmental or Psychosocial Events: loss of a loved one, other life stressors and recent life events: got rid of dogs  Chronic Risk Factors: history of psychiatric hospitalization and serious, persistent mental illness   Warning Signs: seeking access to means to hurt or kill self, talking or writing about death, dying, or suicide, hopelessness, dramatic changes in mood, no reason for living, no  sense of purpose in life and engaging in self-destructive behavior  Protective Factors: strong bond to family unit, community support, or employment, intact marriage or domestic partnership, able to access care without barriers, supportive ongoing medical and mental health care relationships and help seeking  Interpretation of Risk Scoring, Risk Mitigation Interventions and Safety Plan:  Patient reports having Ideation but no intent. She is future focused.      Does the patient have thoughts of harming others? No     Is the patient engaging in sexually inappropriate behavior?  no        Current Substance Abuse     Is there recent substance abuse? no     Was a urine drug screen or blood alcohol level obtained: No       Mental Status Exam     Affect: Appropriate   Appearance: Appropriate    Attention Span/Concentration: Attentive  Eye Contact: Engaged   Fund of Knowledge: Appropriate    Language /Speech Content: Fluent   Language /Speech Volume: Normal    Language /Speech Rate/Productions: Normal    Recent Memory: Intact   Remote Memory: Intact   Mood: Anxious, Irritable and Sad    Orientation to Person: Yes    Orientation to Place: Yes   Orientation to Time of Day: Yes    Orientation to Date: Yes    Situation (Do they understand why they are here?): Yes    Psychomotor Behavior: Normal    Thought Content: Clear   Thought Form: Intact      History of commitment: No    Medication    Psychotropic medications:   No current facility-administered medications for this encounter.     Current Outpatient Medications   Medication     acetaminophen (TYLENOL) 325 MG tablet     albuterol (VENTOLIN HFA) 108 (90 Base) MCG/ACT inhaler     cetirizine (ZYRTEC) 10 MG tablet     clonazePAM (KLONOPIN) 1 MG tablet     divalproex sodium extended-release (DEPAKOTE ER) 500 MG 24 hr tablet     etonogestrel (NEXPLANON) 68 MG IMPL     gabapentin (NEURONTIN) 600 MG tablet     hydrOXYzine (VISTARIL) 50 MG capsule     lamoTRIgine (LAMICTAL) 200 MG  tablet     levothyroxine (SYNTHROID/LEVOTHROID) 50 MCG tablet     omeprazole (PRILOSEC) 40 MG DR capsule     ondansetron (ZOFRAN) 8 MG tablet     polyethylene glycol (MIRALAX) 17 g packet     prazosin (MINIPRESS) 2 MG capsule     senna (SENOKOT) 8.6 MG tablet     sertraline (ZOLOFT) 100 MG tablet     SUMAtriptan (IMITREX) 50 MG tablet     topiramate (TOPAMAX) 50 MG tablet     traZODone (DESYREL) 50 MG tablet     VITAMIN D3 SUPER STRENGTH 50 MCG (2000 UT) CAPS     vortioxetine (TRINTELLIX) 5 MG tablet     ziprasidone (GEODON) 80 MG capsule       Medication changes made in the last two weeks: No       Current Care Team    Primary Care Provider: Dr. JEANMARIE payan.   Psychiatrist: Joselyn payan   Therapist: Alexia cosme [facility]   : No   OT: Peyton payan clinic: [facility]ODRIG[facility] or [facility]: No  ACT Team: No  Other: No      Diagnosis    Schizoaffective disorder, Bipolar type - (F25.0)- Hx  Autism spectrum disorder - (F84.0)-hx    Clinical Summary and Substantiation of Recommendations    Patient presents to the emergency department after making comments regarding suicide or desire to end her life.  Upon further investigation patient reports that she was feeling suicidal and not wanting to live because her mom was going back to work and patient would be left alone with no support at the home.  Patient is feeling extra lonely because her animals were given to the shelter because they were destructive and not being cared for properly.  Patient verbalized an ability to remain safe in the home.  She verbalized future oriented statements and is excited about an upcoming wedding this weekend.  Patient has no history of suicide attempts, plans or intent.  Patient has history of self-harm via superficial scratching.  Patient notes that she is terrified of blood and has never drawn blood from self harming.  Patient has a therapy appointment tomorrow so she will be able to process the loss of  her pets.   spent a significant amount of time discussing possible group home/programmatic care options with mom.  Mom is unsure of what is available in the area and will reach out to patient's DD worker tomorrow in an attempt to get patient more services.  Mom voiced that she does not feel patient is on the correct medication and that there is only one provider in the area.   recommended getting a second opinion on patient's medications, mom open to this idea.   set patient up with a new appointment for psychiatry to get a second opinion on patient's medications.  Patient is safe to discharge and will go home with dad.  Disposition    Recommended disposition: Individual Therapy and Medication Management       Reviewed case and recommendations with attending provider. Attending Name: Dr. Kelley        Attending concurs with disposition: Yes       Patient and/or validated legal guardian concurs with disposition: Yes       Final disposition: Individual therapy  and Medication management.       Outpatient Details (if applicable):   Aftercare plan and appointments placed in the AVS and provided to patient: Yes. Given to patient by Bedside RN    Was lethal means counseling provided as a part of aftercare planning? Yes - describe spoke to mom about managing Patients medications and locking all medication.       Assessment Details    Patient interview started at: 9 PMand completed at: 10 15 PM .     Total duration spent on the patient case in minutes: 1.25 hrs      CPT code(s) utilized: 48713 - Psychotherapy for Crisis - 60 (30-74*) min and 29612 - Psychotherapy for Crisis (Each additional 30 minutes) - 30 min          Tierney Shields, IRMA, LICSW, [facility]  DEC - Triage & Transition Services  Callback: [phone]

## 2023-05-26 NOTE — ED NOTES
Pt discharged by Munira Butterfield RN as writer was busy with another pt. She had refused VS prior stating she just wants to get home.